# Patient Record
Sex: MALE | Race: WHITE | NOT HISPANIC OR LATINO | URBAN - METROPOLITAN AREA
[De-identification: names, ages, dates, MRNs, and addresses within clinical notes are randomized per-mention and may not be internally consistent; named-entity substitution may affect disease eponyms.]

---

## 2020-03-24 ENCOUNTER — HOSPITAL ENCOUNTER (EMERGENCY)
Facility: HOSPITAL | Age: 35
Discharge: HOME | End: 2020-03-24
Attending: EMERGENCY MEDICINE

## 2020-03-24 VITALS
BODY MASS INDEX: 22.78 KG/M2 | TEMPERATURE: 98 F | SYSTOLIC BLOOD PRESSURE: 142 MMHG | DIASTOLIC BLOOD PRESSURE: 99 MMHG | OXYGEN SATURATION: 97 % | HEIGHT: 73 IN | RESPIRATION RATE: 20 BRPM | HEART RATE: 100 BPM | WEIGHT: 171.88 LBS

## 2020-03-24 DIAGNOSIS — S02.40FA CLOSED FRACTURE OF LEFT ZYGOMATIC ARCH, INITIAL ENCOUNTER: ICD-10-CM

## 2020-03-24 DIAGNOSIS — S02.40DA CLOSED FRACTURE OF LEFT SIDE OF MAXILLA, INITIAL ENCOUNTER: Primary | ICD-10-CM

## 2020-03-24 PROCEDURE — 99284 EMERGENCY DEPT VISIT MOD MDM: CPT | Mod: 25

## 2020-03-24 RX ORDER — HYDROCODONE BITARTRATE AND ACETAMINOPHEN 10; 325 MG/1; MG/1
1 TABLET ORAL
Qty: 11 TABLET | Refills: 0 | Status: SHIPPED | OUTPATIENT
Start: 2020-03-24 | End: 2020-03-29
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
## 2024-06-30 ENCOUNTER — HOSPITAL ENCOUNTER (EMERGENCY)
Facility: HOSPITAL | Age: 39
Discharge: HOME | End: 2024-06-30
Attending: EMERGENCY MEDICINE
Payer: MEDICAID

## 2024-06-30 VITALS
WEIGHT: 152.81 LBS | SYSTOLIC BLOOD PRESSURE: 141 MMHG | TEMPERATURE: 98 F | BODY MASS INDEX: 20.25 KG/M2 | RESPIRATION RATE: 16 BRPM | HEIGHT: 73 IN | OXYGEN SATURATION: 100 % | DIASTOLIC BLOOD PRESSURE: 75 MMHG | HEART RATE: 77 BPM

## 2024-06-30 DIAGNOSIS — T14.90XA TRAUMA: ICD-10-CM

## 2024-06-30 DIAGNOSIS — S50.11XA CONTUSION OF RIGHT FOREARM, INITIAL ENCOUNTER: Primary | ICD-10-CM

## 2024-06-30 PROCEDURE — 99283 EMERGENCY DEPT VISIT LOW MDM: CPT | Mod: 25

## 2024-06-30 PROCEDURE — 25000003 PHARM REV CODE 250: Performed by: EMERGENCY MEDICINE

## 2024-06-30 RX ORDER — KETOROLAC TROMETHAMINE 10 MG/1
10 TABLET, FILM COATED ORAL
Status: COMPLETED | OUTPATIENT
Start: 2024-06-30 | End: 2024-06-30

## 2024-06-30 RX ADMIN — KETOROLAC TROMETHAMINE 10 MG: 10 TABLET, FILM COATED ORAL at 03:06

## 2024-06-30 NOTE — ED NOTES
Assisted patient in triage work-up. Placed patient on monitor at this time. RN and medic at bedside.
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
## 2024-07-06 ENCOUNTER — TELEPHONE (OUTPATIENT)
Dept: EMERGENCY MEDICINE | Facility: HOSPITAL | Age: 39
End: 2024-07-06
Payer: MEDICAID
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
## 2024-09-25 ENCOUNTER — ANESTHESIA (OUTPATIENT)
Dept: SURGERY | Facility: HOSPITAL | Age: 39
End: 2024-09-25
Payer: MEDICAID

## 2024-09-25 ENCOUNTER — ANESTHESIA EVENT (OUTPATIENT)
Dept: SURGERY | Facility: HOSPITAL | Age: 39
End: 2024-09-25
Payer: MEDICAID

## 2024-09-25 ENCOUNTER — HOSPITAL ENCOUNTER (INPATIENT)
Facility: HOSPITAL | Age: 39
LOS: 8 days | Discharge: NURSING, REHAB OR LONG-TERM CARE | DRG: 854 | End: 2024-10-03
Attending: EMERGENCY MEDICINE | Admitting: INTERNAL MEDICINE
Payer: MEDICAID

## 2024-09-25 DIAGNOSIS — A41.9 SEPSIS, DUE TO UNSPECIFIED ORGANISM, UNSPECIFIED WHETHER ACUTE ORGAN DYSFUNCTION PRESENT: Primary | ICD-10-CM

## 2024-09-25 DIAGNOSIS — S52.202G: ICD-10-CM

## 2024-09-25 DIAGNOSIS — R07.9 CHEST PAIN: ICD-10-CM

## 2024-09-25 DIAGNOSIS — M25.462 EFFUSION, LEFT KNEE: ICD-10-CM

## 2024-09-25 DIAGNOSIS — L02.414 ABSCESS OF LEFT ARM: ICD-10-CM

## 2024-09-25 DIAGNOSIS — M00.9 PYOGENIC ARTHRITIS OF LEFT KNEE JOINT, DUE TO UNSPECIFIED ORGANISM: ICD-10-CM

## 2024-09-25 DIAGNOSIS — F19.90 IV DRUG USER: ICD-10-CM

## 2024-09-25 DIAGNOSIS — M00.9 SEPTIC ARTHRITIS OF KNEE, LEFT: ICD-10-CM

## 2024-09-25 DIAGNOSIS — M25.462 EFFUSION OF LEFT KNEE JOINT: ICD-10-CM

## 2024-09-25 DIAGNOSIS — B19.20 HEPATITIS C TEST POSITIVE: ICD-10-CM

## 2024-09-25 DIAGNOSIS — R53.1 WEAKNESS: ICD-10-CM

## 2024-09-25 DIAGNOSIS — B95.61 STAPHYLOCOCCUS AUREUS BACTEREMIA: ICD-10-CM

## 2024-09-25 DIAGNOSIS — L03.114 CELLULITIS OF LEFT ARM: ICD-10-CM

## 2024-09-25 DIAGNOSIS — M00.862 ARTHRITIS OF LEFT KNEE DUE TO OTHER BACTERIA: ICD-10-CM

## 2024-09-25 DIAGNOSIS — R78.81 STAPHYLOCOCCUS AUREUS BACTEREMIA: ICD-10-CM

## 2024-09-25 DIAGNOSIS — M79.89 LEFT LEG SWELLING: ICD-10-CM

## 2024-09-25 DIAGNOSIS — Z72.0 TOBACCO ABUSE DISORDER: ICD-10-CM

## 2024-09-25 PROBLEM — E87.1 HYPONATREMIA: Status: ACTIVE | Noted: 2024-09-25

## 2024-09-25 PROBLEM — F19.10 IV DRUG ABUSE: Chronic | Status: ACTIVE | Noted: 2024-09-25

## 2024-09-25 PROBLEM — R76.8 HEPATITIS C ANTIBODY TEST POSITIVE: Status: ACTIVE | Noted: 2024-09-25

## 2024-09-25 LAB
ALBUMIN SERPL BCP-MCNC: 2.3 G/DL (ref 3.5–5.2)
ALP SERPL-CCNC: 101 U/L (ref 55–135)
ALT SERPL W/O P-5'-P-CCNC: 15 U/L (ref 10–44)
AMPHET+METHAMPHET UR QL: ABNORMAL
ANION GAP SERPL CALC-SCNC: 9 MMOL/L (ref 8–16)
APPEARANCE FLD: NORMAL
APTT PPP: 37.4 SEC (ref 21–32)
AST SERPL-CCNC: 22 U/L (ref 10–40)
BARBITURATES UR QL SCN>200 NG/ML: NEGATIVE
BASOPHILS # BLD AUTO: 0.06 K/UL (ref 0–0.2)
BASOPHILS NFR BLD: 0.4 % (ref 0–1.9)
BASOPHILS NFR FLD MANUAL: 1 %
BENZODIAZ UR QL SCN>200 NG/ML: NEGATIVE
BILIRUB SERPL-MCNC: 1.1 MG/DL (ref 0.1–1)
BILIRUB UR QL STRIP: NEGATIVE
BNP SERPL-MCNC: 17 PG/ML (ref 0–99)
BODY FLD TYPE: NORMAL
BODY FLD TYPE: NORMAL
BUN SERPL-MCNC: 9 MG/DL (ref 6–20)
BZE UR QL SCN: NEGATIVE
CALCIUM SERPL-MCNC: 8.5 MG/DL (ref 8.7–10.5)
CANNABINOIDS UR QL SCN: NEGATIVE
CHLORIDE SERPL-SCNC: 96 MMOL/L (ref 95–110)
CK SERPL-CCNC: 50 U/L (ref 20–200)
CLARITY UR: CLEAR
CO2 SERPL-SCNC: 24 MMOL/L (ref 23–29)
COLOR FLD: YELLOW
COLOR UR: YELLOW
CREAT SERPL-MCNC: 0.7 MG/DL (ref 0.5–1.4)
CREAT UR-MCNC: 77.6 MG/DL (ref 23–375)
CRP SERPL-MCNC: 236.9 MG/L (ref 0–8.2)
CRYSTALS FLD MICRO: NEGATIVE
DIFFERENTIAL METHOD BLD: ABNORMAL
EOSINOPHIL # BLD AUTO: 0 K/UL (ref 0–0.5)
EOSINOPHIL NFR BLD: 0.1 % (ref 0–8)
ERYTHROCYTE [DISTWIDTH] IN BLOOD BY AUTOMATED COUNT: 13.4 % (ref 11.5–14.5)
ERYTHROCYTE [SEDIMENTATION RATE] IN BLOOD BY WESTERGREN METHOD: 67 MM/HR (ref 0–10)
EST. GFR  (NO RACE VARIABLE): >60 ML/MIN/1.73 M^2
GLUCOSE SERPL-MCNC: 112 MG/DL (ref 70–110)
GLUCOSE UR QL STRIP: NEGATIVE
GRAM STN SPEC: NORMAL
HCT VFR BLD AUTO: 28.8 % (ref 40–54)
HCV AB SERPL QL IA: POSITIVE
HEP C VIRUS HOLD SPECIMEN: NORMAL
HGB BLD-MCNC: 9.6 G/DL (ref 14–18)
HGB UR QL STRIP: ABNORMAL
HIV 1+2 AB+HIV1 P24 AG SERPL QL IA: NEGATIVE
IMM GRANULOCYTES # BLD AUTO: 0.11 K/UL (ref 0–0.04)
IMM GRANULOCYTES NFR BLD AUTO: 0.7 % (ref 0–0.5)
INFLUENZA A, MOLECULAR: NEGATIVE
INFLUENZA B, MOLECULAR: NEGATIVE
INR PPP: 1 (ref 0.8–1.2)
KETONES UR QL STRIP: NEGATIVE
LACTATE SERPL-SCNC: 0.8 MMOL/L (ref 0.5–2.2)
LACTATE SERPL-SCNC: 0.9 MMOL/L (ref 0.5–2.2)
LEUKOCYTE ESTERASE UR QL STRIP: NEGATIVE
LYMPHOCYTES # BLD AUTO: 1.2 K/UL (ref 1–4.8)
LYMPHOCYTES NFR BLD: 7.6 % (ref 18–48)
LYMPHOCYTES NFR FLD MANUAL: 3 %
MAGNESIUM SERPL-MCNC: 2 MG/DL (ref 1.6–2.6)
MAGNESIUM SERPL-MCNC: 2 MG/DL (ref 1.6–2.6)
MCH RBC QN AUTO: 27.9 PG (ref 27–31)
MCHC RBC AUTO-ENTMCNC: 33.3 G/DL (ref 32–36)
MCV RBC AUTO: 84 FL (ref 82–98)
METHADONE UR QL SCN>300 NG/ML: NEGATIVE
MICROSCOPIC COMMENT: ABNORMAL
MONOCYTES # BLD AUTO: 1 K/UL (ref 0.3–1)
MONOCYTES NFR BLD: 6.5 % (ref 4–15)
MONOS+MACROS NFR FLD MANUAL: 7 %
NEUTROPHILS # BLD AUTO: 13.2 K/UL (ref 1.8–7.7)
NEUTROPHILS NFR BLD: 84.7 % (ref 38–73)
NEUTROPHILS NFR FLD MANUAL: 89 %
NITRITE UR QL STRIP: NEGATIVE
NRBC BLD-RTO: 0 /100 WBC
OHS QRS DURATION: 84 MS
OHS QTC CALCULATION: 531 MS
OPIATES UR QL SCN: NEGATIVE
PCP UR QL SCN>25 NG/ML: NEGATIVE
PH UR STRIP: 6 [PH] (ref 5–8)
PHOSPHATE SERPL-MCNC: 2 MG/DL (ref 2.7–4.5)
PLATELET # BLD AUTO: 298 K/UL (ref 150–450)
PMV BLD AUTO: 11.1 FL (ref 9.2–12.9)
POTASSIUM SERPL-SCNC: 3.4 MMOL/L (ref 3.5–5.1)
PROCALCITONIN SERPL IA-MCNC: 0.84 NG/ML
PROT SERPL-MCNC: 6.9 G/DL (ref 6–8.4)
PROT UR QL STRIP: ABNORMAL
PROTHROMBIN TIME: 11.6 SEC (ref 9–12.5)
RBC # BLD AUTO: 3.44 M/UL (ref 4.6–6.2)
RBC #/AREA URNS HPF: 5 /HPF (ref 0–4)
SARS-COV-2 RDRP RESP QL NAA+PROBE: NEGATIVE
SODIUM SERPL-SCNC: 129 MMOL/L (ref 136–145)
SP GR UR STRIP: 1.01 (ref 1–1.03)
SPECIMEN SOURCE: NORMAL
TOXICOLOGY INFORMATION: ABNORMAL
TROPONIN I SERPL DL<=0.01 NG/ML-MCNC: <0.006 NG/ML (ref 0–0.03)
URN SPEC COLLECT METH UR: ABNORMAL
UROBILINOGEN UR STRIP-ACNC: NEGATIVE EU/DL
WBC # BLD AUTO: 15.58 K/UL (ref 3.9–12.7)
WBC # FLD: NORMAL /CU MM
WBC #/AREA URNS HPF: 5 /HPF (ref 0–5)

## 2024-09-25 PROCEDURE — 96361 HYDRATE IV INFUSION ADD-ON: CPT

## 2024-09-25 PROCEDURE — 86803 HEPATITIS C AB TEST: CPT | Performed by: EMERGENCY MEDICINE

## 2024-09-25 PROCEDURE — 63600175 PHARM REV CODE 636 W HCPCS: Performed by: NURSE ANESTHETIST, CERTIFIED REGISTERED

## 2024-09-25 PROCEDURE — 87150 DNA/RNA AMPLIFIED PROBE: CPT | Performed by: EMERGENCY MEDICINE

## 2024-09-25 PROCEDURE — 80053 COMPREHEN METABOLIC PANEL: CPT | Performed by: EMERGENCY MEDICINE

## 2024-09-25 PROCEDURE — 36415 COLL VENOUS BLD VENIPUNCTURE: CPT | Performed by: EMERGENCY MEDICINE

## 2024-09-25 PROCEDURE — 87502 INFLUENZA DNA AMP PROBE: CPT | Performed by: EMERGENCY MEDICINE

## 2024-09-25 PROCEDURE — 63600175 PHARM REV CODE 636 W HCPCS: Performed by: EMERGENCY MEDICINE

## 2024-09-25 PROCEDURE — 37000009 HC ANESTHESIA EA ADD 15 MINS: Performed by: ORTHOPAEDIC SURGERY

## 2024-09-25 PROCEDURE — 83605 ASSAY OF LACTIC ACID: CPT | Performed by: EMERGENCY MEDICINE

## 2024-09-25 PROCEDURE — 63600175 PHARM REV CODE 636 W HCPCS

## 2024-09-25 PROCEDURE — 25000003 PHARM REV CODE 250: Performed by: NURSE ANESTHETIST, CERTIFIED REGISTERED

## 2024-09-25 PROCEDURE — 87206 SMEAR FLUORESCENT/ACID STAI: CPT | Mod: 91 | Performed by: ORTHOPAEDIC SURGERY

## 2024-09-25 PROCEDURE — 25000003 PHARM REV CODE 250: Performed by: EMERGENCY MEDICINE

## 2024-09-25 PROCEDURE — 93010 ELECTROCARDIOGRAM REPORT: CPT | Mod: ,,, | Performed by: INTERNAL MEDICINE

## 2024-09-25 PROCEDURE — 87206 SMEAR FLUORESCENT/ACID STAI: CPT | Mod: 91 | Performed by: EMERGENCY MEDICINE

## 2024-09-25 PROCEDURE — 83735 ASSAY OF MAGNESIUM: CPT | Performed by: EMERGENCY MEDICINE

## 2024-09-25 PROCEDURE — 89060 EXAM SYNOVIAL FLUID CRYSTALS: CPT | Performed by: EMERGENCY MEDICINE

## 2024-09-25 PROCEDURE — 84145 PROCALCITONIN (PCT): CPT | Performed by: EMERGENCY MEDICINE

## 2024-09-25 PROCEDURE — 99284 EMERGENCY DEPT VISIT MOD MDM: CPT | Mod: 57,25,, | Performed by: ORTHOPAEDIC SURGERY

## 2024-09-25 PROCEDURE — 88304 TISSUE EXAM BY PATHOLOGIST: CPT | Mod: 26,,, | Performed by: PATHOLOGY

## 2024-09-25 PROCEDURE — 87070 CULTURE OTHR SPECIMN AEROBIC: CPT | Mod: 59 | Performed by: ORTHOPAEDIC SURGERY

## 2024-09-25 PROCEDURE — 89051 BODY FLUID CELL COUNT: CPT | Performed by: EMERGENCY MEDICINE

## 2024-09-25 PROCEDURE — 85730 THROMBOPLASTIN TIME PARTIAL: CPT | Performed by: EMERGENCY MEDICINE

## 2024-09-25 PROCEDURE — 29877 ARTHRS KNEE SURG DBRDMT/SHVG: CPT | Mod: LT,,, | Performed by: ORTHOPAEDIC SURGERY

## 2024-09-25 PROCEDURE — 87077 CULTURE AEROBIC IDENTIFY: CPT | Mod: 59 | Performed by: ORTHOPAEDIC SURGERY

## 2024-09-25 PROCEDURE — 87116 MYCOBACTERIA CULTURE: CPT | Performed by: EMERGENCY MEDICINE

## 2024-09-25 PROCEDURE — 25000003 PHARM REV CODE 250: Performed by: INTERNAL MEDICINE

## 2024-09-25 PROCEDURE — 87186 SC STD MICRODIL/AGAR DIL: CPT | Mod: 59 | Performed by: ORTHOPAEDIC SURGERY

## 2024-09-25 PROCEDURE — 71000039 HC RECOVERY, EACH ADD'L HOUR: Performed by: ORTHOPAEDIC SURGERY

## 2024-09-25 PROCEDURE — 87205 SMEAR GRAM STAIN: CPT | Mod: 59 | Performed by: ORTHOPAEDIC SURGERY

## 2024-09-25 PROCEDURE — 0XBF0ZZ EXCISION OF LEFT LOWER ARM, OPEN APPROACH: ICD-10-PCS | Performed by: ORTHOPAEDIC SURGERY

## 2024-09-25 PROCEDURE — 88304 TISSUE EXAM BY PATHOLOGIST: CPT | Performed by: PATHOLOGY

## 2024-09-25 PROCEDURE — 88305 TISSUE EXAM BY PATHOLOGIST: CPT | Performed by: PATHOLOGY

## 2024-09-25 PROCEDURE — 36000711: Performed by: ORTHOPAEDIC SURGERY

## 2024-09-25 PROCEDURE — 63600175 PHARM REV CODE 636 W HCPCS: Performed by: STUDENT IN AN ORGANIZED HEALTH CARE EDUCATION/TRAINING PROGRAM

## 2024-09-25 PROCEDURE — 25000003 PHARM REV CODE 250: Performed by: ORTHOPAEDIC SURGERY

## 2024-09-25 PROCEDURE — 63600175 PHARM REV CODE 636 W HCPCS: Performed by: ORTHOPAEDIC SURGERY

## 2024-09-25 PROCEDURE — 86140 C-REACTIVE PROTEIN: CPT | Performed by: EMERGENCY MEDICINE

## 2024-09-25 PROCEDURE — 36000710: Performed by: ORTHOPAEDIC SURGERY

## 2024-09-25 PROCEDURE — 25500020 PHARM REV CODE 255: Performed by: EMERGENCY MEDICINE

## 2024-09-25 PROCEDURE — 87205 SMEAR GRAM STAIN: CPT | Performed by: EMERGENCY MEDICINE

## 2024-09-25 PROCEDURE — 87102 FUNGUS ISOLATION CULTURE: CPT | Mod: 59 | Performed by: ORTHOPAEDIC SURGERY

## 2024-09-25 PROCEDURE — 87102 FUNGUS ISOLATION CULTURE: CPT | Mod: 59 | Performed by: EMERGENCY MEDICINE

## 2024-09-25 PROCEDURE — 83880 ASSAY OF NATRIURETIC PEPTIDE: CPT | Performed by: EMERGENCY MEDICINE

## 2024-09-25 PROCEDURE — 25000003 PHARM REV CODE 250

## 2024-09-25 PROCEDURE — 37000008 HC ANESTHESIA 1ST 15 MINUTES: Performed by: ORTHOPAEDIC SURGERY

## 2024-09-25 PROCEDURE — 21400001 HC TELEMETRY ROOM

## 2024-09-25 PROCEDURE — 0SBD4ZZ EXCISION OF LEFT KNEE JOINT, PERCUTANEOUS ENDOSCOPIC APPROACH: ICD-10-PCS | Performed by: ORTHOPAEDIC SURGERY

## 2024-09-25 PROCEDURE — 63600175 PHARM REV CODE 636 W HCPCS: Performed by: INTERNAL MEDICINE

## 2024-09-25 PROCEDURE — 87389 HIV-1 AG W/HIV-1&-2 AB AG IA: CPT | Performed by: EMERGENCY MEDICINE

## 2024-09-25 PROCEDURE — 80307 DRUG TEST PRSMV CHEM ANLYZR: CPT | Performed by: EMERGENCY MEDICINE

## 2024-09-25 PROCEDURE — 85610 PROTHROMBIN TIME: CPT | Performed by: EMERGENCY MEDICINE

## 2024-09-25 PROCEDURE — 99285 EMERGENCY DEPT VISIT HI MDM: CPT | Mod: 25

## 2024-09-25 PROCEDURE — 87070 CULTURE OTHR SPECIMN AEROBIC: CPT | Mod: 59 | Performed by: EMERGENCY MEDICINE

## 2024-09-25 PROCEDURE — 85025 COMPLETE CBC W/AUTO DIFF WBC: CPT | Performed by: EMERGENCY MEDICINE

## 2024-09-25 PROCEDURE — 87116 MYCOBACTERIA CULTURE: CPT | Mod: 59 | Performed by: ORTHOPAEDIC SURGERY

## 2024-09-25 PROCEDURE — 71000033 HC RECOVERY, INTIAL HOUR: Performed by: ORTHOPAEDIC SURGERY

## 2024-09-25 PROCEDURE — A9585 GADOBUTROL INJECTION: HCPCS | Performed by: EMERGENCY MEDICINE

## 2024-09-25 PROCEDURE — 87186 SC STD MICRODIL/AGAR DIL: CPT | Mod: 59 | Performed by: EMERGENCY MEDICINE

## 2024-09-25 PROCEDURE — U0002 COVID-19 LAB TEST NON-CDC: HCPCS | Performed by: EMERGENCY MEDICINE

## 2024-09-25 PROCEDURE — 82550 ASSAY OF CK (CPK): CPT | Performed by: EMERGENCY MEDICINE

## 2024-09-25 PROCEDURE — 0S9D3ZX DRAINAGE OF LEFT KNEE JOINT, PERCUTANEOUS APPROACH, DIAGNOSTIC: ICD-10-PCS | Performed by: STUDENT IN AN ORGANIZED HEALTH CARE EDUCATION/TRAINING PROGRAM

## 2024-09-25 PROCEDURE — 84100 ASSAY OF PHOSPHORUS: CPT | Performed by: EMERGENCY MEDICINE

## 2024-09-25 PROCEDURE — 96375 TX/PRO/DX INJ NEW DRUG ADDON: CPT

## 2024-09-25 PROCEDURE — 87075 CULTR BACTERIA EXCEPT BLOOD: CPT | Mod: 59 | Performed by: ORTHOPAEDIC SURGERY

## 2024-09-25 PROCEDURE — 85651 RBC SED RATE NONAUTOMATED: CPT | Performed by: INTERNAL MEDICINE

## 2024-09-25 PROCEDURE — 93005 ELECTROCARDIOGRAM TRACING: CPT

## 2024-09-25 PROCEDURE — 87176 TISSUE HOMOGENIZATION CULTR: CPT | Performed by: ORTHOPAEDIC SURGERY

## 2024-09-25 PROCEDURE — 87522 HEPATITIS C REVRS TRNSCRPJ: CPT | Performed by: EMERGENCY MEDICINE

## 2024-09-25 PROCEDURE — 81000 URINALYSIS NONAUTO W/SCOPE: CPT | Mod: 59 | Performed by: EMERGENCY MEDICINE

## 2024-09-25 PROCEDURE — 87077 CULTURE AEROBIC IDENTIFY: CPT | Mod: 59 | Performed by: EMERGENCY MEDICINE

## 2024-09-25 PROCEDURE — 11044 DBRDMT BONE 1ST 20 SQ CM/<: CPT | Mod: 59,,, | Performed by: ORTHOPAEDIC SURGERY

## 2024-09-25 PROCEDURE — 96365 THER/PROPH/DIAG IV INF INIT: CPT

## 2024-09-25 PROCEDURE — 84484 ASSAY OF TROPONIN QUANT: CPT | Performed by: EMERGENCY MEDICINE

## 2024-09-25 PROCEDURE — 87015 SPECIMEN INFECT AGNT CONCNTJ: CPT | Performed by: ORTHOPAEDIC SURGERY

## 2024-09-25 PROCEDURE — 20610 DRAIN/INJ JOINT/BURSA W/O US: CPT | Mod: LT

## 2024-09-25 PROCEDURE — 27201423 OPTIME MED/SURG SUP & DEVICES STERILE SUPPLY: Performed by: ORTHOPAEDIC SURGERY

## 2024-09-25 PROCEDURE — 87040 BLOOD CULTURE FOR BACTERIA: CPT | Performed by: EMERGENCY MEDICINE

## 2024-09-25 RX ORDER — ONDANSETRON HYDROCHLORIDE 2 MG/ML
4 INJECTION, SOLUTION INTRAVENOUS EVERY 8 HOURS PRN
Status: DISCONTINUED | OUTPATIENT
Start: 2024-09-25 | End: 2024-10-03 | Stop reason: HOSPADM

## 2024-09-25 RX ORDER — PROPOFOL 10 MG/ML
VIAL (ML) INTRAVENOUS
Status: DISCONTINUED | OUTPATIENT
Start: 2024-09-25 | End: 2024-09-25

## 2024-09-25 RX ORDER — AMOXICILLIN 250 MG
1 CAPSULE ORAL 2 TIMES DAILY
Status: DISCONTINUED | OUTPATIENT
Start: 2024-09-25 | End: 2024-10-03 | Stop reason: HOSPADM

## 2024-09-25 RX ORDER — HYDROMORPHONE HYDROCHLORIDE 1 MG/ML
0.2 INJECTION, SOLUTION INTRAMUSCULAR; INTRAVENOUS; SUBCUTANEOUS EVERY 5 MIN PRN
Status: COMPLETED | OUTPATIENT
Start: 2024-09-25 | End: 2024-09-25

## 2024-09-25 RX ORDER — MORPHINE SULFATE 4 MG/ML
2 INJECTION, SOLUTION INTRAMUSCULAR; INTRAVENOUS EVERY 4 HOURS PRN
Status: DISCONTINUED | OUTPATIENT
Start: 2024-09-25 | End: 2024-09-28

## 2024-09-25 RX ORDER — IBUPROFEN 800 MG/1
800 TABLET ORAL
Status: COMPLETED | OUTPATIENT
Start: 2024-09-25 | End: 2024-09-25

## 2024-09-25 RX ORDER — IBUPROFEN 200 MG
16 TABLET ORAL
Status: DISCONTINUED | OUTPATIENT
Start: 2024-09-25 | End: 2024-10-03 | Stop reason: HOSPADM

## 2024-09-25 RX ORDER — SODIUM CHLORIDE 9 MG/ML
INJECTION, SOLUTION INTRAVENOUS
Status: DISCONTINUED | OUTPATIENT
Start: 2024-09-25 | End: 2024-10-03 | Stop reason: HOSPADM

## 2024-09-25 RX ORDER — ROCURONIUM BROMIDE 10 MG/ML
INJECTION, SOLUTION INTRAVENOUS
Status: DISCONTINUED | OUTPATIENT
Start: 2024-09-25 | End: 2024-09-25

## 2024-09-25 RX ORDER — OXYCODONE HYDROCHLORIDE 5 MG/1
5 TABLET ORAL EVERY 6 HOURS PRN
Status: DISCONTINUED | OUTPATIENT
Start: 2024-09-25 | End: 2024-10-01

## 2024-09-25 RX ORDER — EPINEPHRINE 1 MG/ML
INJECTION, SOLUTION, CONCENTRATE INTRAVENOUS
Status: DISCONTINUED | OUTPATIENT
Start: 2024-09-25 | End: 2024-09-25 | Stop reason: HOSPADM

## 2024-09-25 RX ORDER — SODIUM CHLORIDE 0.9 % (FLUSH) 0.9 %
10 SYRINGE (ML) INJECTION EVERY 12 HOURS PRN
Status: DISCONTINUED | OUTPATIENT
Start: 2024-09-25 | End: 2024-10-03 | Stop reason: HOSPADM

## 2024-09-25 RX ORDER — PROCHLORPERAZINE EDISYLATE 5 MG/ML
5 INJECTION INTRAMUSCULAR; INTRAVENOUS EVERY 6 HOURS PRN
Status: DISCONTINUED | OUTPATIENT
Start: 2024-09-25 | End: 2024-10-03 | Stop reason: HOSPADM

## 2024-09-25 RX ORDER — OXYCODONE AND ACETAMINOPHEN 5; 325 MG/1; MG/1
1 TABLET ORAL
Status: DISCONTINUED | OUTPATIENT
Start: 2024-09-25 | End: 2024-09-25 | Stop reason: HOSPADM

## 2024-09-25 RX ORDER — PHENYLEPHRINE HYDROCHLORIDE 10 MG/ML
INJECTION INTRAVENOUS
Status: DISCONTINUED | OUTPATIENT
Start: 2024-09-25 | End: 2024-09-25

## 2024-09-25 RX ORDER — TALC
6 POWDER (GRAM) TOPICAL NIGHTLY PRN
Status: DISCONTINUED | OUTPATIENT
Start: 2024-09-25 | End: 2024-10-03 | Stop reason: HOSPADM

## 2024-09-25 RX ORDER — SODIUM CHLORIDE, SODIUM LACTATE, POTASSIUM CHLORIDE, CALCIUM CHLORIDE 600; 310; 30; 20 MG/100ML; MG/100ML; MG/100ML; MG/100ML
INJECTION, SOLUTION INTRAVENOUS CONTINUOUS
Status: DISCONTINUED | OUTPATIENT
Start: 2024-09-25 | End: 2024-09-25

## 2024-09-25 RX ORDER — LIDOCAINE HYDROCHLORIDE 20 MG/ML
INJECTION, SOLUTION EPIDURAL; INFILTRATION; INTRACAUDAL; PERINEURAL
Status: DISCONTINUED | OUTPATIENT
Start: 2024-09-25 | End: 2024-09-25

## 2024-09-25 RX ORDER — SUCCINYLCHOLINE CHLORIDE 20 MG/ML
INJECTION INTRAMUSCULAR; INTRAVENOUS
Status: DISCONTINUED | OUTPATIENT
Start: 2024-09-25 | End: 2024-09-25

## 2024-09-25 RX ORDER — IBUPROFEN 200 MG
24 TABLET ORAL
Status: DISCONTINUED | OUTPATIENT
Start: 2024-09-25 | End: 2024-10-03 | Stop reason: HOSPADM

## 2024-09-25 RX ORDER — NALOXONE HCL 0.4 MG/ML
0.02 VIAL (ML) INJECTION
Status: DISCONTINUED | OUTPATIENT
Start: 2024-09-25 | End: 2024-10-03 | Stop reason: HOSPADM

## 2024-09-25 RX ORDER — MIDAZOLAM HYDROCHLORIDE 1 MG/ML
INJECTION INTRAMUSCULAR; INTRAVENOUS
Status: DISCONTINUED | OUTPATIENT
Start: 2024-09-25 | End: 2024-09-25

## 2024-09-25 RX ORDER — ONDANSETRON HYDROCHLORIDE 2 MG/ML
4 INJECTION, SOLUTION INTRAVENOUS DAILY PRN
Status: DISCONTINUED | OUTPATIENT
Start: 2024-09-25 | End: 2024-09-25 | Stop reason: HOSPADM

## 2024-09-25 RX ORDER — SODIUM CHLORIDE 9 MG/ML
INJECTION, SOLUTION INTRAVENOUS CONTINUOUS
Status: DISCONTINUED | OUTPATIENT
Start: 2024-09-25 | End: 2024-09-26

## 2024-09-25 RX ORDER — DIPHENHYDRAMINE HYDROCHLORIDE 50 MG/ML
12.5 INJECTION INTRAMUSCULAR; INTRAVENOUS
Status: DISCONTINUED | OUTPATIENT
Start: 2024-09-25 | End: 2024-09-25 | Stop reason: HOSPADM

## 2024-09-25 RX ORDER — ONDANSETRON HYDROCHLORIDE 2 MG/ML
INJECTION, SOLUTION INTRAVENOUS
Status: DISCONTINUED | OUTPATIENT
Start: 2024-09-25 | End: 2024-09-25

## 2024-09-25 RX ORDER — GADOBUTROL 604.72 MG/ML
10 INJECTION INTRAVENOUS
Status: COMPLETED | OUTPATIENT
Start: 2024-09-25 | End: 2024-09-25

## 2024-09-25 RX ORDER — ACETAMINOPHEN 500 MG
1000 TABLET ORAL
Status: COMPLETED | OUTPATIENT
Start: 2024-09-25 | End: 2024-09-25

## 2024-09-25 RX ORDER — KETOROLAC TROMETHAMINE 30 MG/ML
15 INJECTION, SOLUTION INTRAMUSCULAR; INTRAVENOUS EVERY 8 HOURS PRN
Status: DISCONTINUED | OUTPATIENT
Start: 2024-09-25 | End: 2024-09-25 | Stop reason: HOSPADM

## 2024-09-25 RX ORDER — ACETAMINOPHEN 325 MG/1
650 TABLET ORAL EVERY 4 HOURS PRN
Status: DISCONTINUED | OUTPATIENT
Start: 2024-09-25 | End: 2024-10-03 | Stop reason: HOSPADM

## 2024-09-25 RX ORDER — GLUCAGON 1 MG
1 KIT INJECTION
Status: DISCONTINUED | OUTPATIENT
Start: 2024-09-25 | End: 2024-10-03 | Stop reason: HOSPADM

## 2024-09-25 RX ORDER — GLUCAGON 1 MG
1 KIT INJECTION
Status: DISCONTINUED | OUTPATIENT
Start: 2024-09-25 | End: 2024-09-25 | Stop reason: HOSPADM

## 2024-09-25 RX ORDER — FENTANYL CITRATE 50 UG/ML
INJECTION, SOLUTION INTRAMUSCULAR; INTRAVENOUS
Status: DISCONTINUED | OUTPATIENT
Start: 2024-09-25 | End: 2024-09-25

## 2024-09-25 RX ORDER — POLYETHYLENE GLYCOL 3350 17 G/17G
17 POWDER, FOR SOLUTION ORAL DAILY PRN
Status: DISCONTINUED | OUTPATIENT
Start: 2024-09-25 | End: 2024-10-03 | Stop reason: HOSPADM

## 2024-09-25 RX ADMIN — IBUPROFEN 800 MG: 800 TABLET, FILM COATED ORAL at 11:09

## 2024-09-25 RX ADMIN — MIDAZOLAM HYDROCHLORIDE 1 MG: 1 INJECTION, SOLUTION INTRAMUSCULAR; INTRAVENOUS at 05:09

## 2024-09-25 RX ADMIN — LIDOCAINE HYDROCHLORIDE 80 MG: 20 INJECTION, SOLUTION EPIDURAL; INFILTRATION; INTRACAUDAL; PERINEURAL at 04:09

## 2024-09-25 RX ADMIN — CEFEPIME 2 G: 2 INJECTION, POWDER, FOR SOLUTION INTRAVENOUS at 09:09

## 2024-09-25 RX ADMIN — SUCCINYLCHOLINE CHLORIDE 100 MG: 20 INJECTION, SOLUTION INTRAMUSCULAR; INTRAVENOUS; PARENTERAL at 04:09

## 2024-09-25 RX ADMIN — HYDROMORPHONE HYDROCHLORIDE 0.2 MG: 1 INJECTION, SOLUTION INTRAMUSCULAR; INTRAVENOUS; SUBCUTANEOUS at 07:09

## 2024-09-25 RX ADMIN — SODIUM CHLORIDE 1000 ML: 9 INJECTION, SOLUTION INTRAVENOUS at 08:09

## 2024-09-25 RX ADMIN — PHENYLEPHRINE HYDROCHLORIDE 50 MCG: 10 INJECTION INTRAVENOUS at 05:09

## 2024-09-25 RX ADMIN — ROCURONIUM BROMIDE 20 MG: 10 INJECTION, SOLUTION INTRAVENOUS at 05:09

## 2024-09-25 RX ADMIN — HYDROMORPHONE HYDROCHLORIDE 0.2 MG: 1 INJECTION, SOLUTION INTRAMUSCULAR; INTRAVENOUS; SUBCUTANEOUS at 08:09

## 2024-09-25 RX ADMIN — CEFEPIME 2 G: 2 INJECTION, POWDER, FOR SOLUTION INTRAVENOUS at 10:09

## 2024-09-25 RX ADMIN — POTASSIUM BICARBONATE 25 MEQ: 978 TABLET, EFFERVESCENT ORAL at 10:09

## 2024-09-25 RX ADMIN — SODIUM CHLORIDE: 9 INJECTION, SOLUTION INTRAVENOUS at 09:09

## 2024-09-25 RX ADMIN — SODIUM CHLORIDE, POTASSIUM CHLORIDE, SODIUM LACTATE AND CALCIUM CHLORIDE: 600; 310; 30; 20 INJECTION, SOLUTION INTRAVENOUS at 04:09

## 2024-09-25 RX ADMIN — ROCURONIUM BROMIDE 20 MG: 10 INJECTION, SOLUTION INTRAVENOUS at 04:09

## 2024-09-25 RX ADMIN — VANCOMYCIN HYDROCHLORIDE 1750 MG: 10 INJECTION, POWDER, LYOPHILIZED, FOR SOLUTION INTRAVENOUS at 01:09

## 2024-09-25 RX ADMIN — PROPOFOL 175 MG: 10 INJECTION, EMULSION INTRAVENOUS at 04:09

## 2024-09-25 RX ADMIN — OXYCODONE HYDROCHLORIDE 5 MG: 5 TABLET ORAL at 08:09

## 2024-09-25 RX ADMIN — FENTANYL CITRATE 50 MCG: 50 INJECTION, SOLUTION INTRAMUSCULAR; INTRAVENOUS at 04:09

## 2024-09-25 RX ADMIN — ONDANSETRON 4 MG: 2 INJECTION INTRAMUSCULAR; INTRAVENOUS at 06:09

## 2024-09-25 RX ADMIN — KETOROLAC TROMETHAMINE 15 MG: 30 INJECTION, SOLUTION INTRAMUSCULAR at 07:09

## 2024-09-25 RX ADMIN — ACETAMINOPHEN 1000 MG: 500 TABLET ORAL at 09:09

## 2024-09-25 RX ADMIN — SODIUM CHLORIDE 1000 ML: 9 INJECTION, SOLUTION INTRAVENOUS at 09:09

## 2024-09-25 RX ADMIN — ROCURONIUM BROMIDE 45 MG: 10 INJECTION, SOLUTION INTRAVENOUS at 04:09

## 2024-09-25 RX ADMIN — MIDAZOLAM HYDROCHLORIDE 1 MG: 1 INJECTION, SOLUTION INTRAMUSCULAR; INTRAVENOUS at 06:09

## 2024-09-25 RX ADMIN — ROCURONIUM BROMIDE 4 MG: 10 INJECTION, SOLUTION INTRAVENOUS at 04:09

## 2024-09-25 RX ADMIN — SUGAMMADEX 200 MG: 100 INJECTION, SOLUTION INTRAVENOUS at 06:09

## 2024-09-25 RX ADMIN — SODIUM CHLORIDE, POTASSIUM CHLORIDE, SODIUM LACTATE AND CALCIUM CHLORIDE: 600; 310; 30; 20 INJECTION, SOLUTION INTRAVENOUS at 02:09

## 2024-09-25 RX ADMIN — MORPHINE SULFATE 2 MG: 4 INJECTION INTRAVENOUS at 07:09

## 2024-09-25 RX ADMIN — GADOBUTROL 6 ML: 604.72 INJECTION INTRAVENOUS at 12:09
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
## 2024-09-25 NOTE — ASSESSMENT & PLAN NOTE
Prior hx of IVDA, UDS on admission + for methamphetamines   Complicating management of above conditions

## 2024-09-25 NOTE — CONSULTS
CC    Left knee and forearm pain with 102 fever       HPI     Georges Daniels is a 38 y.o. male with history of IVDA I am asked to see regarding his  left knee and forearm pain and fevers.    He reports his left knee pain and swelling over the last day or two.  It became extremely swollen in the last twenty four hours and very painful to move.     He also states he had a fracture of the left ulna a few months ago.  The left forearm has been getting more and more painful.      He also reported to Dr. Turk that he had back pain with trying to sit up.  MRI lumbar spine was done which did not show any signs of infection.    He was just eating skittles.  He states he ate ten.  He states he has not had any other food since yesterday.       He is homeless.       There is no problem list on file for this patient.      PAST MEDICAL HISTORY  Past Medical History:   Diagnosis Date    Medical history non-contributory           PAST SURGICAL HISTORY  Past Surgical History:   Procedure Laterality Date    NO PAST SURGERIES            ALLERGIES  Review of patient's allergies indicates:  No Known Allergies       MEDICATIONS      No current outpatient medications       SOCIAL HISTORY      Social History     Occupational History    Not on file   Tobacco Use    Smoking status: Every Day     Current packs/day: 1.00     Types: Cigarettes    Smokeless tobacco: Never   Substance and Sexual Activity    Alcohol use: No    Drug use: Not Currently    Sexual activity: Not Currently       OCCUPATIONAL HISTORY   Patient's occupation: Data Unavailable.  Unemployed    FAMILY HISTORY   No family history on file.          PHYSICAL EXAMINATION  Vitals:    09/25/24 0738 09/25/24 0815 09/25/24 0835 09/25/24 0912   BP: 131/84  125/70    Pulse: 102  99    Resp: 18  20    Temp: 99 °F (37.2 °C)  (!) 102.7 °F (39.3 °C) (!) 102.7 °F (39.3 °C)   TempSrc: Oral  Oral    SpO2: 100%  97%    Weight:  66.6 kg (146 lb 13.2 oz) 66.6 kg (146 lb 13.2 oz)   "  Height:   6' 1" (1.854 m)     09/25/24 0945 09/25/24 1010 09/25/24 1020 09/25/24 1100   BP: 134/77 128/67 128/67 (!) 106/57   Pulse: 96 90 96 92   Resp: 18 20 18 20   Temp: (!) 102 °F (38.9 °C) (!) 102.1 °F (38.9 °C) (!) 102.1 °F (38.9 °C)    TempSrc: Oral Oral Oral    SpO2: 100% 98%  98%   Weight:       Height:        09/25/24 1330 09/25/24 1345 09/25/24 1400   BP:  111/68 106/63   Pulse:  65 77   Resp:  19 17   Temp: 97.5 °F (36.4 °C)     TempSrc: Oral     SpO2:  99% 99%   Weight:      Height:            GEN            NAD, ill appearing     PSYCH          A&Ox3, pleasant and cooperative    Left LE:    Skin intact at knee.  Foot/ankle with small pustules and abrasions.  Negative ecchymoses.  Positive swelling.  Thigh/calf soft.  Positive tenderness. AROM 0-80 with pain which he reports is better than before the knee was aspirated.  Sensation Intact.  EHL strength  5/5.  Ankle plantar/dorsiflexion strength 5/5.  Cap refill <2s.     Left UE:    Skin intact.  Negative ecchymoses.  Positive swelling.  Forearm soft.  Positive tenderness along ulnar aspect of forearm and over fracture callous. AROM of elbow full but with forearm pain.  Pronosupination full but painful.  Sensation Intact radial/ulnar/median nerves.  Radial/ulnar/median nerves intact to motor.  Cap refill <2s.       DIAGNOSTIC IMAGING   Left Knee  XR: 4 Views personally reviewed.  Large effusion.  No fracture/dislocation/degenerative changes.     LEFT Forearm  XR 2v personally reviewed.  Radius intact.  Hypertrophic nonunion of the distal 1/3 of ulna shaft noted.  Callous is very patchy.     LEFT Forearm MRI personally reviewed.  Hypertrophic nonunion again noted.  Forearm has two abscesses that communicate with the non-union.        LAB  LEFT knee aspirate   Gram stain Positive WBCs, no organisms.  Cell count [id number], 89% WBCS    Procal 0.84  .9  WBC 15.58     DISCUSSION  Diagnostic imaging, clinical findings, and patient's symptoms reviewed " and correlated.  Discussed the knee is infected and could cause sepsis if not treated.  Discussed he will need long term IV antibiotics and that I recommend washing out the infection to try and decrease his risks of sepsis.  Discussed he has an old fracture in the left arm that appears to be infected.   Discussed operative interventions, left knee arthroscopic irrigation and debridement and left forearm irrigation and debridement.     Case discussed with Dr. Turk of ER, Dr. Garza of [facility] medicine and Dr. Zamarripa of anesthesia.     Patient given an opportunity to ask questions.  When his questions were answered, he  agreed with the plan noted below.       DIAGNOSIS:   LEFT knee septic arthritis  Left ulna infected non-union  Sepsis  Hepatitis C  Homelessness  H/o IVDA       PLAN  1.     Risks and benefits discussed with patient including possible failure of procedure to relieve symptoms, need for further surgery, risks of infection, bleeding, damage to nerves/arteries/tendons/cartilage/ligaments, blood clots, pneumonia and risks of anesthesia.  Patient given an opportunity to ask questions.  When his  questions were answered, he decided to proceed with surgery.       Consent signed and placed on chart.    Will proceed with surgery today       2. Ortho trauma to follow

## 2024-09-25 NOTE — ASSESSMENT & PLAN NOTE
- MRI concerning for abscess, osteomyelitis and cellulitis of L arm   - orthopedics consulted- discussed with Dr. Lamar- pt planned for washout today, will likely need repair of fracture once infection more controlled   - continue IV Antibiotics as above

## 2024-09-25 NOTE — CONSULTS
Pharmacokinetic Initial Assessment: IV Vancomycin    Assessment/Plan:  - Initiate intravenous vancomycin with loading dose of 1750 mg once with subsequent doses when random concentrations are less than 20 mcg/mL  - Desired empiric serum trough concentration is 15 to 20 mcg/mL  - Draw vancomycin random level on 9/26 at 0200 (~12 hour post dose level).      Pharmacy will continue to follow and monitor vancomycin.    Please contact pharmacy at extension [phone] for questions regarding this assessment.    Thank you for the consult,   Nahomy SageD      Patient brief summary:  Georges Daniels is a 38 y.o. male initiated on antimicrobial therapy with IV Vancomycin for treatment of suspected bone/joint infection.    Drug Allergies:   Review of patient's allergies indicates:  No Known Allergies    Actual Body Weight: 66.6 kg    Renal Function:   Estimated Creatinine Clearance: 132.1 mL/min (based on SCr of 0.7 mg/dL). Unknown baseline Scr.     Dialysis Method (if applicable): N/A    CBC (last 72 hours):  Recent Labs   Lab Result Units 09/25/24  0858   WBC K/uL 15.58*   Hemoglobin g/dL 9.6*   Hematocrit % 28.8*   Platelets K/uL 298   Gran % % 84.7*   Lymph % % 7.6*   Mono % % 6.5   Eosinophil % % 0.1   Basophil % % 0.4   Differential Method  Automated       Metabolic Panel (last 72 hours):  Recent Labs   Lab Result Units 09/25/24  0858 09/25/24  0913   Sodium mmol/L 129*  --    Potassium mmol/L 3.4*  --    Chloride mmol/L 96  --    CO2 mmol/L 24  --    Glucose mg/dL 112*  --    Glucose, UA   --  Negative   BUN mg/dL 9  --    Creatinine mg/dL 0.7  --    Creatinine, Urine mg/dL  --  77.6   Albumin g/dL 2.3*  --    Total Bilirubin mg/dL 1.1*  --    Alkaline Phosphatase U/L 101  --    AST U/L 22  --    ALT U/L 15  --    Magnesium mg/dL 2.0  2.0  --    Phosphorus mg/dL 2.0*  --      Microbiologic Results:  Microbiology Results (last 7 days)       Procedure Component Value Units Date/Time    Gram stain [[id number]]  Collected: 09/25/24 1009    Order Status: Completed Specimen: Joint Fluid from Knee, Left Updated: 09/25/24 1133     Gram Stain Result Many WBC's      No epithelial cells      No organisms seen    AFB Culture & Smear [[id number]] Collected: 09/25/24 1009    Order Status: Sent Specimen: Joint Fluid from Knee, Left Updated: 09/25/24 1020    Fungus culture [[id number]] Collected: 09/25/24 1009    Order Status: Sent Specimen: Joint Fluid from Knee, Left Updated: 09/25/24 1018    Culture, Body Fluid - Bactec [[id number]] Collected: 09/25/24 1009    Order Status: Sent Specimen: Joint Fluid from Knee, Left Updated: 09/25/24 1018    Influenza A & B by Molecular [[id number]] Collected: 09/25/24 0938    Order Status: Completed Specimen: Nasopharyngeal Swab Updated: 09/25/24 1012     Influenza A, Molecular Negative     Influenza B, Molecular Negative     Flu A & B Source Nasal swab    Blood culture x two cultures. Draw prior to antibiotics. [[id number]] Collected: 09/25/24 0903    Order Status: Sent Specimen: Blood from Peripheral, Antecubital, Left     Blood culture x two cultures. Draw prior to antibiotics. [[id number]] Collected: 09/25/24 0901    Order Status: Sent Specimen: Blood from Peripheral, Forearm, Right           Thank you for allowing us to participate in this patient's care.     Dotty Mata PharmD 09/25/2024 2:29 PM

## 2024-09-25 NOTE — PROGRESS NOTES
Pharmacist Renal Dose Adjustment Note    Georges Daniels is a 38 y.o. male being treated with the medication cefepime    Patient Data:    Vital Signs (Most Recent):  Temp: (!) 102.1 °F (38.9 °C) (09/25/24 1020)  Pulse: 96 (09/25/24 1020)  Resp: 18 (09/25/24 1020)  BP: 128/67 (09/25/24 1020)  SpO2: 98 % (09/25/24 1010) Vital Signs (72h Range):  Temp:  [99 °F (37.2 °C)-102.7 °F (39.3 °C)]   Pulse:  []   Resp:  [18-20]   BP: (125-134)/(67-84)   SpO2:  [97 %-100 %]      Recent Labs   Lab 09/25/24  0858   CREATININE 0.7     Serum creatinine: 0.7 mg/dL 09/25/24 0858  Estimated creatinine clearance: 132.1 mL/min    Medication:cefepime dose: 2g frequency q12 will be changed to medication:Cefepime dose:2g frequency:q8    Pharmacist's Name: Susana Reynolds  Pharmacist's Extension: [phone]

## 2024-09-25 NOTE — ED PROVIDER NOTES
SCRIBE #1 NOTE: I, Ila Bryant, am scribing for, and in the presence of, Lise Turk MD. I have scribed the entire note.       History     Chief Complaint   Patient presents with    Leg Swelling     Pt. Reports pain all over with lower extremity swelling and pain to the left arm from an old Fx. And left knee pain.      Review of patient's allergies indicates:  No Known Allergies      History of Present Illness     HPI    9/25/2024, 8:18 AM  History obtained from the patient      History of Present Illness: Georges Daniels is a 38 y.o. male patient with no PMHx documented who presents to the Emergency Department for evaluation of left knee swelling which onset suddenly PTA. Pt reports he is unable to lift himself up when laying down due to lower back pain. Pt's left knee and leg became swollen when he woke up and is unable to walk or put pressure on the left leg. Denies any trauma to knee or leg, no back trauma.  Pt reports a prior fracture to the left forearm. Pt does complain of swelling and pain to the left forearm. . Pt denies any alcohol use. Pt smokes cigarettes. Pt has a history of IV drug use, but reports he has stopped, states he is homeless. Symptoms are constant and moderate in severity. No mitigating or exacerbating factors reported. Associated sxs include worsening general myalgias and weakness. Patient denies cough, chest pain, dyspnea, no bowel or bladder incontinence, no saddle anesthesia, no urinary retention all other sxs at this time. No prior tx mentioned. No further complaints or concerns at this time.       Arrival mode: Ambulance Services    PCP: No, Primary Doctor        Past Medical History:  Past Medical History:   Diagnosis Date    Medical history non-contributory        Past Surgical History:  Past Surgical History:   Procedure Laterality Date    NO PAST SURGERIES           Family History:  No family history on file.    Social History:  Social History     Tobacco Use     Smoking status: Every Day     Current packs/day: 1.00     Types: Cigarettes    Smokeless tobacco: Never   Substance and Sexual Activity    Alcohol use: No    Drug use: Not Currently    Sexual activity: Not Currently        Review of Systems     Review of Systems   Constitutional:  Positive for activity change.   Cardiovascular:  Positive for leg swelling (left leg).   Musculoskeletal:  Positive for back pain, gait problem, joint swelling (left knee) and myalgias (left forearm, generalized).   Neurological:  Positive for weakness.   All other systems reviewed and are negative.       Physical Exam     Initial Vitals [09/25/24 0738]   BP Pulse Resp Temp SpO2   131/84 102 18 99 °F (37.2 °C) 100 %      MAP       --          Physical Exam  Nursing Notes and Vital Signs Reviewed.  Constitutional: Patient is in no acute distress. Disheveled and unkept. Appears older than stated age.  Pale appearing. Chronically ill appearing.   Head: Atraumatic. Normocephalic.  Eyes: PERRL. EOM intact. Conjunctivae are not pale. No scleral icterus.  ENT: Mucous membranes are dry. Oropharynx is clear and symmetric.    Neck: Supple. Full ROM. No lymphadenopathy.  Cardiovascular: Regular rate. Regular rhythm. No murmurs, rubs, or gallops. Distal and pedal pulses are 2+ and symmetric.  Pulmonary/Chest: No respiratory distress. Clear to auscultation bilaterally. No wheezing or rales.  Abdominal: Soft and non-distended.  There is no tenderness.  No rebound, guarding, or rigidity.  Genitourinary: No CVA tenderness  Musculoskeletal: Diffuse soft tissue swelling to left knee. There is pain with palpation, ROM is limited, no overlying skin changes. No crepitance to left knee. Soft tissue swelling and tenderness to dorsal aspect of left forearm.  There is erythema, warmth and induration with central pustule, no active drainage, no fluctuance. Motor ability of left elbow is intact. No midline cervical or thoracic tenderness, no lumbar tenderness, no  "CVA Tenderness or paraspinal tenderness. Trace soft tissue swelling to both feet.   Skin: Warm and dry. Excoriations to both arms and legs.   Neurological:  Alert, awake, and appropriate.  Normal speech.  No acute focal neurological deficits are appreciated. Sensation in left elbow is intact.  Psychiatric: Normal affect. Good eye contact. Appropriate in content.         ED Course   Arthrocentesis    Date/Time: 9/25/2024 10:16 AM  Location procedure was performed: [facility] EMERGENCY DEPARTMENT    Performed by: Lise Turk MD  Authorized by: Lise Turk MD  Consent Done: Yes  Consent: Verbal consent obtained.  Risks and benefits: risks, benefits and alternatives were discussed  Consent given by: patient  Patient understanding: patient states understanding of the procedure being performed  Patient consent: the patient's understanding of the procedure matches consent given  Procedure consent: procedure consent matches procedure scheduled  Relevant documents: relevant documents present and verified  Test results: test results available and properly labeled  Site marked: the operative site was marked  Imaging studies: imaging studies available  Patient identity confirmed: name and verbally with patient  Time out: Immediately prior to procedure a "time out" was called to verify the correct patient, procedure, equipment, support staff and site/side marked as required.  Indications: possible septic joint, joint swelling and pain   Body area: knee  Joint: left knee  Local anesthesia used: no    Anesthesia:  Local anesthesia used: no    Patient sedated: no  Preparation: Patient was prepped and draped in the usual sterile fashion.  Needle size: 18 G  Approach: lateral  Aspirate amount: 20 mL  Aspirate: yellow and cloudy  Patient tolerance: Patient tolerated the procedure well with no immediate complications  Complications: No      Critical Care    Date/Time: 9/25/2024 3:10 PM    Performed by: Lise Turk, " "MD  Authorized by: Lise Turk MD  Direct patient critical care time: 45 minutes  Additional history critical care time: 8 minutes  Ordering / reviewing critical care time: 8 minutes  Documentation critical care time: 8 minutes  Consulting other physicians critical care time: 8 minutes  Total critical care time (exclusive of procedural time) : 77 minutes  Critical care was necessary to treat or prevent imminent or life-threatening deterioration of the following conditions: sepsis, dehydration and metabolic crisis.  Critical care was time spent personally by me on the following activities: blood draw for specimens, development of treatment plan with patient or surrogate, discussions with consultants, discussions with primary provider, interpretation of cardiac output measurements, evaluation of patient's response to treatment, examination of patient, obtaining history from patient or surrogate, ordering and review of laboratory studies, ordering and performing treatments and interventions, ordering and review of radiographic studies, pulse oximetry, re-evaluation of patient's condition and review of old charts.        ED Vital Signs:  Vitals:    09/25/24 0738 09/25/24 0815 09/25/24 0835 09/25/24 0912   BP: 131/84  125/70    Pulse: 102  99    Resp: 18  20    Temp: 99 °F (37.2 °C)  (!) 102.7 °F (39.3 °C) (!) 102.7 °F (39.3 °C)   TempSrc: Oral  Oral    SpO2: 100%  97%    Weight:  66.6 kg (146 lb 13.2 oz) 66.6 kg (146 lb 13.2 oz)    Height:   6' 1" (1.854 m)     09/25/24 0945 09/25/24 1010 09/25/24 1020 09/25/24 1100   BP: 134/77 128/67 128/67 (!) 106/57   Pulse: 96 90 96 92   Resp: 18 20 18 20   Temp: (!) 102 °F (38.9 °C) (!) 102.1 °F (38.9 °C) (!) 102.1 °F (38.9 °C)    TempSrc: Oral Oral Oral    SpO2: 100% 98%  98%   Weight:       Height:        09/25/24 1330 09/25/24 1345 09/25/24 1400   BP:  111/68 106/63   Pulse:  65 77   Resp:  19 17   Temp: 97.5 °F (36.4 °C)     TempSrc: Oral     SpO2:  99% 99%   Weight:    "   Height:          Abnormal Lab Results:  Labs Reviewed   HEPATITIS C ANTIBODY - Abnormal       Result Value    Hepatitis C Ab Positive (*)     Narrative:     Release to patient->Immediate   CBC W/ AUTO DIFFERENTIAL - Abnormal    WBC 15.58 (*)     RBC 3.44 (*)     Hemoglobin 9.6 (*)     Hematocrit 28.8 (*)     MCV 84      MCH 27.9      MCHC 33.3      RDW 13.4      Platelets 298      MPV 11.1      Immature Granulocytes 0.7 (*)     Gran # (ANC) 13.2 (*)     Immature Grans (Abs) 0.11 (*)     Lymph # 1.2      Mono # 1.0      Eos # 0.0      Baso # 0.06      nRBC 0      Gran % 84.7 (*)     Lymph % 7.6 (*)     Mono % 6.5      Eosinophil % 0.1      Basophil % 0.4      Differential Method Automated     COMPREHENSIVE METABOLIC PANEL - Abnormal    Sodium 129 (*)     Potassium 3.4 (*)     Chloride 96      CO2 24      Glucose 112 (*)     BUN 9      Creatinine 0.7      Calcium 8.5 (*)     Total Protein 6.9      Albumin 2.3 (*)     Total Bilirubin 1.1 (*)     Alkaline Phosphatase 101      AST 22      ALT 15      eGFR >60      Anion Gap 9     URINALYSIS, REFLEX TO URINE CULTURE - Abnormal    Specimen UA Urine, Clean Catch      Color, UA Yellow      Appearance, UA Clear      pH, UA 6.0      Specific Gravity, UA 1.010      Protein, UA Trace (*)     Glucose, UA Negative      Ketones, UA Negative      Bilirubin (UA) Negative      Occult Blood UA 1+ (*)     Nitrite, UA Negative      Urobilinogen, UA Negative      Leukocytes, UA Negative      Narrative:     Specimen Source->Urine   PHOSPHORUS - Abnormal    Phosphorus 2.0 (*)    APTT - Abnormal    aPTT 37.4 (*)    PROCALCITONIN - Abnormal    Procalcitonin 0.84 (*)    DRUG SCREEN PANEL, URINE EMERGENCY - Abnormal    Benzodiazepines Negative      Methadone metabolites Negative      Cocaine (Metab.) Negative      Opiate Scrn, Ur Negative      Barbiturate Screen, Ur Negative      Amphetamine Screen, Ur Presumptive Positive (*)     THC Negative      Phencyclidine Negative      Creatinine,  Urine 77.6      Toxicology Information SEE COMMENT      Narrative:     Specimen Source->Urine   C-REACTIVE PROTEIN - Abnormal    .9 (*)    URINALYSIS MICROSCOPIC - Abnormal    RBC, UA 5 (*)     WBC, UA 5      Microscopic Comment SEE COMMENT      Narrative:     Specimen Source->Urine   GRAM STAIN    Gram Stain Result Many WBC's      Gram Stain Result No epithelial cells      Gram Stain Result No organisms seen     INFLUENZA A & B BY MOLECULAR    Influenza A, Molecular Negative      Influenza B, Molecular Negative      Flu A & B Source Nasal swab     CULTURE, BLOOD   CULTURE, BLOOD   CULTURE, BODY FLUID - BACTEC   CULTURE, FUNGUS   AFB CULTURE & SMEAR   HIV 1 / 2 ANTIBODY    HIV 1/2 Ag/Ab Negative      Narrative:     Release to patient->Immediate   HEP C VIRUS HOLD SPECIMEN    HEP C Virus Hold Specimen Hold for HCV sendout      Narrative:     Release to patient->Immediate   LACTIC ACID, PLASMA    Lactate (Lactic Acid) 0.9     MAGNESIUM    Magnesium 2.0     PROTIME-INR    Prothrombin Time 11.6      INR 1.0     TROPONIN I    Troponin I <0.006     CK    CPK 50     MAGNESIUM    Magnesium 2.0     SARS-COV-2 RNA AMPLIFICATION, QUAL    SARS-CoV-2 RNA, Amplification, Qual Negative     WBC & DIFF, BODY FLUID    Body Fluid Type Synovial Fluid      Fluid Appearance Cloudy      Fluid Color Yellow      WBC, Body Fluid 96794      Segs, Fluid 89      Lymphs, Fluid 3      Monocytes/Macrophages, Fluid 7      Baso, Fluid 1     B-TYPE NATRIURETIC PEPTIDE   LACTIC ACID, PLASMA    Lactate (Lactic Acid) 0.8     B-TYPE NATRIURETIC PEPTIDE    BNP 17     BODY FLUID CRYSTAL   HEPATITIS C RNA, QUANTITATIVE, PCR   SEDIMENTATION RATE        All Lab Results:  Results for orders placed or performed during the hospital encounter of 09/25/24   Gram stain    Specimen: Knee, Left; Joint Fluid   Result Value Ref Range    Gram Stain Result Many WBC's     Gram Stain Result No epithelial cells     Gram Stain Result No organisms seen    Influenza A &  B by Molecular    Specimen: Nasopharyngeal Swab   Result Value Ref Range    Influenza A, Molecular Negative Negative    Influenza B, Molecular Negative Negative    Flu A & B Source Nasal swab    HIV 1/2 Ag/Ab (4th Gen)   Result Value Ref Range    HIV 1/2 Ag/Ab Negative Negative   Hepatitis C Antibody   Result Value Ref Range    Hepatitis C Ab Positive (A) Negative   HCV Virus Hold Specimen   Result Value Ref Range    HEP C Virus Hold Specimen Hold for HCV sendout    CBC auto differential   Result Value Ref Range    WBC 15.58 (H) 3.90 - 12.70 K/uL    RBC 3.44 (L) 4.60 - 6.20 M/uL    Hemoglobin 9.6 (L) 14.0 - 18.0 g/dL    Hematocrit 28.8 (L) 40.0 - 54.0 %    MCV 84 82 - 98 fL    MCH 27.9 27.0 - 31.0 pg    MCHC 33.3 32.0 - 36.0 g/dL    RDW 13.4 11.5 - 14.5 %    Platelets 298 150 - 450 K/uL    MPV 11.1 9.2 - 12.9 fL    Immature Granulocytes 0.7 (H) 0.0 - 0.5 %    Gran # (ANC) 13.2 (H) 1.8 - 7.7 K/uL    Immature Grans (Abs) 0.11 (H) 0.00 - 0.04 K/uL    Lymph # 1.2 1.0 - 4.8 K/uL    Mono # 1.0 0.3 - 1.0 K/uL    Eos # 0.0 0.0 - 0.5 K/uL    Baso # 0.06 0.00 - 0.20 K/uL    nRBC 0 0 /100 WBC    Gran % 84.7 (H) 38.0 - 73.0 %    Lymph % 7.6 (L) 18.0 - 48.0 %    Mono % 6.5 4.0 - 15.0 %    Eosinophil % 0.1 0.0 - 8.0 %    Basophil % 0.4 0.0 - 1.9 %    Differential Method Automated    Comprehensive metabolic panel   Result Value Ref Range    Sodium 129 (L) 136 - 145 mmol/L    Potassium 3.4 (L) 3.5 - 5.1 mmol/L    Chloride 96 95 - 110 mmol/L    CO2 24 23 - 29 mmol/L    Glucose 112 (H) 70 - 110 mg/dL    BUN 9 6 - 20 mg/dL    Creatinine 0.7 0.5 - 1.4 mg/dL    Calcium 8.5 (L) 8.7 - 10.5 mg/dL    Total Protein 6.9 6.0 - 8.4 g/dL    Albumin 2.3 (L) 3.5 - 5.2 g/dL    Total Bilirubin 1.1 (H) 0.1 - 1.0 mg/dL    Alkaline Phosphatase 101 55 - 135 U/L    AST 22 10 - 40 U/L    ALT 15 10 - 44 U/L    eGFR >60 >60 mL/min/1.73 m^2    Anion Gap 9 8 - 16 mmol/L   Lactic acid, plasma #1   Result Value Ref Range    Lactate (Lactic Acid) 0.9 0.5 - 2.2  mmol/L   Urinalysis, Reflex to Urine Culture Urine, Clean Catch    Specimen: Urine   Result Value Ref Range    Specimen UA Urine, Clean Catch     Color, UA Yellow Yellow, Straw, Nichelle    Appearance, UA Clear Clear    pH, UA 6.0 5.0 - 8.0    Specific Gravity, UA 1.010 1.005 - 1.030    Protein, UA Trace (A) Negative    Glucose, UA Negative Negative    Ketones, UA Negative Negative    Bilirubin (UA) Negative Negative    Occult Blood UA 1+ (A) Negative    Nitrite, UA Negative Negative    Urobilinogen, UA Negative <2.0 EU/dL    Leukocytes, UA Negative Negative   Magnesium   Result Value Ref Range    Magnesium 2.0 1.6 - 2.6 mg/dL   Phosphorus   Result Value Ref Range    Phosphorus 2.0 (L) 2.7 - 4.5 mg/dL   Protime-INR   Result Value Ref Range    Prothrombin Time 11.6 9.0 - 12.5 sec    INR 1.0 0.8 - 1.2   APTT   Result Value Ref Range    aPTT 37.4 (H) 21.0 - 32.0 sec   Troponin I   Result Value Ref Range    Troponin I <0.006 0.000 - 0.026 ng/mL   Procalcitonin   Result Value Ref Range    Procalcitonin 0.84 (H) <0.25 ng/mL   CPK   Result Value Ref Range    CPK 50 20 - 200 U/L   Drug screen panel, emergency   Result Value Ref Range    Benzodiazepines Negative Negative    Methadone metabolites Negative Negative    Cocaine (Metab.) Negative Negative    Opiate Scrn, Ur Negative Negative    Barbiturate Screen, Ur Negative Negative    Amphetamine Screen, Ur Presumptive Positive (A) Negative    THC Negative Negative    Phencyclidine Negative Negative    Creatinine, Urine 77.6 23.0 - 375.0 mg/dL    Toxicology Information SEE COMMENT    Magnesium   Result Value Ref Range    Magnesium 2.0 1.6 - 2.6 mg/dL   C-reactive protein   Result Value Ref Range    .9 (H) 0.0 - 8.2 mg/L   COVID-19 Rapid Screening   Result Value Ref Range    SARS-CoV-2 RNA, Amplification, Qual Negative Negative   WBC & Diff,Body Fluid Synovial Fluid   Result Value Ref Range    Body Fluid Type Synovial Fluid     Fluid Appearance Cloudy     Fluid Color Yellow      WBC, Body Fluid 44112 /cu mm    Segs, Fluid 89 %    Lymphs, Fluid 3 %    Monocytes/Macrophages, Fluid 7 %    Baso, Fluid 1 %   Urinalysis Microscopic   Result Value Ref Range    RBC, UA 5 (H) 0 - 4 /hpf    WBC, UA 5 0 - 5 /hpf    Microscopic Comment SEE COMMENT    Lactic acid, plasma #2   Result Value Ref Range    Lactate (Lactic Acid) 0.8 0.5 - 2.2 mmol/L   BNP   Result Value Ref Range    BNP 17 0 - 99 pg/mL   EKG 12-lead   Result Value Ref Range    QRS Duration 84 ms    [facility] QTC Calculation 531 ms         Imaging Results:  Imaging Results              MRI Lumbar Spine W WO Cont (Final result)  Result time 09/25/24 13:24:24      Final result by Darrian Zhang MD (09/25/24 13:24:24)                   Impression:      No acute abnormality or abnormal enhancement.    Multilevel degenerative changes as detailed above including faint Modic type 1 edema at the L5-S1 endplates.    No significant spinal canal stenosis.  Varying degrees of mild-to-moderate neural foraminal stenosis at the L4-L5 and L5-S1 levels.      Electronically signed by: Darrian Zhang  Date:    09/25/2024  Time:    13:24               Narrative:    EXAMINATION:  MRI LUMBAR SPINE W WO CONTRAST    CLINICAL HISTORY:  Low back pain, infection suspected;infec;    TECHNIQUE:  Multiplanar, multisequence MR images were acquired from the thoracolumbar junction to the sacrum before and after intravenous administration of 6 mL Gadavist    COMPARISON:  None.    FINDINGS:  There are 5 non-rib-bearing lumbar vertebrae.  Alignment is unremarkable with no significant listhesis.  No acute fracture or compression deformity.  No abnormal osseous enhancement or aggressive focal signal abnormality.  Faint Modic type 1 edema noted at the L5-S1 endplates.    Conus medullaris terminates at the L2 level.  Conus medullaris is normal in size and signal.    T12-L1: No significant disc pathology.  No significant spinal canal or neural foraminal stenosis.    L1-L2:  No significant disc pathology.  No significant spinal canal or neural foraminal stenosis.    L2-L3: No significant disc pathology.  No significant spinal canal or neural foraminal stenosis.    L3-L4: Mild disc desiccation and trace disc bulge.  Mild bilateral facet arthropathy.  No significant spinal canal or neural foraminal stenosis.    L4-L5: Mild disc desiccation and small circumferential disc bulge.  Mild bilateral facet arthropathy.  No significant spinal canal stenosis.  Mild left greater than right neural foraminal stenosis.    L5-S1: Disc desiccation and small circumferential disc bulge.  No significant spinal canal stenosis.  Moderate right and mild left neural foraminal stenosis.    Paraspinal soft tissues are unremarkable.  Visualized intra-abdominal and pelvic contents are also unremarkable.                                       MRI Forearm W WO Contrast Left (Final result)  Result time 09/25/24 13:43:57   Procedure changed from MRI Forearm With Contrast Left     Final result by Masood Ching MD (09/25/24 13:43:57)                   Impression:     Markedly abnormal MR left forearm.  See discussion above.    Finalized on: 9/25/2024 1:43 PM By:  Masood Ching MD  [facility]# [id number]      2024-09-25 13:46:00.030    [facility]               Narrative:    EXAM:  MRI FOREARM W WO CONTRAST LEFT    CLINICAL HISTORY:   Osteomyelitis, unspecified    TECHNIQUE:  MR left forearm with and without contrast performed multiplanar multisequence imaging.  6 mL Gadavist.    COMPARISON STUDY:   Left forearm x-ray 09/24/2025, 06/30/2024.    FINDINGS:  There is marked motion artifact on multiple sequences limiting detail.    Again, there is a ulnar shaft chronic fracture sequela with pronounced mature callus formation, new compared with June 2024, incompletely healed with persistent fracture defect, filled in with enhancing granulation/fluid signal and marked circumferential soft tissue swelling encircling the fracture site and  extending throughout the mid to distal forearm.    Additionally, there are (2) loculated nonenhancing complex fluid collections medial and lateral margins of the exuberant callus, 3.5 x 1.5 x 1 cm and 2.1 x 0.8 x 0.7 cm, with the larger collection demonstrating a sinus track extending directly to the exuberant callus formation.    There is enhancing ulnar shaft bone marrow edema with decreased T1 and increased STIR signal.    There is diffuse forearm and hand the prominent circumferential subcutaneous edema.    The findings are characteristic of osteomyelitis, soft tissue abscesses, and marked cellulitis all superimposed on ulnar shaft fracture with delayed healing.  There is concomitant infective myositis with muscle belly edema surrounding the marked periosteal inflammation.    Normal radius.                                           US Lower Extremity Veins Left (Final result)  Result time 09/25/24 09:38:54      Final result by Samanta Elder MD (09/25/24 09:38:54)                   Impression:      No evidence of deep venous thrombosis in the left lower extremity.    4.0 cm fluid collection along the medial aspect of the knee, which may represent knee effusion.      Electronically signed by: Samanta Elder  Date:    09/25/2024  Time:    09:38               Narrative:    EXAMINATION:  US LOWER EXTREMITY VEINS LEFT    CLINICAL HISTORY:  Other specified soft tissue disorders    TECHNIQUE:  Duplex and color flow Doppler evaluation and graded compression of the left lower extremity veins was performed.    COMPARISON:  None    FINDINGS:  Left thigh veins: The common femoral, femoral, popliteal, upper greater saphenous, and deep femoral veins are patent and free of thrombus. The veins are normally compressible and have normal phasic flow and augmentation response.    Left calf veins: The visualized calf veins are patent.    Contralateral CFV: The contralateral (right) common femoral vein is patent and free  of thrombus.    Miscellaneous: Fluid collection measuring 3.7 x 3.0 x 4.0 cm noted along the medial aspect of the knee, potentially representing effusion.                                       X-Ray Chest AP Portable (Final result)  Result time 09/25/24 08:51:35      Final result by Antwan Bolton MD (09/25/24 08:51:35)                   Impression:      1.  Reticular interstitial changes throughout the lungs.  Interstitial pulmonary edema versus interstitial infectious process must be considered.    2.  Negative for acute process otherwise.  Stable findings as noted above.      Electronically signed by: Antwan Bolton MD  Date:    09/25/2024  Time:    08:51               Narrative:    EXAMINATION:  XR CHEST AP PORTABLE    CLINICAL HISTORY:  Sepsis;    COMPARISON:  October 5, 2009    FINDINGS:  EKG leads overlie the chest.  Low lung volumes.  Reticular interstitial changes throughout the lungs.  The lungs are free of alveolar opacities.  The cardiac silhouette size is normal. The trachea is midline and the mediastinal width is normal. Negative for focal infiltrate, effusion or pneumothorax. Pulmonary vasculature is normal. Negative for osseous abnormalities. Tortuous aorta.  Marginal spondylosis.                                       X-Ray Forearm Left (Final result)  Result time 09/25/24 08:50:43      Final result by Antwan Bolton MD (09/25/24 08:50:43)                   Impression:      1.  Exuberant callus formation involves the distal ulnar shaft, with lucency in the ulnar shaft.  Considering the continued presence of a needle fragment in the antecubital fossa, an underlying infectious process in the ulnar shaft must be considered.  Clinical correlation is advised.  The patient may benefit from an MRI to further evaluate.    2.  Stable findings as noted above.      Electronically signed by: Antwan Bolton MD  Date:    09/25/2024  Time:    08:50               Narrative:    EXAMINATION:  XR FOREARM  LEFT    CLINICAL HISTORY:  Cellulitis of left upper limb    TECHNIQUE:  AP and lateral views of the left forearm were performed.    COMPARISON:  June 30, 2024    FINDINGS:  Exuberant callus formation surrounds is a present involves the distal ulnar shaft, with lucency in the ulnar shaft.  An infectious process must be considered.    The rest of the visualized osseous structures and soft tissues are intact.    There is a needle fragment within the antecubital fossa.                                       X-Ray Knee Complete 4 or More Views Left (Final result)  Result time 09/25/24 08:46:01      Final result by Antwan Bolton MD (09/25/24 08:46:01)                   Impression:      1.  Knee joint effusion without underlying fracture seen.  Joint effusions can be associated with trauma, infection or synovitis.      Electronically signed by: Antwan Bolton MD  Date:    09/25/2024  Time:    08:46               Narrative:    EXAMINATION:  XR KNEE COMP 4 OR MORE VIEWS LEFT    CLINICAL HISTORY:  Effusion, left knee    TECHNIQUE:  AP, lateral, and bilateral oblique views of the left knee were performed.    COMPARISON:  None    FINDINGS:  There is a large knee joint effusion.  No definite underlying fracture is seen.  Joint spaces are well maintained.  Fabella.                                       The EKG was ordered, reviewed, and independently interpreted by the ED provider.  Interpretation time: 08:28  Rate: 99 BPM  Rhythm: normal sinus rhythm  Interpretation: Moderate voltage criteria from LVH, may be normal variant. Prolonged QT. No STEMI.           The Emergency Provider reviewed the vital signs and test results, which are outlined above.     ED Discussion       1:54 PM: Discussed case with Dr. Garza ([facility]). Dr. Garza agrees with current care and management of pt and accepts admission.   Admitting Service: [facility]  Admitting Physician: Dr. Garza  Admit to: Med/Surg    Dr. Garza  recommends the pt stays NPO.    1:54 PM: Re-evaluated pt. I have discussed test results, shared treatment plan, and the need for admission with patient and family at bedside. Pt and family express understanding at this time and agree with all information. All questions answered. Pt and family have no further questions or concerns at this time. Pt is ready for admit.      ED Course as of 09/25/24 1512   Wed Sep 25, 2024   1105 WBC(!): 15.58 [AB]   1105 Hemoglobin(!): 9.6 [AB]   1105 Hematocrit(!): 28.8 [AB]   1105 Platelet Count: 298 [AB]   1105 Sodium(!): 129 [AB]   1105 Potassium(!): 3.4 [AB]   1105 BUN: 9 [AB]   1105 Creatinine: 0.7 [AB]   1105 Albumin(!): 2.3 [AB]   1105 Phosphorus Level(!): 2.0 [AB]   1105 Troponin I: <0.006 [AB]   1105 CRP(!): 236.9 [AB]   1105 Procalcitonin(!): 0.84 [AB]   1105 BNP: 17 [AB]   1105 Hepatitis C Ab(!): Positive [AB]   1105 Protein, UA(!): Trace [AB]   1105 Appearance, UA: Clear [AB]   1105 Blood, UA(!): 1+ [AB]   1105 NITRITE UA: Negative [AB]   1105 Leukocyte Esterase, UA: Negative [AB]   1105 Amphetamines, Urine(!): Presumptive Positive [AB]   1105 SARS-CoV-2 RNA, Amplification, Qual: Negative [AB]   1105 Influenza A, Molecular: Negative [AB]   1105 Influenza B, Molecular: Negative [AB]   1105 Fluid Color: Yellow [AB]   1105 Fluid Appearance: Cloudy [AB]   1105 WBC, Body Fluid: 03257  Patient presents with concerns for sepsis, hx of IV drug use per records reviewed although patient states he hasn't used drugs and is homeless, he has temp of 102, concerns for cellulitis of left arm vs possible underlying osteo given xray of forearm reviewed along with retained needle in the AC from imaging done in June, also has left knee effusion, arthrocentesis done, WBC over 56991, fluid cloudy and yellow, flu and covid negative, US of leg negative for DVT, lactate normal, CRP markedly elevated, procal mildly elevated, CXR ? Infiltrates, trop and bnp normal, started IV Vanc and Cefepime, MRI  of lumbar spine pending and left arm, he will need admission, ortho consult for possible septic arthritis of left knee, VSS, patient updated on plan of care and need for admission.  [AB]   1502 MRI lumbar spine negative for infection, MRI left forearm concerning for multiple abscesses, cellulitis, old fracture with poor healing, VSS, now afebrile, ortho consulted, agrees with plan of care, keeping patient NPO for likely plans to take to OR for I&D, hospital med to admit.  [AB]      ED Course User Index  [AB] Lise Turk MD     Medical Decision Making  Amount and/or Complexity of Data Reviewed  Labs: ordered. Decision-making details documented in ED Course.  Radiology: ordered. Decision-making details documented in ED Course.  ECG/medicine tests: ordered and independent interpretation performed. Decision-making details documented in ED Course.  Discussion of management or test interpretation with external provider(s): Orthopedics consulted and will be coming to see patient, keep npo    Risk  OTC drugs.  Prescription drug management.  Decision regarding hospitalization.  Diagnosis or treatment significantly limited by social determinants of health.       Additional MDM:   Smoking Cessation: The patient is a smoker. The patient was counseled on smoking cessation for: 4 minutes. The patient was counseled on tobacco related  health complications.   Sepsis:   This patient does have evidence of infective focus  My overall impression is sepsis.  Source: Skin and Soft Tissue (location left arm)  Antibiotics given- Antibiotics     Patient Encounter Information Not Found      Latest lactate reviewed- 0.8  Organ dysfunction indicated by Encephalopathy    Fluid challenge Actual Body weight- Patient will receive 30ml/kg actual body weight to calculate fluid bolus for treatment of septic shock.     Post- resuscitation assessment No - Post resuscitation assessment not needed       Will Not start Pressors- Levophed for MAP of  65  Source control achieved by: IV cefepime and Vanc                ED Medication(s):  Medications   vancomycin - pharmacy to dose (has no administration in time range)   vancomycin 1.75 g in 5 % dextrose 500 mL IVPB (1,750 mg Intravenous New Bag 9/25/24 1352)   ceFEPIme (MAXIPIME) 2 g in D5W 100 mL IVPB (MB+) (has no administration in time range)   lactated ringers infusion ( Intravenous New Bag 9/25/24 1444)   sodium chloride 0.9% flush 10 mL (has no administration in time range)   naloxone 0.4 mg/mL injection 0.02 mg (has no administration in time range)   glucose chewable tablet 16 g (has no administration in time range)   glucose chewable tablet 24 g (has no administration in time range)   glucagon (human recombinant) injection 1 mg (has no administration in time range)   acetaminophen tablet 650 mg (has no administration in time range)   oxyCODONE immediate release tablet 5 mg (has no administration in time range)   morphine injection 2 mg (has no administration in time range)   senna-docusate 8.6-50 mg per tablet 1 tablet (has no administration in time range)   polyethylene glycol packet 17 g (has no administration in time range)   ondansetron injection 4 mg (has no administration in time range)   prochlorperazine injection Soln 5 mg (has no administration in time range)   melatonin tablet 6 mg (has no administration in time range)   dextrose 10% bolus 125 mL 125 mL (has no administration in time range)   dextrose 10% bolus 250 mL 250 mL (has no administration in time range)   sodium chloride 0.9% bolus 1,000 mL 1,000 mL (0 mLs Intravenous Stopped 9/25/24 1023)   acetaminophen tablet 1,000 mg (1,000 mg Oral Given 9/25/24 0912)   sodium chloride 0.9% bolus 1,000 mL 1,000 mL (0 mLs Intravenous Stopped 9/25/24 1023)   potassium bicarbonate disintegrating tablet 25 mEq (25 mEq Oral Given 9/25/24 1019)   ibuprofen tablet 800 mg (800 mg Oral Given 9/25/24 1101)   gadobutroL (GADAVIST) injection 10 mL (6 mLs  Intravenous Given 9/25/24 1239)       New Prescriptions    No medications on file               Scribe Attestation:   Scribe #1: I performed the above scribed service and the documentation accurately describes the services I performed. I attest to the accuracy of the note.     Attending:   Physician Attestation Statement for Scribe #1: I, Lise Turk MD, personally performed the services described in this documentation, as scribed by Ila Bryant, in my presence, and it is both accurate and complete.           Clinical Impression       ICD-10-CM ICD-9-CM   1. Sepsis, due to unspecified organism, unspecified whether acute organ dysfunction present  A41.9 038.9     995.91   2. Weakness  R53.1 780.79   3. Cellulitis of left arm  L03.114 682.3   4. Effusion, left knee  M25.462 719.06   5. Left leg swelling  M79.89 729.81   6. Abscess of left arm  L02.414 682.3   7. IV drug user  F19.90 305.90   8. Effusion of left knee joint  M25.462 719.06   9. Chest pain  R07.9 786.50   10. Arthritis of left knee due to other bacteria  M00.862 040.89     711.46   11. Pyogenic arthritis of left knee joint, due to unspecified organism  M00.9 711.06   12. 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 Tobacco abuse disorder  Z72.0 305.1   14. Hepatitis C test positive  B19.20 070.70       Disposition:   Disposition: Admitted  Condition: Serious         Lise Turk MD  09/25/24 1512

## 2024-09-25 NOTE — HPI
Pt is a 39 YO  male with PMH notable for IV drug abuse, homelessness who presents to the ED for evaluation of L arm and knee pain. Pt reports that he has been having pain all over, but worse over the L arm and L knee over last day. Denies any recent trauma, injury, falls. Denies injections into joints. Reports feeling feverish over the last 4-5 days. In the ED, pt noted to have swelling to L forearm and L knee. Initial VS: Tmax 102.7, , /84, sats 100% on room air. Initial work up: WBC 15, Na 129, , procal 0.84, UDS + amphetamines. Imaging notable for: large L knee effusion, MRI of LUE showed ulnar shaft chronic fracture with callus formation, fracture incompletely healed, also has 2 loculated fluid collections at margins of callus concerning for osteomyelitis, abscesses and cellulitis. Pt underwent aspiration of L knee in the ED, aspirate yellow/cloudy with 62851 WBC with 89% segs, concerning for septic arthritis. Pt received Vanc + Cefepime in the ED. Orthopedics consulted and planning for washout of L knee and forearm today. Hospital medicine consulted for admission

## 2024-09-25 NOTE — PHARMACY MED REC
"Admission Medication History     The home medication history was taken by Jorge Argueta.    You may go to "Admission" then "Reconcile Home Medications" tabs to review and/or act upon these items.     The home medication list has been updated by the Pharmacy department.   Please read ALL comments highlighted in yellow.   Please address this information as you see fit.    Feel free to contact us if you have any questions or require assistance.      Medications listed below were obtained from: Patient/family and Analytic software- [employer]  (Not in a hospital admission)        Jorge Argueta  [id number]      Current Outpatient Medications on File Prior to Encounter   Medication Sig Dispense Refill Last Dose   NO CURRENT OUTPATIENT MEDICATIONS                    .          "

## 2024-09-25 NOTE — ASSESSMENT & PLAN NOTE
Hyponatremia is likely due to Dehydration/hypovolemia. The patient's most recent sodium results are listed below.  Recent Labs     09/25/24  0858   *     Plan  - Correct the sodium by 4-6mEq in 24 hours.   - Will treat the hyponatremia with IV fluids as follows: NS at 75/hr   - Monitor sodium Daily.   - Patient hyponatremia is  undergoing treatment   - likely hypovolemic in setting of sepsis

## 2024-09-25 NOTE — H&P
[place] - Surgery ([facility])  [facility]  History & Physical    Patient Name: Georges Daniels  MRN: [id number]  Patient Class: IP- Inpatient  Admission Date: 9/25/2024  Attending Physician: Nataliya Garza MD  Primary Care Provider: Sally Primary Doctor         Patient information was obtained from patient and ER records.     Subjective:     Principal Problem:Septic arthritis of knee, left    Chief Complaint:   Chief Complaint   Patient presents with    Leg Swelling     Pt. Reports pain all over with lower extremity swelling and pain to the left arm from an old Fx. And left knee pain.         HPI: Pt is a 39 YO  male with PMH notable for IV drug abuse, homelessness who presents to the ED for evaluation of L arm and knee pain. Pt reports that he has been having pain all over, but worse over the L arm and L knee over last day. Denies any recent trauma, injury, falls. Denies injections into joints. Reports feeling feverish over the last 4-5 days. In the ED, pt noted to have swelling to L forearm and L knee. Initial VS: Tmax 102.7, , /84, sats 100% on room air. Initial work up: WBC 15, Na 129, , procal 0.84, UDS + amphetamines. Imaging notable for: large L knee effusion, MRI of LUE showed ulnar shaft chronic fracture with callus formation, fracture incompletely healed, also has 2 loculated fluid collections at margins of callus concerning for osteomyelitis, abscesses and cellulitis. Pt underwent aspiration of L knee in the ED, aspirate yellow/cloudy with 06861 WBC with 89% segs, concerning for septic arthritis. Pt received Vanc + Cefepime in the ED. Orthopedics consulted and planning for washout of L knee and forearm today. Hospital medicine consulted for admission     Past Medical History:   Diagnosis Date    Medical history non-contributory        Past Surgical History:   Procedure Laterality Date    NO PAST SURGERIES         Review of patient's allergies indicates:  No Known  Allergies    No current facility-administered medications on file prior to encounter.     Current Outpatient Medications on File Prior to Encounter   Medication Sig    [DISCONTINUED] ibuprofen (ADVIL,MOTRIN) 600 MG tablet Take 1 tablet (600 mg total) by mouth every 6 (six) hours as needed for Pain.     Family History    None       Tobacco Use    Smoking status: Every Day     Current packs/day: 1.00     Types: Cigarettes    Smokeless tobacco: Never   Substance and Sexual Activity    Alcohol use: No    Drug use: Not Currently    Sexual activity: Not Currently     Review of Systems   Reason unable to perform ROS: ROS limited due to patient participation.   Constitutional:  Positive for activity change, fatigue and fever. Negative for chills.   Musculoskeletal:  Positive for arthralgias and joint swelling.   Skin:  Positive for wound.     Objective:     Vital Signs (Most Recent):  Temp: 97.5 °F (36.4 °C) (09/25/24 1330)  Pulse: 62 (09/25/24 1500)  Resp: 19 (09/25/24 1500)  BP: (!) 131/90 (09/25/24 1500)  SpO2: 100 % (09/25/24 1500) Vital Signs (24h Range):  Temp:  [97.5 °F (36.4 °C)-102.7 °F (39.3 °C)] 97.5 °F (36.4 °C)  Pulse:  [] 62  Resp:  [17-20] 19  SpO2:  [97 %-100 %] 100 %  BP: (106-134)/(57-90) 131/90     Weight: 66.6 kg (146 lb 13.2 oz)  Body mass index is 19.37 kg/m².     Physical Exam  Vitals and nursing note reviewed.   Constitutional:       Comments: Ill appearing, disheveled, minimally cooperative, irritable    Cardiovascular:      Rate and Rhythm: Normal rate and regular rhythm.   Pulmonary:      Effort: Pulmonary effort is normal.      Breath sounds: Normal breath sounds. No wheezing or rales.   Abdominal:      General: Bowel sounds are normal. There is no distension.      Palpations: Abdomen is soft.      Tenderness: There is no abdominal tenderness.   Genitourinary:     Comments: deferred  Musculoskeletal:      Comments: L forearm swelling, nonerythematous but has small pustule over the area. L  "knee swelling, ROM limited due to swelling     Skin:     General: Skin is warm and dry.      Comments: Has excoriations and small pustules to bilateral lower legs    Psychiatric:      Comments: Irritable                 Significant Labs: All pertinent labs within the past 24 hours have been reviewed.    Significant Imaging: I have reviewed all pertinent imaging results/findings within the past 24 hours.  Assessment/Plan:     * Septic arthritis of knee, left  - s/p aspiration in the ED with 33892 WBC, 89% polys  - orthopedics consulted- discussed with Dr. Von barth planned for washout today   - NPO; IVF   - Continue empiric IV Vanc + Cefepime pending aspirate culture/operative culture; vanc dosing and monitoring per pharmacy  - pain control with PO Roxicodone and IV mOrphine as needed        Abscess of left arm  - MRI concerning for abscess, osteomyelitis and cellulitis of L arm   - orthopedics consulted- discussed with Dr. Von barth planned for washout today, will likely need repair of fracture once infection more controlled   - continue IV Antibiotics as above       Sepsis  This patient does have evidence of infective focus  My overall impression is sepsis.  Source: Skin and Soft Tissue (location L arm and L knee )  Antibiotics given-   Antibiotics (72h ago, onward)      Start     Stop Route Frequency Ordered    09/25/24 1815  ceFEPIme (MAXIPIME) 2 g in D5W 100 mL IVPB (MB+)         -- IV Every 8 hours (non-standard times) 09/25/24 1144    09/25/24 1145  vancomycin 1.75 g in 5 % dextrose 500 mL IVPB         -- IV Once 09/25/24 1030    09/25/24 1013  vancomycin - pharmacy to dose  (vancomycin IVPB (PEDS and ADULTS))        Placed in "And" Linked Group    -- IV pharmacy to manage frequency 09/25/24 1014          Latest lactate reviewed-  Recent Labs   Lab 09/25/24  1310   LACTATE 0.8     Organ dysfunction indicated by  None     Fluid challenge s/p sepsis fluids in the Ed      Post- resuscitation assessment No - " Post resuscitation assessment not needed       Will Not start Pressors   Source control achieved by: IV antibiotics, surgical washout     Hepatitis C antibody test positive  - confirmatory test pending, likely in setting of IVDA       Hyponatremia  Hyponatremia is likely due to Dehydration/hypovolemia. The patient's most recent sodium results are listed below.  Recent Labs     09/25/24  0858   *     Plan  - Correct the sodium by 4-6mEq in 24 hours.   - Will treat the hyponatremia with IV fluids as follows: NS at 75/hr   - Monitor sodium Daily.   - Patient hyponatremia is  undergoing treatment   - likely hypovolemic in setting of sepsis     IV drug abuse  Prior hx of IVDA, UDS on admission + for methamphetamines   Complicating management of above conditions         VTE Risk Mitigation (From admission, onward)           Ordered     Reason for No Pharmacological VTE Prophylaxis  Once        Question:  Reasons:  Answer:  Physician Provided (leave comment)  Comment:  surgical intervention planned    09/25/24 1434     IP VTE HIGH RISK PATIENT  Once         09/25/24 1434     Place sequential compression device  Until discontinued         09/25/24 1434                                    Nataliya Garza MD  Department of Hospital Medicine  '[place] - Surgery ([facility])

## 2024-09-25 NOTE — ANESTHESIA PROCEDURE NOTES
Intubation    Date/Time: 9/25/2024 4:14 PM    Performed by: Panda Bobo CRNA  Authorized by: Yonatan Hair MD    Intubation:     Induction:  Rapid sequence induction    Mask Ventilation:  Not attempted    Attempts:  1    Attempted By:  CRNA    Method of Intubation:  Video laryngoscopy    Blade:  Mendes 3    Laryngeal View Grade: Grade IIA - cords partially seen      Difficult Airway Encountered?: No      Complications:  None    Airway Device:  Oral endotracheal tube    Airway Device Size:  7.5    Style/Cuff Inflation:  Cuffed (inflated to minimal occlusive pressure)    Tube secured:  21    Secured at:  The lips    Placement Verified By:  Capnometry    Complicating Factors:  None    Findings Post-Intubation:  BS equal bilateral  Notes:      Rsi with cricoid, small mouth opening post sux.

## 2024-09-25 NOTE — SUBJECTIVE & OBJECTIVE
Past Medical History:   Diagnosis Date    Medical history non-contributory        Past Surgical History:   Procedure Laterality Date    NO PAST SURGERIES         Review of patient's allergies indicates:  No Known Allergies    No current facility-administered medications on file prior to encounter.     Current Outpatient Medications on File Prior to Encounter   Medication Sig    [DISCONTINUED] ibuprofen (ADVIL,MOTRIN) 600 MG tablet Take 1 tablet (600 mg total) by mouth every 6 (six) hours as needed for Pain.     Family History    None       Tobacco Use    Smoking status: Every Day     Current packs/day: 1.00     Types: Cigarettes    Smokeless tobacco: Never   Substance and Sexual Activity    Alcohol use: No    Drug use: Not Currently    Sexual activity: Not Currently     Review of Systems   Reason unable to perform ROS: ROS limited due to patient participation.   Constitutional:  Positive for activity change, fatigue and fever. Negative for chills.   Musculoskeletal:  Positive for arthralgias and joint swelling.   Skin:  Positive for wound.     Objective:     Vital Signs (Most Recent):  Temp: 97.5 °F (36.4 °C) (09/25/24 1330)  Pulse: 62 (09/25/24 1500)  Resp: 19 (09/25/24 1500)  BP: (!) 131/90 (09/25/24 1500)  SpO2: 100 % (09/25/24 1500) Vital Signs (24h Range):  Temp:  [97.5 °F (36.4 °C)-102.7 °F (39.3 °C)] 97.5 °F (36.4 °C)  Pulse:  [] 62  Resp:  [17-20] 19  SpO2:  [97 %-100 %] 100 %  BP: (106-134)/(57-90) 131/90     Weight: 66.6 kg (146 lb 13.2 oz)  Body mass index is 19.37 kg/m².     Physical Exam  Vitals and nursing note reviewed.   Constitutional:       Comments: Ill appearing, disheveled, minimally cooperative, irritable    Cardiovascular:      Rate and Rhythm: Normal rate and regular rhythm.   Pulmonary:      Effort: Pulmonary effort is normal.      Breath sounds: Normal breath sounds. No wheezing or rales.   Abdominal:      General: Bowel sounds are normal. There is no distension.      Palpations:  Abdomen is soft.      Tenderness: There is no abdominal tenderness.   Genitourinary:     Comments: deferred  Musculoskeletal:      Comments: L forearm swelling, nonerythematous but has small pustule over the area. L knee swelling, ROM limited due to swelling     Skin:     General: Skin is warm and dry.      Comments: Has excoriations and small pustules to bilateral lower legs    Psychiatric:      Comments: Irritable                 Significant Labs: All pertinent labs within the past 24 hours have been reviewed.    Significant Imaging: I have reviewed all pertinent imaging results/findings within the past 24 hours.

## 2024-09-25 NOTE — ASSESSMENT & PLAN NOTE
- s/p aspiration in the ED with 13570 WBC, 89% polys  - orthopedics consulted- discussed with Dr. Lamar- pt planned for washout today   - NPO; IVF   - Continue empiric IV Vanc + Cefepime pending aspirate culture/operative culture; vanc dosing and monitoring per pharmacy  - pain control with PO Roxicodone and IV mOrphine as needed
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
## 2024-09-26 PROBLEM — R78.81 STAPHYLOCOCCUS AUREUS BACTEREMIA: Status: ACTIVE | Noted: 2024-09-26

## 2024-09-26 PROBLEM — B95.61 STAPHYLOCOCCUS AUREUS BACTEREMIA: Status: ACTIVE | Noted: 2024-09-26

## 2024-09-26 LAB
ANION GAP SERPL CALC-SCNC: 7 MMOL/L (ref 8–16)
AORTIC ROOT ANNULUS: 3.39 CM
ASCENDING AORTA: 3 CM
AV INDEX (PROSTH): 0.72
AV MEAN GRADIENT: 11 MMHG
AV PEAK GRADIENT: 17 MMHG
AV VALVE AREA BY VELOCITY RATIO: 1.86 CM²
AV VALVE AREA: 2.14 CM²
AV VELOCITY RATIO: 0.62
BASOPHILS # BLD AUTO: 0.02 K/UL (ref 0–0.2)
BASOPHILS NFR BLD: 0.2 % (ref 0–1.9)
BSA FOR ECHO PROCEDURE: 1.85 M2
BUN SERPL-MCNC: 11 MG/DL (ref 6–20)
CALCIUM SERPL-MCNC: 7.8 MG/DL (ref 8.7–10.5)
CHLORIDE SERPL-SCNC: 103 MMOL/L (ref 95–110)
CO2 SERPL-SCNC: 22 MMOL/L (ref 23–29)
CREAT SERPL-MCNC: 0.7 MG/DL (ref 0.5–1.4)
CV ECHO LV RWT: 0.51 CM
DIFFERENTIAL METHOD BLD: ABNORMAL
DOP CALC AO PEAK VEL: 2.07 M/S
DOP CALC AO VTI: 34 CM
DOP CALC LVOT AREA: 3 CM2
DOP CALC LVOT DIAMETER: 1.95 CM
DOP CALC LVOT PEAK VEL: 1.29 M/S
DOP CALC LVOT STROKE VOLUME: 72.83 CM3
DOP CALC RVOT PEAK VEL: 0.57 M/S
DOP CALC RVOT VTI: 8.2 CM
DOP CALCLVOT PEAK VEL VTI: 24.4 CM
E WAVE DECELERATION TIME: 167.86 MSEC
E/A RATIO: 1.22
E/E' RATIO: 3.96 M/S
ECHO LV POSTERIOR WALL: 1.15 CM (ref 0.6–1.1)
EOSINOPHIL # BLD AUTO: 0 K/UL (ref 0–0.5)
EOSINOPHIL NFR BLD: 0.2 % (ref 0–8)
ERYTHROCYTE [DISTWIDTH] IN BLOOD BY AUTOMATED COUNT: 13.8 % (ref 11.5–14.5)
EST. GFR  (NO RACE VARIABLE): >60 ML/MIN/1.73 M^2
FRACTIONAL SHORTENING: 36 % (ref 28–44)
GLUCOSE SERPL-MCNC: 123 MG/DL (ref 70–110)
GRAM STN SPEC: NORMAL
HCT VFR BLD AUTO: 26.4 % (ref 40–54)
HCV RNA SERPL QL NAA+PROBE: NOT DETECTED
HCV RNA SPEC NAA+PROBE-ACNC: NOT DETECTED IU/ML
HGB BLD-MCNC: 8.6 G/DL (ref 14–18)
IMM GRANULOCYTES # BLD AUTO: 0.09 K/UL (ref 0–0.04)
IMM GRANULOCYTES NFR BLD AUTO: 0.9 % (ref 0–0.5)
INTERVENTRICULAR SEPTUM: 1.06 CM (ref 0.6–1.1)
IVC DIAMETER: 1.62 CM
IVRT: 57.09 MSEC
LA MAJOR: 5.24 CM
LA MINOR: 5 CM
LA WIDTH: 3.2 CM
LEFT ATRIUM SIZE: 4.04 CM
LEFT ATRIUM VOLUME INDEX: 29.9 ML/M2
LEFT ATRIUM VOLUME: 56.23 CM3
LEFT INTERNAL DIMENSION IN SYSTOLE: 2.9 CM (ref 2.1–4)
LEFT VENTRICLE DIASTOLIC VOLUME INDEX: 49.76 ML/M2
LEFT VENTRICLE DIASTOLIC VOLUME: 93.54 ML
LEFT VENTRICLE MASS INDEX: 94 G/M2
LEFT VENTRICLE SYSTOLIC VOLUME INDEX: 17.1 ML/M2
LEFT VENTRICLE SYSTOLIC VOLUME: 32.13 ML
LEFT VENTRICULAR INTERNAL DIMENSION IN DIASTOLE: 4.52 CM (ref 3.5–6)
LEFT VENTRICULAR MASS: 177.38 G
LV LATERAL E/E' RATIO: 3.88 M/S
LV SEPTAL E/E' RATIO: 4.04 M/S
LVED V (TEICH): 93.54 ML
LVES V (TEICH): 32.13 ML
LVOT MG: 5.28 MMHG
LVOT MV: 1.13 CM/S
LYMPHOCYTES # BLD AUTO: 0.7 K/UL (ref 1–4.8)
LYMPHOCYTES NFR BLD: 6.5 % (ref 18–48)
MCH RBC QN AUTO: 27.8 PG (ref 27–31)
MCHC RBC AUTO-ENTMCNC: 32.6 G/DL (ref 32–36)
MCV RBC AUTO: 85 FL (ref 82–98)
MONOCYTES # BLD AUTO: 0.7 K/UL (ref 0.3–1)
MONOCYTES NFR BLD: 6.8 % (ref 4–15)
MRSA ID BY PCR: POSITIVE
MV PEAK A VEL: 0.76 M/S
MV PEAK E VEL: 0.93 M/S
MV STENOSIS PRESSURE HALF TIME: 48.68 MS
MV VALVE AREA P 1/2 METHOD: 4.52 CM2
NEUTROPHILS # BLD AUTO: 8.7 K/UL (ref 1.8–7.7)
NEUTROPHILS NFR BLD: 85.4 % (ref 38–73)
NRBC BLD-RTO: 0 /100 WBC
PATH INTERP FLD-IMP: NORMAL
PISA MRMAX VEL: 2.89 M/S
PISA TR MAX VEL: 2.6 M/S
PLATELET # BLD AUTO: 296 K/UL (ref 150–450)
PMV BLD AUTO: 11 FL (ref 9.2–12.9)
POTASSIUM SERPL-SCNC: 3.6 MMOL/L (ref 3.5–5.1)
PV MEAN GRADIENT: 1 MMHG
RA MAJOR: 3.93 CM
RA PRESSURE ESTIMATED: 3 MMHG
RA WIDTH: 3 CM
RBC # BLD AUTO: 3.09 M/UL (ref 4.6–6.2)
RV TB RVSP: 6 MMHG
SODIUM SERPL-SCNC: 132 MMOL/L (ref 136–145)
STAPH AUREUS ID BY PCR: POSITIVE
STJ: 3.23 CM
TDI LATERAL: 0.24 M/S
TDI SEPTAL: 0.23 M/S
TDI: 0.24 M/S
TR MAX PG: 27 MMHG
TR MEAN GRADIENT: 27 MMHG
TRICUSPID ANNULAR PLANE SYSTOLIC EXCURSION: 2.02 CM
TV REST PULMONARY ARTERY PRESSURE: 30 MMHG
VANCOMYCIN SERPL-MCNC: 9.1 UG/ML
WBC # BLD AUTO: 10.13 K/UL (ref 3.9–12.7)
Z-SCORE OF LEFT VENTRICULAR DIMENSION IN END DIASTOLE: -1.5
Z-SCORE OF LEFT VENTRICULAR DIMENSION IN END SYSTOLE: -0.87

## 2024-09-26 PROCEDURE — 80048 BASIC METABOLIC PNL TOTAL CA: CPT | Performed by: ORTHOPAEDIC SURGERY

## 2024-09-26 PROCEDURE — 63600175 PHARM REV CODE 636 W HCPCS: Performed by: ORTHOPAEDIC SURGERY

## 2024-09-26 PROCEDURE — 80202 ASSAY OF VANCOMYCIN: CPT | Performed by: ORTHOPAEDIC SURGERY

## 2024-09-26 PROCEDURE — 25000003 PHARM REV CODE 250: Performed by: ORTHOPAEDIC SURGERY

## 2024-09-26 PROCEDURE — 85025 COMPLETE CBC W/AUTO DIFF WBC: CPT | Performed by: ORTHOPAEDIC SURGERY

## 2024-09-26 PROCEDURE — 21400001 HC TELEMETRY ROOM

## 2024-09-26 PROCEDURE — 36415 COLL VENOUS BLD VENIPUNCTURE: CPT | Performed by: ORTHOPAEDIC SURGERY

## 2024-09-26 PROCEDURE — 63600175 PHARM REV CODE 636 W HCPCS: Performed by: INTERNAL MEDICINE

## 2024-09-26 PROCEDURE — 25000003 PHARM REV CODE 250: Performed by: INTERNAL MEDICINE

## 2024-09-26 PROCEDURE — 99223 1ST HOSP IP/OBS HIGH 75: CPT | Mod: NSCH,,, | Performed by: INTERNAL MEDICINE

## 2024-09-26 PROCEDURE — 97162 PT EVAL MOD COMPLEX 30 MIN: CPT

## 2024-09-26 PROCEDURE — 97530 THERAPEUTIC ACTIVITIES: CPT

## 2024-09-26 PROCEDURE — 99024 POSTOP FOLLOW-UP VISIT: CPT | Mod: ,,, | Performed by: ORTHOPAEDIC SURGERY

## 2024-09-26 RX ORDER — HEPARIN SODIUM 5000 [USP'U]/ML
5000 INJECTION, SOLUTION INTRAVENOUS; SUBCUTANEOUS EVERY 8 HOURS
Status: DISCONTINUED | OUTPATIENT
Start: 2024-09-26 | End: 2024-09-26

## 2024-09-26 RX ADMIN — SENNOSIDES AND DOCUSATE SODIUM 1 TABLET: 50; 8.6 TABLET ORAL at 09:09

## 2024-09-26 RX ADMIN — VANCOMYCIN HYDROCHLORIDE 1000 MG: 1 INJECTION, POWDER, LYOPHILIZED, FOR SOLUTION INTRAVENOUS at 05:09

## 2024-09-26 RX ADMIN — ACETAMINOPHEN 650 MG: 325 TABLET ORAL at 09:09

## 2024-09-26 RX ADMIN — CEFEPIME 2 G: 2 INJECTION, POWDER, FOR SOLUTION INTRAVENOUS at 04:09

## 2024-09-26 RX ADMIN — OXYCODONE HYDROCHLORIDE 5 MG: 5 TABLET ORAL at 05:09

## 2024-09-26 RX ADMIN — CEFEPIME 2 G: 2 INJECTION, POWDER, FOR SOLUTION INTRAVENOUS at 06:09

## 2024-09-26 RX ADMIN — MORPHINE SULFATE 2 MG: 4 INJECTION INTRAVENOUS at 09:09

## 2024-09-26 RX ADMIN — ACETAMINOPHEN 650 MG: 325 TABLET ORAL at 05:09

## 2024-09-26 RX ADMIN — CEFEPIME 2 G: 2 INJECTION, POWDER, FOR SOLUTION INTRAVENOUS at 09:09

## 2024-09-26 RX ADMIN — SENNOSIDES AND DOCUSATE SODIUM 1 TABLET: 50; 8.6 TABLET ORAL at 08:09

## 2024-09-26 NOTE — PLAN OF CARE
"Remained stable this shift, mostly irritable & withdrawn. No complaints of pain throughout entire shift. Bed in low & locked position with call light in reach. Bed alarm on & audible. Female visitor sleeping in bed with pt. Will continue to monitor. 24h cc complete.    /60   Pulse (!) 115   Temp 99.2 °F (37.3 °C)   Resp 16   Ht 6' 1" (1.854 m)   Wt 66.6 kg (146 lb 13.2 oz)   SpO2 96%   BMI 19.37 kg/m²     "

## 2024-09-26 NOTE — PROGRESS NOTES
[facility] Medicine  Progress Note    Patient Name: Georges Daniels  MRN: [id number]  Patient Class: IP- Inpatient   Admission Date: 9/25/2024  Length of Stay: 1 days  Attending Physician: Nataliya Garza MD  Primary Care Provider: Sally, Primary Doctor        Subjective:     Principal Problem:Septic arthritis of knee, left        HPI:  Pt is a 39 YO  male with PMH notable for IV drug abuse, homelessness who presents to the ED for evaluation of L arm and knee pain. Pt reports that he has been having pain all over, but worse over the L arm and L knee over last day. Denies any recent trauma, injury, falls. Denies injections into joints. Reports feeling feverish over the last 4-5 days. In the ED, pt noted to have swelling to L forearm and L knee. Initial VS: Tmax 102.7, , /84, sats 100% on room air. Initial work up: WBC 15, Na 129, , procal 0.84, UDS + amphetamines. Imaging notable for: large L knee effusion, MRI of LUE showed ulnar shaft chronic fracture with callus formation, fracture incompletely healed, also has 2 loculated fluid collections at margins of callus concerning for osteomyelitis, abscesses and cellulitis. Pt underwent aspiration of L knee in the ED, aspirate yellow/cloudy with 43965 WBC with 89% segs, concerning for septic arthritis. Pt received Vanc + Cefepime in the ED. Orthopedics consulted and planning for washout of L knee and forearm today. Hospital medicine consulted for admission     Overview/Hospital Course:  Underwent washout of L knee and L forearm with ortho Dr. Lamar on 09/25/2024. Blood cultures from admission with staph in 2/2 sets, likely SST source of infection. Remains on IV Vanc + Cefepime. Infectious disease consulted for antibiotics recommendations.     Interval History: NAEON. Pt seen and examined with RN at bedside. Tmax 100.7. Pt reports pain to L arm and knee, somewhat improved since yesterday. Reports tingling to  fingers and toes, SILT. Blood cultures +, repeat cultures ordered.    Review of Systems  Objective:     Vital Signs (Most Recent):  Temp: 98.8 °F (37.1 °C) (09/26/24 1159)  Pulse: 89 (09/26/24 1159)  Resp: 16 (09/26/24 1159)  BP: (!) 98/54 (09/26/24 1159)  SpO2: 100 % (09/26/24 1159) Vital Signs (24h Range):  Temp:  [97.4 °F (36.3 °C)-100.7 °F (38.2 °C)] 98.8 °F (37.1 °C)  Pulse:  [] 89  Resp:  [10-21] 16  SpO2:  [96 %-100 %] 100 %  BP: ()/(54-90) 98/54     Weight: 66.6 kg (146 lb 13.2 oz)  Body mass index is 19.37 kg/m².    Intake/Output Summary (Last 24 hours) at 9/26/2024 1436  Last data filed at 9/26/2024 0830  Gross per 24 hour   Intake 2893.64 ml   Output 650 ml   Net 2243.64 ml         Physical Exam  Vitals and nursing note reviewed.   Constitutional:       General: He is not in acute distress.     Appearance: He is ill-appearing.   Cardiovascular:      Rate and Rhythm: Regular rhythm. Tachycardia present.      Heart sounds: No murmur heard.  Pulmonary:      Effort: Pulmonary effort is normal.      Breath sounds: Normal breath sounds. No wheezing, rhonchi or rales.   Abdominal:      General: Bowel sounds are normal. There is no distension.      Palpations: Abdomen is soft.      Tenderness: There is no abdominal tenderness.   Musculoskeletal:      Comments: LUE and LLE with ace wrap in place, able to move fingers and toes, SILT    Skin:     Comments: Pustules and excoriations to BLE    Neurological:      Mental Status: He is alert.      Comments: Tremulous              Significant Labs: All pertinent labs within the past 24 hours have been reviewed.    Significant Imaging: I have reviewed all pertinent imaging results/findings within the past 24 hours.    Assessment/Plan:      * Septic arthritis of knee, left  - s/p aspiration in the ED with 75472 WBC, 89% polys  - orthopedics consulted- s/p washout of L knee 09/25 with Dr. Lamar   - Continue empiric IV Vanc + Cefepime; vanc dosing and monitoring  "per pharmacy  - operative cultures pending   - ID consult for abx recommendations   - pain control with PO Roxicodone and IV mOrphine as needed  - PT/OT         Abscess of left arm  - MRI concerning for abscess, osteomyelitis and cellulitis of L arm   - orthopedics consulted; s/p washout 09/25/2024 with Dr. Cintron   - discussed with Dr. Lamar- pt will likely need repeat washout in coming days  - prelim op culture with rare GPC on gram stain   - continue IV Antibiotics as above    - ID consult for abx recommendations     Sepsis  This patient does have evidence of infective focus  My overall impression is sepsis.  Source: Skin and Soft Tissue (location L arm and L knee )  Antibiotics given-   Antibiotics (72h ago, onward)      Start     Stop Route Frequency Ordered    09/26/24 0400  vancomycin (VANCOCIN) 1,000 mg in D5W 250 mL IVPB (admixture device)         -- IV Every 12 hours (non-standard times) 09/26/24 0306    09/25/24 1815  ceFEPIme (MAXIPIME) 2 g in D5W 100 mL IVPB (MB+)         -- IV Every 8 hours (non-standard times) 09/25/24 1144    09/25/24 1013  vancomycin - pharmacy to dose  (vancomycin IVPB (PEDS and ADULTS))        Placed in "And" Linked Group    -- IV pharmacy to manage frequency 09/25/24 1014          Latest lactate reviewed-  Recent Labs   Lab 09/25/24  1310   LACTATE 0.8       Organ dysfunction indicated by  None     Fluid challenge s/p sepsis fluids in the Ed      Post- resuscitation assessment No - Post resuscitation assessment not needed       Will Not start Pressors   Source control achieved by: IV antibiotics, surgical washout     Staphylococcus aureus bacteremia  - likely skin/soft tissue source  - repeat blood cultures in AM   - obtain TTE to r/o vegetation   - ID consult for abx recommendations       Hepatitis C antibody test positive  - confirmatory test pending, likely in setting of IVDA       Hyponatremia  Hyponatremia is likely due to Dehydration/hypovolemia. The patient's most recent " sodium results are listed below.  Recent Labs     09/25/24  0858 09/26/24  0602   * 132*       Plan  - Correct the sodium by 4-6mEq in 24 hours.   - Will treat the hyponatremia with IV fluids as follows: s/p NS at 75/hr   - Monitor sodium Daily.   - Patient hyponatremia is  improving   - likely hypovolemic in setting of sepsis   - stop fluids     IV drug abuse  Prior hx of IVDA, UDS on admission + for methamphetamines   Complicating management of above conditions   Will need LTAC placement for IV abx on discharge         VTE Risk Mitigation (From admission, onward)           Ordered     heparin (porcine) injection 5,000 Units  Every 8 hours         09/26/24 1452     Reason for No Pharmacological VTE Prophylaxis  Once        Question:  Reasons:  Answer:  Physician Provided (leave comment)  Comment:  surgical intervention planned    09/25/24 1434     IP VTE HIGH RISK PATIENT  Once         09/25/24 1434     Place sequential compression device  Until discontinued         09/25/24 1434                    Discharge Planning   HAYLEE:      Code Status: Full Code   Is the patient medically ready for discharge?:     Reason for patient still in hospital (select all that apply): Patient new problem, Patient trending condition, Laboratory test, Treatment, Imaging, Consult recommendations, PT / OT recommendations, and Pending disposition  Discharge Plan A: Jesus Garza MD  Department of Hospital Medicine   [facility] - Telemetry ([facility])

## 2024-09-26 NOTE — PLAN OF CARE
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]  Arthritis of left knee due to other bacteria [M00.862]  Effusion, left knee [M25.462]  Pyogenic arthritis of left knee joint, due to unspecified organism [M00.9]  Sepsis, due to unspecified organism, unspecified whether acute organ dysfunction present [A41.9]  Hepatitis C test positive [B19.20]    Admission Date: 9/25/2024  Expected Discharge Date:     Transition of Care Barriers: Homeless, Substance Abuse, Social    Payor: MEDICAID / Plan: [employer] / Product Type: Managed Medicaid /     Extended Emergency Contact Information  Primary Emergency Contact: Judi Eason   [facility]  Home Phone: [phone]  Mobile Phone: [phone]  Relation: Mother    Discharge Plan A: Shelter  Discharge Plan B: Long-term acute care facility (LTAC)    No Pharmacies Listed    Initial Assessment (most recent)       Adult Discharge Assessment - 09/26/24 1232          Discharge Assessment    Assessment Type Discharge Planning Assessment     Confirmed/corrected address, phone number and insurance Yes     Confirmed Demographics Correct on Facesheet     Source of Information patient     Communicated HAYLEE with patient/caregiver Date not available/Unable to determine     Reason For Admission Septic arthritits of knee, left     People in Home alone     Facility Arrived From: Homeless     Do you expect to return to your current living situation? Other (see comments)     Do you have help at home or someone to help you manage your care at home? No     Prior to hospitilization cognitive status:  Alert/Oriented     Current cognitive status: Alert/Oriented     Walking or Climbing Stairs Difficulty no     Dressing/Bathing Difficulty no     Equipment Currently Used at Home none     Readmission within 30 days? No     Patient currently being followed by outpatient case management? No     Do you currently have service(s) that help you manage your care at home? No     Do you have prescription coverage? Yes     Do you have any problems affording any of your prescribed medications? TBD     Who is going to help you get home at discharge? TBD by hospital progress     How do you get to doctors appointments? other (see comments)   bike    Are you on dialysis? No     Do you take coumadin? No     Discharge Plan A Shelter     Discharge Plan B Long-term acute care facility (LTAC)     DME Needed Upon Discharge  none     Discharge Plan discussed with: Patient     Transition of Care Barriers Homeless;Substance Abuse;Social                   SW met with patient at bedside to complete discharge assessment. Pt reports being homeless. Pt states he normally is around the [employer] on [address] and rides his bike for transportation needs. Pt reports having no help from family/friends. SW discussed shelter resources in  ([facility] and [facility]), if interested. Pt reports he does not have an ID so he would not be eligible for placement at [facility]. SW inquired about substance use/alcohol use history; pt denied. Per chart review, pt  is noted to have an IV drug use history.     Discharge disposition pending hospital course. Pt may require IV ABX with post-acute placement at discharge.     Pt's whiteboard updated with CM contact and anticipated discharge disposition. SW to remain available as needed.

## 2024-09-26 NOTE — OP NOTE
"DATE OF SURGERY:  09/25/2024      PREOPERATIVE DIAGNOSIS:   Left knee septic arthritis  Left forearm infected nonunion with abscess     POSTOPERATIVE DIAGNOSIS:    Left knee septic arthritis  2.   Left forearm infected nonunion with abscess      OPERATIVE PROCEDURES:   Left knee arthroscopy with irrigation/debridement and chondroplasty   Left forearm irrigation and debridement of subcutaneous tissues, fascia, muscle and bone    SURGEON:  Morena Lamar DO    ANESTHESIA:  Gen ETA    ESTIMATED BLOOD LOSS:  5ml    COMPLICATIONS:  None.    INDICATIONS:  Georges Daniels is a 38 y.o. year-old, male who had had a sudden onset of left knee pain.  He recalled no injury.  He had increasing knee pain, malaise and fever.   On physical exam, he appeared acutely ill appearing.  He had a large effusion and pain with ROM.   He had an aspiration which showed cloudy fluid with a cell count over .   Risks and benefits of surgery were discussed with the patient, including failure of the procedure to relieve symptoms, need for further surgery; damage to nerves, arteries, tendons, veins; risk of blood clots, pneumonia, the risk inherent to general  anesthesia.  The risk of infection was discussed with the patient.  Patient was given the opportunity to ask questions.  When his questions were satisfactorily answered, he decided to proceed with surgery.  The consent was signed electronically and saved to the electronic health record.      The patient was seen and identified in the Preoperative Holding Area.  The left knee was marked with the word "yes" and my initials.      DESCRIPTION OF THE PROCEDURE:  The patient was taken to the Operative Suite and placed supine on the operative table. Anesthesia was induced and the patient was intubated.     Once the patient was anesthetized, he was positioned on the table with the foot end of the table bent to 90 degrees.   The operative leg was placed in the leg woods.  The " non-operative leg was placed over padding.      The entire right leg and foot were then sterilely prepped and draped in the standard fashion.     A timeout was performed identifying the patient as Georges Daniels and the consented procedure as left knee arthroscopic irrigation and debridement.  It was confirmed that the patient would not receive perioperative antibiotics until cultures were taken.     Medial and lateral parapatellar portals were established. Once the obturator had been removed from the camera trocar, there was a large effusion of synovial fluid drained.  This fluid was collected for cell count, crystals, and cultures.  The camera was then inserted into the joint and the joint survey carried out.  The synovium was noted to signficantly injected and inflamed.     Attention was turned to the medial joint space and the medial meniscus was noted to be intact.  There were chondral flaps on the medial femoral condyle.  This was resected back to a stable point using the arthroscopic shaver.  The meniscus was intact.     The articular cartilage of the tibial plateau was noted to be in good condition.     Attention was then turned to the intercondylar notch and the ACL was noted to be intact.    Attention was then turned to the lateral compartment where the meniscus was noted to be intact.   The articular cartilage of the lateral femoral condyle and lateral tibial plateau was noted to be in pristine condition.        The knee was then copiously irrigated for a total of 9 liters.  It was also agitated to ensure any loose pieces were removed.  The gutters were inspected and revealed no loose bodies.  After copious irrigation, all instruments were removed.      The incisions were closed using 3-0 nylon in a figure-of-eight fashion.  A sterile ankle-to-groin dressing was then placed.    The sterile field was broken down, and the patient positioned for the irrigation and debridement of the arm.      ------------------------------------------------------    A well padded tourniquet was placed high on the left arm.  The left arm was then sterilely prepped and draped in the standard fashion.    The timeout was then performed identifying the patient as Georges Daniels and the consented procedure as irrigation and debridement of the left hand.   It was confirmed that the left hand were indeed the prepped and draped extremity.  It was confirmed that the patient received perioperative antibiotics.    An incision was made along the subcutaneous border of the ulna.  The skin flap was raised and retracted.  Cultures were then taken.   There was significant inflammation and purulence noted around the hypertrophic callous.   All non-viable subcutaneous tissues were sharply excised.  The wound was again irrigated. Tissue cultures were taken of bone and soft tissues.  The bone was curetted until it appeared healthy.  Bipolar cautery was used for hemostasis.   Once hemostasis was obtained, the wound was again irrigated. Soft tissues were excised until they appeared healthy including subcutaneous tissues, fascia, muscles and bone.  The wound was then copiously irrigated with the pulse lavage .  The deep tissues were closed using 0 PDS.  Subcutaneous tissues were closed using 2'0 monocryl.  Skin was closed using 3'0 prolene.  Xeroform and four by fours were placed over the incision.  A volar fiberglass shortarm splint was then applied.    ----------------------------------------------    The patient was awakened from anesthesia.   He was transferred to the [place].   He was taken to the Recovery Room in satisfactory condition.       POSTOPERATIVE PLAN  WBAT Left leg  WBAT via plateform walker left arm  Maintain dressing on left leg and splint on left arm until changed by orthopedics  PT eval/treat  DVT prophylaxis x6 weeks  Will plan on repeat irrigation and debridement of the forearm on Friday.    Will follow the  knee clinically for the need for more surgery.

## 2024-09-26 NOTE — PLAN OF CARE
"PT EVAL complete. Required MOD A for bed mobility, ambulated 4 steps with MOD A. Pt refused use of platform RW reporting "I won't be able to use this once I leave." Recommending high intensity therapy upon d/c.  "

## 2024-09-26 NOTE — ASSESSMENT & PLAN NOTE
Hyponatremia is likely due to Dehydration/hypovolemia. The patient's most recent sodium results are listed below.  Recent Labs     09/25/24  0858 09/26/24  0602   * 132*       Plan  - Correct the sodium by 4-6mEq in 24 hours.   - Will treat the hyponatremia with IV fluids as follows: s/p NS at 75/hr   - Monitor sodium Daily.   - Patient hyponatremia is  improving   - likely hypovolemic in setting of sepsis   - stop fluids

## 2024-09-26 NOTE — HOSPITAL COURSE
"The patient underwent a washout of the left knee and left forearm on September 25, 2024, performed by Dr. Lamar with ortho. Blood cultures drawn at admission were positive for Staphylococcus aureus in both sets, indicating a likely source of infection from a skin and soft tissue infection. He remained on IV Vancomycin and Cefepime; however, upon consultation with Infectious Disease (ID), the decision was made to discontinue Cefepime after blood cultures revealed MRSA.    A repeat incision and drainage of the left upper extremity was conducted on September 27 by Dr. Lamar, with plans for an additional I&D on September 30. ID recommended a four-week course of IV Vancomycin, with an expected end of treatment date of October 29, 2024. Due to homelessness and intravenous drug use, the patient was deemed unsuitable for home IV infusion therapy and a consult with Social Work was initiated to begin the process of LTAC placement for the administration of long-term IV antibiotics.    On September 30, the orthopedic team performed another surgical intervention, primarily focusing on the left arm. It was determined that no further intervention was necessary for the arm; however, the left knee was subjected to arthroscopy with lavage and drainage, and fluid was collected for cultures. The patient was clinically accepted by an LTAC facility, and it was recommended to hold the transfer for approximately 48 hours to ensure he did not require any additional surgical interventions.    As of October 3, 2024, the patient was reported to be doing well. PICC line was ordered early in the morning, and insurance approval for LTAC placement was secured. Discharge orders were placed pending the insertion of the PICC line.    /74 (Patient Position: Lying)   Pulse 97   Temp 98.6 °F (37 °C) (Oral)   Resp 16   Ht 6' 1" (1.854 m)   Wt 67.6 kg (149 lb 0.5 oz)   SpO2 98%   BMI 19.66 kg/m²     "

## 2024-09-26 NOTE — ASSESSMENT & PLAN NOTE
- MRI concerning for abscess, osteomyelitis and cellulitis of L arm   - orthopedics consulted; s/p washout 09/25/2024 with Dr. Cintron   - discussed with Dr. Lamar- pt will likely need repeat washout in coming days  - prelim op culture with rare GPC on gram stain   - continue IV Antibiotics as above    - ID consult for abx recommendations

## 2024-09-26 NOTE — PT/OT/SLP EVAL
"Physical Therapy Evaluation and Treatment    Patient Name: Georges Daniels   MRN: [id number]  Recent Surgery: Procedure(s) (LRB):  ARTHROSCOPY, KNEE, WITH DEBRIDEMENT (Left)  IRRIGATION AND DEBRIDEMENT, UPPER EXTREMITY (Left)  CHONDROPLASTY, KNEE (Left) 1 Day Post-Op    Recommendations:     Discharge Recommendations: High Intensity Therapy   Discharge Equipment Recommendations: to be determined by next level of care   Barriers to discharge:  pt is homeless    Assessment:     Georges Daniels is a 38 y.o. male admitted with a medical diagnosis of Septic arthritis of knee, left. He presents with the following impairments/functional limitations: weakness, impaired endurance, impaired functional mobility, gait instability, impaired balance, pain, decreased safety awareness, decreased lower extremity function, decreased ROM, orthopedic precautions.    Patient presents with good participation and motivation to return to prior level of function with high intensity therapy. The patient demonstrates appropriate endurance, strength, and pain control required to participate in up to 3 hours of combined therapy per day.     Rehab Prognosis: Good; patient would benefit from acute PT services to address these deficits and reach maximum level of function.    Plan:     During this hospitalization, patient to be seen 3 x/week to address the above listed problems via gait training, therapeutic activities, therapeutic exercises    Plan of Care Expires: 10/10/24    Subjective     Chief Complaint: Pt agreed to participate. Refused use of platform RW reporting, "I won't be able to use this once I leave, I sleep outside."  Patient Comments/Goals: none stated  Pain/Comfort:  Pain Rating 1: 8/10  Location - Side 1: Bilateral  Location - Orientation 1: generalized  Location 1: back  Pain Addressed 1: Reposition, Distraction  Pain Rating Post-Intervention 1: 8/10  Pain Rating 2: 8/10  Location - Side 2: Left  Location - " Orientation 2: generalized  Location 2: leg (and arm)  Pain Addressed 2: Reposition, Distraction  Pain Rating Post-Intervention 2: 8/10    Social History:  Living Environment: Patient is homeless.  Prior Level of Function: Prior to admission, patient was independent and ambulated household and community distances using no AD, able to ride his bike.  Equipment Used at Home: none  DME owned (not currently used): none  Assistance Upon Discharge: friend(s)    Objective:     Communicated with nurse and epic chart review prior to session. Patient found right sidelying with peripheral IV, telemetry upon PT entry to room.    General Precautions: Standard, fall   Orthopedic Precautions: LLE weight bearing as tolerated, LUE weight bearing as tolerated (with platform RW)   Braces: N/A    Respiratory Status: Room air    Exams:  Cognition: Patient is oriented to Person, Place, Time, Situation  RLE ROM: WFL  RLE Strength:  Grossly 4-/5  LLE ROM:  WFL except limited at knee  LLE Strength:  NT due to surgery  Sensation:    -       Intact  Skin Integrity/Edema:     -       Skin integrity: Visible skin intact    Functional Mobility:  Gait belt applied - Yes  Pt educated on L LE WBAT and L UE WBAT with use of platform RW. Pt refused use of platform RW despite education on benefits and use for safety.  Bed Mobility  Rolling Right: contact guard assistance  Scooting: contact guard assistance  Supine to Sit: minimum assistance for LE management and trunk management  Transfers  Sit to Stand: moderate assistance with no AD  Gait  Patient ambulated 4 steps with no AD and moderate assistance. Patient demonstrates unsteady gait. No c/o dizziness or SOB. All lines remained intact throughout ambulation trail.  Unable to progress gait due to increasing pain with WB.  Balance  Sitting: contact guard assistance  Standing: moderate assistance    Therapeutic Activities and Exercises:   Pt educated on role of PT in acute care and POC. Pt educated on  "L LE WBAT and L UE WBAT with use of platform RW. Educated on importance of OOB activities, activity pacing, and HEP (marching/hip flex, hip abd, heel slides/LAQ, quad sets, ankle pumps) in order to maintain/regain strength. Encouraged to sit up in chair for all meals. Educated on use of platform RW for safety and to reduce risk of falling. Educated on "call don't fall" policy and increased risk of falling due to weakness, instructed to utilize call bell for assistance with all transfers. Pt agreeable to all requests.    AM-PAC 6 CLICK MOBILITY  Total Score:13    Patient left supine with all lines intact, call button in reach, and friend present.    GOALS:   Multidisciplinary Problems       Physical Therapy Goals          Problem: Physical Therapy    Goal Priority Disciplines Outcome Goal Variances Interventions   Physical Therapy Goal     PT, PT/OT      Description: Goals to be met by 10/10/24.  1. Pt will complete bed mobility CGA.  2. Pt will complete sit to stand MIN A.  3. Pt will ambulate 20ft MIN A with platform RW.  4. Pt will increase AMPAC score by 2 points to progress functional mobility.                       History:     Past Medical History:   Diagnosis Date    Medical history non-contributory        Past Surgical History:   Procedure Laterality Date    CHONDROPLASTY OF KNEE Left 9/25/2024    Procedure: CHONDROPLASTY, KNEE;  Surgeon: Morena Lamar DO;  Location: [facility] OR;  Service: Orthopedics;  Laterality: Left;    IRRIGATION AND DEBRIDEMENT OF UPPER EXTREMITY Left 9/25/2024    Procedure: IRRIGATION AND DEBRIDEMENT, UPPER EXTREMITY;  Surgeon: Morena Lamar DO;  Location: [facility] OR;  Service: Orthopedics;  Laterality: Left;    KNEE ARTHROSCOPY W/ DEBRIDEMENT Left 9/25/2024    Procedure: ARTHROSCOPY, KNEE, WITH DEBRIDEMENT;  Surgeon: Morena aLmar DO;  Location: [facility] OR;  Service: Orthopedics;  Laterality: Left;    NO PAST SURGERIES         Time Tracking:     PT Received On: 09/26/24  PT " Start Time: 0840  PT Stop Time: 0905  PT Total Time (min): 25 min     Billable Minutes: Evaluation 15min and Therapeutic Activity 10min    9/26/2024

## 2024-09-26 NOTE — ASSESSMENT & PLAN NOTE
- s/p aspiration in the ED with 25283 WBC, 89% polys  - orthopedics consulted- s/p washout of L knee 09/25 with Dr. Lamar   - Continue empiric IV Vanc + Cefepime; vanc dosing and monitoring per pharmacy  - operative cultures pending   - ID consult for abx recommendations   - pain control with PO Roxicodone and IV mOrphine as needed  - PT/OT

## 2024-09-26 NOTE — BRIEF OP NOTE
[facility] - Surgery (Hospital)  Brief Operative Note    SUMMARY     Surgery Date: 9/25/2024     Surgeons and Role:     * Morena Lamar, DO - Primary    Assisting Surgeon: None    Pre-op Diagnosis:  Arthritis of left knee due to other bacteria [M00.862]    Post-op Diagnosis:  Post-Op Diagnosis Codes:     * Arthritis of left knee due to other bacteria [M00.862]    Procedure(s) (LRB):  ARTHROSCOPY, KNEE, WITH DEBRIDEMENT (Left)  IRRIGATION AND DEBRIDEMENT, UPPER EXTREMITY (Left)  CHONDROPLASTY, KNEE (Left)    Anesthesia: General    Implants:  * No implants in log *    Operative Findings:   LEFT knee had another 60cc of cloudy yellow fluid and was significantly inflamed  LEFT forearm had purulence and tissue that looked like mucous    Estimated Blood Loss: * No values recorded between 9/25/2024  4:52 PM and 9/25/2024  7:00 PM *    Estimated Blood Loss has not been documented. EBL = 5mlfor knee and 15ml for arm.       Tourniquet Time ARM only 32min       No tourniquet use d   Specimens:   Specimen (24h ago, onward)       Start     Ordered    09/25/24 1833  Specimen to Pathology, Surgery Orthopedics  Once        Comments: Pre-op Diagnosis: Arthritis of left knee due to other bacteria [M00.862]Procedure(s):ARTHROSCOPY, KNEE, WITH DEBRIDEMENTIRRIGATION AND DEBRIDEMENT, UPPER EXTREMITY Number of specimens: 1Name of specimens: 1. Dark spot on bone from non union-perm     References:    Click here for ordering Quick Tip   Question Answer Comment   Procedure Type: Orthopedics    Specimen Class: Routine/Screening    Release to patient Immediate        09/25/24 1843                    [id number]

## 2024-09-26 NOTE — PROGRESS NOTES
Pharmacokinetic Assessment Follow Up: IV Vancomycin    Vancomycin serum concentration assessment(s):    The random level was drawn correctly and can be used to guide therapy at this time. The measurement is below the desired definitive target range of 15 to 20 mcg/mL.    Vancomycin Regimen Plan:    Change regimen to Vancomycin 1000 mg IV every 12 hours with next serum trough concentration measured at 1500 prior to 4th dose on 09/27    Drug levels (last 3 results):  Recent Labs   Lab Result Units 09/26/24  0208   Vancomycin, Random ug/mL 9.1       Pharmacy will continue to follow and monitor vancomycin.    Please contact pharmacy at extension [phone] for questions regarding this assessment.    Thank you for the consult,   Elvira Graves       Patient brief summary:  Georges Daniels is a 38 y.o. male initiated on antimicrobial therapy with IV Vancomycin for treatment of bone/joint infection    The patient's current regimen is vancomycin 1000 mg IV every 12 hours.     Drug Allergies:   Review of patient's allergies indicates:  No Known Allergies    Actual Body Weight:   66.6 kg    Renal Function:   Estimated Creatinine Clearance: 132.1 mL/min (based on SCr of 0.7 mg/dL).,     Dialysis Method (if applicable):  N/A    CBC (last 72 hours):  Recent Labs   Lab Result Units 09/25/24  0858   WBC K/uL 15.58*   Hemoglobin g/dL 9.6*   Hematocrit % 28.8*   Platelets K/uL 298   Gran % % 84.7*   Lymph % % 7.6*   Mono % % 6.5   Eosinophil % % 0.1   Basophil % % 0.4   Differential Method  Automated       Metabolic Panel (last 72 hours):  Recent Labs   Lab Result Units 09/25/24  0858 09/25/24  0913   Sodium mmol/L 129*  --    Potassium mmol/L 3.4*  --    Chloride mmol/L 96  --    CO2 mmol/L 24  --    Glucose mg/dL 112*  --    Glucose, UA   --  Negative   BUN mg/dL 9  --    Creatinine mg/dL 0.7  --    Creatinine, Urine mg/dL  --  77.6   Albumin g/dL 2.3*  --    Total Bilirubin mg/dL 1.1*  --    Alkaline Phosphatase U/L 101  --     AST U/L 22  --    ALT U/L 15  --    Magnesium mg/dL 2.0  2.0  --    Phosphorus mg/dL 2.0*  --        Vancomycin Administrations:  vancomycin given in the last 96 hours                     vancomycin 1.75 g in 5 % dextrose 500 mL IVPB (mg) 1,750 mg New Bag 09/25/24 1352                    Microbiologic Results:  Microbiology Results (last 7 days)       Procedure Component Value Units Date/Time    AFB Culture & Smear [[id number]] Collected: 09/25/24 1847    Order Status: Sent Specimen: Tissue from Arm, Left Updated: 09/26/24 0155    Aerobic culture [[id number]] Collected: 09/25/24 1847    Order Status: Sent Specimen: Wound from Arm, Left Updated: 09/26/24 0155    AFB Culture & Smear [[id number]] Collected: 09/25/24 1847    Order Status: Sent Specimen: Tissue from Arm, Left Updated: 09/26/24 0155    Aerobic culture [[id number]] Collected: 09/25/24 1847    Order Status: Sent Specimen: Wound from Arm, Left Updated: 09/26/24 0155    Gram stain [[id number]] Collected: 09/25/24 1847    Order Status: Sent Specimen: Tissue from Arm, Left Updated: 09/26/24 0155    Fungus culture [[id number]] Collected: 09/25/24 1847    Order Status: Sent Specimen: Tissue from Arm, Left Updated: 09/26/24 0155    Culture, Anaerobe [[id number]] Collected: 09/25/24 1847    Order Status: Sent Specimen: Tissue from Arm, Left Updated: 09/26/24 0155    Gram stain [[id number]] Collected: 09/25/24 1847    Order Status: Sent Specimen: Tissue from Arm, Left Updated: 09/26/24 0155    Fungus culture [[id number]] Collected: 09/25/24 1847    Order Status: Sent Specimen: Tissue from Arm, Left Updated: 09/26/24 0155    Aerobic culture [[id number]] Collected: 09/25/24 1746    Order Status: Sent Specimen: Wound from Knee, Left Updated: 09/26/24 0155    Culture, Anaerobe [[id number]] Collected: 09/25/24 1803    Order Status: Sent Specimen: Wound from Arm, Left Updated: 09/26/24 0155    AFB Culture & Smear [[id number]] Collected: 09/25/24 3926    Order Status:  Sent Specimen: Wound from Arm, Left Updated: 09/26/24 0155    Aerobic culture [[id number]] Collected: 09/25/24 1812    Order Status: Sent Specimen: Wound from Arm, Left Updated: 09/26/24 0155    Gram stain [[id number]] Collected: 09/25/24 1812    Order Status: Sent Specimen: Wound from Arm, Left Updated: 09/26/24 0155    Culture, Anaerobe [[id number]] Collected: 09/25/24 1812    Order Status: Sent Specimen: Wound from Arm, Left Updated: 09/26/24 0155    Culture, Anaerobe [[id number]] Collected: 09/25/24 1847    Order Status: Sent Specimen: Tissue from Arm, Left Updated: 09/26/24 0155    Culture, Anaerobe [[id number]] Collected: 09/25/24 1746    Order Status: Sent Specimen: Joint Fluid from Knee, Left Updated: 09/26/24 0155    AFB Culture & Smear [[id number]] Collected: 09/25/24 1746    Order Status: Sent Specimen: Joint Fluid from Knee, Left Updated: 09/26/24 0155    Gram stain [[id number]] Collected: 09/25/24 1746    Order Status: Sent Specimen: Joint Fluid from Knee, Left Updated: 09/26/24 0155    Fungus culture [[id number]] Collected: 09/25/24 1746    Order Status: Sent Specimen: Joint Fluid from Knee, Left Updated: 09/26/24 0155    Aerobic culture [[id number]] Collected: 09/25/24 1803    Order Status: Sent Specimen: Wound from Arm, Left Updated: 09/26/24 0155    Gram stain [[id number]] Collected: 09/25/24 1803    Order Status: Sent Specimen: Wound from Arm, Left Updated: 09/26/24 0155    Fungus culture [[id number]] Collected: 09/25/24 1803    Order Status: Sent Specimen: Wound from Arm, Left Updated: 09/26/24 0155    Culture, Anaerobe [[id number]] Collected: 09/25/24 1812    Order Status: Sent Specimen: Wound from Arm, Left Updated: 09/26/24 0155    AFB Culture & Smear [[id number]] Collected: 09/25/24 1812    Order Status: Sent Specimen: Wound from Arm, Left Updated: 09/26/24 0155    Fungus culture [[id number]] Collected: 09/25/24 1812    Order Status: Sent Specimen: Wound from Arm, Left Updated: 09/26/24 0155     Aerobic culture [[id number]] Collected: 09/25/24 1812    Order Status: Sent Specimen: Wound from Arm, Left Updated: 09/26/24 0155    AFB Culture & Smear [[id number]] Collected: 09/25/24 1812    Order Status: Sent Specimen: Wound from Arm, Left Updated: 09/26/24 0155    Gram stain [[id number]] Collected: 09/25/24 1812    Order Status: Sent Specimen: Wound from Arm, Left Updated: 09/26/24 0155    Fungus culture [[id number]] Collected: 09/25/24 1812    Order Status: Sent Specimen: Wound from Arm, Left Updated: 09/26/24 0155    Blood culture x two cultures. Draw prior to antibiotics. [[id number]] Collected: 09/25/24 0901    Order Status: Completed Specimen: Blood from Peripheral, Forearm, Right Updated: 09/26/24 0115     Blood Culture, Routine No Growth to date    Narrative:      Aerobic and anaerobic    Blood culture x two cultures. Draw prior to antibiotics. [[id number]] Collected: 09/25/24 0903    Order Status: Completed Specimen: Blood from Peripheral, Antecubital, Left Updated: 09/26/24 0115     Blood Culture, Routine No Growth to date    Narrative:      Aerobic and anaerobic    Fungus culture [[id number]] Collected: 09/25/24 1009    Order Status: Sent Specimen: Joint Fluid from Knee, Left Updated: 09/25/24 2138    Culture, Body Fluid - Bactec [[id number]] Collected: 09/25/24 1009    Order Status: Sent Specimen: Joint Fluid from Knee, Left Updated: 09/25/24 2138    AFB Culture & Smear [[id number]] Collected: 09/25/24 1009    Order Status: Sent Specimen: Joint Fluid from Knee, Left Updated: 09/25/24 2138    Culture, Anaerobe [[id number]] Collected: 09/25/24 1813    Order Status: Sent Specimen: Wound from Arm, Left Updated: 09/25/24 1813    AFB Culture & Smear [[id number]] Collected: 09/25/24 1813    Order Status: Sent Specimen: Wound from Arm, Left Updated: 09/25/24 1813    Gram stain [[id number]] Collected: 09/25/24 1813    Order Status: Sent Specimen: Wound from Arm, Left Updated: 09/25/24 1813    Fungus  culture [[id number]] Collected: 09/25/24 1813    Order Status: Sent Specimen: Wound from Arm, Left Updated: 09/25/24 1813    Aerobic culture [[id number]] Collected: 09/25/24 1813    Order Status: Sent Specimen: Wound from Arm, Left Updated: 09/25/24 1813    Gram stain [[id number]] Collected: 09/25/24 1009    Order Status: Completed Specimen: Joint Fluid from Knee, Left Updated: 09/25/24 1133     Gram Stain Result Many WBC's      No epithelial cells      No organisms seen    Influenza A & B by Molecular [[id number]] Collected: 09/25/24 0938    Order Status: Completed Specimen: Nasopharyngeal Swab Updated: 09/25/24 1012     Influenza A, Molecular Negative     Influenza B, Molecular Negative     Flu A & B Source Nasal swab

## 2024-09-26 NOTE — PROGRESS NOTES
1 Day Post-Op       SUBJECTIVE:   Patient reports:  Knee some better as is forearm if it is left alone.    He did walk to the bathroom.      Interval notes reviewed.       Physical Exam:   Vital Signs   Wt Readings from Last 1 Encounters:   09/25/24 66.6 kg (146 lb 13.2 oz)     Temp Readings from Last 1 Encounters:   09/26/24 98.8 °F (37.1 °C) (Oral)     BP Readings from Last 1 Encounters:   09/26/24 (!) 98/54     Pulse Readings from Last 1 Encounters:   09/26/24 89       Body mass index is 19.37 kg/m².    General Appearance:   NAD, ill appearing, cooperative    Neurologic:  Alert and oriented x3    Pysch:  Age appropriate    STATION:   Right lateral decubitus in bed    Musculoskeletal:     Left knee:  Dressing in place with no drainage.  No erythema.   Swelling mild.  AROM 20-90.. Distal neurovascular status intact.     Left forearm  Dressing/splint in placed.  Radial/ulnar/median nerves intact motor/sensory.  Tender over surgical site.  Good elbow ROM.  Cap refill <2s.                    LABS:   Hemoglobin   Date/Time Value Ref Range Status   09/26/2024 06:02 AM 8.6 (L) 14.0 - 18.0 g/dL Final     Hematocrit   Date/Time Value Ref Range Status   09/26/2024 06:02 AM 26.4 (L) 40.0 - 54.0 % Final       Sodium   Date/Time Value Ref Range Status   09/26/2024 06:02  (L) 136 - 145 mmol/L Final     Potassium   Date/Time Value Ref Range Status   09/26/2024 06:02 AM 3.6 3.5 - 5.1 mmol/L Final     Glucose   Date/Time Value Ref Range Status   09/26/2024 06:02  (H) 70 - 110 mg/dL Final     All cultures/gram stains reviewed.    Assessment:       LEFT knee septic arthritis, s/p arthroscopic I/D  Left ulna infected non-union, s/p Irrigation/debridement including of fracture nonunion  Sepsis  Hepatitis C  Homelessness  H/o IVDA          DISCUSSION:   Patient's symptoms, imaging, diagnosis and prognosis reviewed and discussed.  Discussed healing progression. Case with attending hospitalist.  Will speak with   Nnadi    Patient given an opportunity to ask questions.  When his questions, were answered, the following plan was adopted.      Plan:          Weight bearing:    Left knee As Tolerated   Left arm via platform  Ice left knee and arm as needed for pain     Patient encourged to work on full knee extension while awake  Ortho Trauma to follow             Morena Lamar DO, CAJEFF, [facility]  Orthopaedic Surgeon

## 2024-09-26 NOTE — ASSESSMENT & PLAN NOTE
Prior hx of IVDA, UDS on admission + for methamphetamines   Complicating management of above conditions   Will need LTAC placement for IV abx on discharge

## 2024-09-26 NOTE — NURSING TRANSFER
Nursing Transfer Note      9/25/2024   8:59 PM    Nurse giving handoff: Dorothy  Nurse receiving handoff:Karrie      Reason patient is being transferred: IV ABX post I&D    Transfer From: PACU    Transfer via bed    Transfer with Pt belongings    Transported by Dorothy/PACU    Transfer Vital Signs:  Blood Pressure:129/80  Heart Rate:75  O2:98%  Temperature:98.3  Respirations:15    Telemetry: Box Number [id number]  Order for Tele Monitor? Yes    Additional Lines: none    4eyes on Skin: yes    Medicines sent: none    Any special needs or follow-up needed: none    Patient belongings transferred with patient: Yes    Chart send with patient: Yes    Notified: pt notified    Patient reassessed at: 09/25/24 @ 2130 (date, time)  1  Upon arrival to floor: cardiac monitor applied, patient oriented to room, call bell in reach, and bed in lowest position

## 2024-09-26 NOTE — SUBJECTIVE & OBJECTIVE
Interval History: NAEON. Pt seen and examined with RN at bedside. Tmax 100.7. Pt reports pain to L arm and knee, somewhat improved since yesterday. Reports tingling to fingers and toes, SILT. Blood cultures +, repeat cultures ordered.    Review of Systems  Objective:     Vital Signs (Most Recent):  Temp: 98.8 °F (37.1 °C) (09/26/24 1159)  Pulse: 89 (09/26/24 1159)  Resp: 16 (09/26/24 1159)  BP: (!) 98/54 (09/26/24 1159)  SpO2: 100 % (09/26/24 1159) Vital Signs (24h Range):  Temp:  [97.4 °F (36.3 °C)-100.7 °F (38.2 °C)] 98.8 °F (37.1 °C)  Pulse:  [] 89  Resp:  [10-21] 16  SpO2:  [96 %-100 %] 100 %  BP: ()/(54-90) 98/54     Weight: 66.6 kg (146 lb 13.2 oz)  Body mass index is 19.37 kg/m².    Intake/Output Summary (Last 24 hours) at 9/26/2024 1436  Last data filed at 9/26/2024 0830  Gross per 24 hour   Intake 2893.64 ml   Output 650 ml   Net 2243.64 ml         Physical Exam  Vitals and nursing note reviewed.   Constitutional:       General: He is not in acute distress.     Appearance: He is ill-appearing.   Cardiovascular:      Rate and Rhythm: Regular rhythm. Tachycardia present.      Heart sounds: No murmur heard.  Pulmonary:      Effort: Pulmonary effort is normal.      Breath sounds: Normal breath sounds. No wheezing, rhonchi or rales.   Abdominal:      General: Bowel sounds are normal. There is no distension.      Palpations: Abdomen is soft.      Tenderness: There is no abdominal tenderness.   Musculoskeletal:      Comments: LUE and LLE with ace wrap in place, able to move fingers and toes, SILT    Skin:     Comments: Pustules and excoriations to BLE    Neurological:      Mental Status: He is alert.      Comments: Tremulous              Significant Labs: All pertinent labs within the past 24 hours have been reviewed.    Significant Imaging: I have reviewed all pertinent imaging results/findings within the past 24 hours.

## 2024-09-26 NOTE — ASSESSMENT & PLAN NOTE
- likely skin/soft tissue source  - repeat blood cultures in AM   - obtain TTE to r/o vegetation   - ID consult for abx recommendations

## 2024-09-26 NOTE — TRANSFER OF CARE
"Anesthesia Transfer of Care Note    Patient: Georges Daniels    Procedure(s) Performed: Procedure(s) (LRB):  ARTHROSCOPY, KNEE, WITH DEBRIDEMENT (Left)  IRRIGATION AND DEBRIDEMENT, UPPER EXTREMITY (Left)  CHONDROPLASTY, KNEE (Left)    Patient location: PACU    Anesthesia Type: general    Transport from OR: Transported from OR on room air with adequate spontaneous ventilation    Post pain: adequate analgesia    Post assessment: no apparent anesthetic complications    Post vital signs: stable    Level of consciousness: responds to stimulation and awake    Nausea/Vomiting: no nausea/vomiting    Complications: none    Transfer of care protocol was followed      Last vitals: Visit Vitals  BP (!) 131/90   Pulse 62   Temp 36.4 °C (97.5 °F) (Oral)   Resp 19   Ht 6' 1" (1.854 m)   Wt 66.6 kg (146 lb 13.2 oz)   SpO2 100%   BMI 19.37 kg/m²     "

## 2024-09-26 NOTE — ASSESSMENT & PLAN NOTE
"This patient does have evidence of infective focus  My overall impression is sepsis.  Source: Skin and Soft Tissue (location L arm and L knee )  Antibiotics given-   Antibiotics (72h ago, onward)    Start     Stop Route Frequency Ordered    09/26/24 0400  vancomycin (VANCOCIN) 1,000 mg in D5W 250 mL IVPB (admixture device)         -- IV Every 12 hours (non-standard times) 09/26/24 0306    09/25/24 1815  ceFEPIme (MAXIPIME) 2 g in D5W 100 mL IVPB (MB+)         -- IV Every 8 hours (non-standard times) 09/25/24 1144    09/25/24 1013  vancomycin - pharmacy to dose  (vancomycin IVPB (PEDS and ADULTS))        Placed in "And" Linked Group    -- IV pharmacy to manage frequency 09/25/24 1014        Latest lactate reviewed-  Recent Labs   Lab 09/25/24  1310   LACTATE 0.8       Organ dysfunction indicated by  None     Fluid challenge s/p sepsis fluids in the Ed      Post- resuscitation assessment No - Post resuscitation assessment not needed       Will Not start Pressors   Source control achieved by: IV antibiotics, surgical washout   "

## 2024-09-26 NOTE — ANESTHESIA POSTPROCEDURE EVALUATION
Anesthesia Post Evaluation    Patient: Georges Daniels    Procedure(s) Performed: Procedure(s) (LRB):  ARTHROSCOPY, KNEE, WITH DEBRIDEMENT (Left)  IRRIGATION AND DEBRIDEMENT, UPPER EXTREMITY (Left)  CHONDROPLASTY, KNEE (Left)    Final Anesthesia Type: general      Patient location during evaluation: PACU  Patient participation: Yes- Able to Participate  Level of consciousness: awake and alert and oriented  Post-procedure vital signs: reviewed and stable  Pain management: adequate  Airway patency: patent    PONV status at discharge: No PONV  Anesthetic complications: no      Cardiovascular status: blood pressure returned to baseline, stable and hemodynamically stable  Respiratory status: unassisted  Hydration status: euvolemic  Follow-up not needed.              Vitals Value Taken Time   /76 09/25/24 1920   Temp 36.3 °C (97.4 °F) 09/25/24 1902   Pulse 68 09/25/24 1929   Resp 35 09/25/24 1929   SpO2 98 % 09/25/24 1929   Vitals shown include unfiled device data.      No case tracking events are documented in the log.      Pain/Adis Score: Pain Rating Prior to Med Admin: 7 (9/25/2024 11:01 AM)  Pain Rating Post Med Admin: 6 (9/25/2024 10:20 AM)

## 2024-09-27 ENCOUNTER — ANESTHESIA EVENT (OUTPATIENT)
Dept: SURGERY | Facility: HOSPITAL | Age: 39
End: 2024-09-27
Payer: MEDICAID

## 2024-09-27 ENCOUNTER — ANESTHESIA (OUTPATIENT)
Dept: SURGERY | Facility: HOSPITAL | Age: 39
End: 2024-09-27
Payer: MEDICAID

## 2024-09-27 LAB
ACID FAST MOD KINY STN SPEC: NORMAL
ANION GAP SERPL CALC-SCNC: 5 MMOL/L (ref 8–16)
BASOPHILS # BLD AUTO: 0.02 K/UL (ref 0–0.2)
BASOPHILS NFR BLD: 0.2 % (ref 0–1.9)
BUN SERPL-MCNC: 6 MG/DL (ref 6–20)
CALCIUM SERPL-MCNC: 8.4 MG/DL (ref 8.7–10.5)
CHLORIDE SERPL-SCNC: 103 MMOL/L (ref 95–110)
CO2 SERPL-SCNC: 25 MMOL/L (ref 23–29)
CREAT SERPL-MCNC: 0.6 MG/DL (ref 0.5–1.4)
DIFFERENTIAL METHOD BLD: ABNORMAL
EOSINOPHIL # BLD AUTO: 0.1 K/UL (ref 0–0.5)
EOSINOPHIL NFR BLD: 0.5 % (ref 0–8)
ERYTHROCYTE [DISTWIDTH] IN BLOOD BY AUTOMATED COUNT: 13.6 % (ref 11.5–14.5)
EST. GFR  (NO RACE VARIABLE): >60 ML/MIN/1.73 M^2
GIANT PLATELETS BLD QL SMEAR: PRESENT
GLUCOSE SERPL-MCNC: 101 MG/DL (ref 70–110)
GRAM STN SPEC: NORMAL
GRAM STN SPEC: NORMAL
HCT VFR BLD AUTO: 29.9 % (ref 40–54)
HGB BLD-MCNC: 9.7 G/DL (ref 14–18)
IMM GRANULOCYTES # BLD AUTO: 0.05 K/UL (ref 0–0.04)
IMM GRANULOCYTES NFR BLD AUTO: 0.5 % (ref 0–0.5)
LYMPHOCYTES # BLD AUTO: 1.3 K/UL (ref 1–4.8)
LYMPHOCYTES NFR BLD: 13.7 % (ref 18–48)
MCH RBC QN AUTO: 27.8 PG (ref 27–31)
MCHC RBC AUTO-ENTMCNC: 32.4 G/DL (ref 32–36)
MCV RBC AUTO: 86 FL (ref 82–98)
MONOCYTES # BLD AUTO: 0.8 K/UL (ref 0.3–1)
MONOCYTES NFR BLD: 8.2 % (ref 4–15)
MYCOBACTERIUM SPEC QL CULT: NORMAL
NEUTROPHILS # BLD AUTO: 7.4 K/UL (ref 1.8–7.7)
NEUTROPHILS NFR BLD: 76.9 % (ref 38–73)
NRBC BLD-RTO: 0 /100 WBC
PLATELET # BLD AUTO: 431 K/UL (ref 150–450)
PLATELET BLD QL SMEAR: ABNORMAL
PMV BLD AUTO: 11 FL (ref 9.2–12.9)
POTASSIUM SERPL-SCNC: 3.7 MMOL/L (ref 3.5–5.1)
RBC # BLD AUTO: 3.49 M/UL (ref 4.6–6.2)
SODIUM SERPL-SCNC: 133 MMOL/L (ref 136–145)
VANCOMYCIN TROUGH SERPL-MCNC: 10.9 UG/ML (ref 10–22)
WBC # BLD AUTO: 9.67 K/UL (ref 3.9–12.7)

## 2024-09-27 PROCEDURE — 63600175 PHARM REV CODE 636 W HCPCS: Performed by: NURSE ANESTHETIST, CERTIFIED REGISTERED

## 2024-09-27 PROCEDURE — 87040 BLOOD CULTURE FOR BACTERIA: CPT | Performed by: INTERNAL MEDICINE

## 2024-09-27 PROCEDURE — 25000003 PHARM REV CODE 250: Performed by: INTERNAL MEDICINE

## 2024-09-27 PROCEDURE — 27000207 HC ISOLATION

## 2024-09-27 PROCEDURE — 87070 CULTURE OTHR SPECIMN AEROBIC: CPT | Performed by: ORTHOPAEDIC SURGERY

## 2024-09-27 PROCEDURE — 36000707: Performed by: ORTHOPAEDIC SURGERY

## 2024-09-27 PROCEDURE — 63600175 PHARM REV CODE 636 W HCPCS: Performed by: INTERNAL MEDICINE

## 2024-09-27 PROCEDURE — 36415 COLL VENOUS BLD VENIPUNCTURE: CPT | Performed by: INTERNAL MEDICINE

## 2024-09-27 PROCEDURE — 0XBF0ZZ EXCISION OF LEFT LOWER ARM, OPEN APPROACH: ICD-10-PCS | Performed by: ORTHOPAEDIC SURGERY

## 2024-09-27 PROCEDURE — 71000033 HC RECOVERY, INTIAL HOUR: Performed by: ORTHOPAEDIC SURGERY

## 2024-09-27 PROCEDURE — 87077 CULTURE AEROBIC IDENTIFY: CPT | Performed by: ORTHOPAEDIC SURGERY

## 2024-09-27 PROCEDURE — 87176 TISSUE HOMOGENIZATION CULTR: CPT | Performed by: ORTHOPAEDIC SURGERY

## 2024-09-27 PROCEDURE — 63600175 PHARM REV CODE 636 W HCPCS: Performed by: ORTHOPAEDIC SURGERY

## 2024-09-27 PROCEDURE — 80202 ASSAY OF VANCOMYCIN: CPT | Performed by: INTERNAL MEDICINE

## 2024-09-27 PROCEDURE — 87102 FUNGUS ISOLATION CULTURE: CPT | Performed by: ORTHOPAEDIC SURGERY

## 2024-09-27 PROCEDURE — 36000706: Performed by: ORTHOPAEDIC SURGERY

## 2024-09-27 PROCEDURE — 87186 SC STD MICRODIL/AGAR DIL: CPT | Performed by: ORTHOPAEDIC SURGERY

## 2024-09-27 PROCEDURE — 87205 SMEAR GRAM STAIN: CPT | Mod: 59 | Performed by: ORTHOPAEDIC SURGERY

## 2024-09-27 PROCEDURE — 37000009 HC ANESTHESIA EA ADD 15 MINS: Performed by: ORTHOPAEDIC SURGERY

## 2024-09-27 PROCEDURE — 80048 BASIC METABOLIC PNL TOTAL CA: CPT | Performed by: ORTHOPAEDIC SURGERY

## 2024-09-27 PROCEDURE — 87116 MYCOBACTERIA CULTURE: CPT | Performed by: ORTHOPAEDIC SURGERY

## 2024-09-27 PROCEDURE — 11044 DBRDMT BONE 1ST 20 SQ CM/<: CPT | Mod: 79,,, | Performed by: ORTHOPAEDIC SURGERY

## 2024-09-27 PROCEDURE — 37000008 HC ANESTHESIA 1ST 15 MINUTES: Performed by: ORTHOPAEDIC SURGERY

## 2024-09-27 PROCEDURE — 87206 SMEAR FLUORESCENT/ACID STAI: CPT | Mod: 91 | Performed by: ORTHOPAEDIC SURGERY

## 2024-09-27 PROCEDURE — 25000003 PHARM REV CODE 250: Performed by: NURSE ANESTHETIST, CERTIFIED REGISTERED

## 2024-09-27 PROCEDURE — 85025 COMPLETE CBC W/AUTO DIFF WBC: CPT | Performed by: ORTHOPAEDIC SURGERY

## 2024-09-27 PROCEDURE — 21400001 HC TELEMETRY ROOM

## 2024-09-27 PROCEDURE — 99233 SBSQ HOSP IP/OBS HIGH 50: CPT | Mod: NSCH,,, | Performed by: INTERNAL MEDICINE

## 2024-09-27 PROCEDURE — 25000003 PHARM REV CODE 250: Performed by: ORTHOPAEDIC SURGERY

## 2024-09-27 PROCEDURE — 87075 CULTR BACTERIA EXCEPT BLOOD: CPT | Performed by: ORTHOPAEDIC SURGERY

## 2024-09-27 RX ORDER — FENTANYL CITRATE 50 UG/ML
INJECTION, SOLUTION INTRAMUSCULAR; INTRAVENOUS
Status: DISCONTINUED | OUTPATIENT
Start: 2024-09-27 | End: 2024-09-27

## 2024-09-27 RX ORDER — DEXAMETHASONE SODIUM PHOSPHATE 4 MG/ML
INJECTION, SOLUTION INTRA-ARTICULAR; INTRALESIONAL; INTRAMUSCULAR; INTRAVENOUS; SOFT TISSUE
Status: DISCONTINUED | OUTPATIENT
Start: 2024-09-27 | End: 2024-09-27

## 2024-09-27 RX ORDER — OXYCODONE AND ACETAMINOPHEN 5; 325 MG/1; MG/1
1 TABLET ORAL
Status: DISCONTINUED | OUTPATIENT
Start: 2024-09-27 | End: 2024-09-27 | Stop reason: HOSPADM

## 2024-09-27 RX ORDER — MIDAZOLAM HYDROCHLORIDE 1 MG/ML
INJECTION INTRAMUSCULAR; INTRAVENOUS
Status: DISCONTINUED | OUTPATIENT
Start: 2024-09-27 | End: 2024-09-27

## 2024-09-27 RX ORDER — ROCURONIUM BROMIDE 10 MG/ML
INJECTION, SOLUTION INTRAVENOUS
Status: DISCONTINUED | OUTPATIENT
Start: 2024-09-27 | End: 2024-09-27

## 2024-09-27 RX ORDER — ONDANSETRON HYDROCHLORIDE 2 MG/ML
INJECTION, SOLUTION INTRAVENOUS
Status: DISCONTINUED | OUTPATIENT
Start: 2024-09-27 | End: 2024-09-27

## 2024-09-27 RX ORDER — HEPARIN SODIUM 5000 [USP'U]/ML
5000 INJECTION, SOLUTION INTRAVENOUS; SUBCUTANEOUS EVERY 8 HOURS
Status: DISCONTINUED | OUTPATIENT
Start: 2024-09-27 | End: 2024-09-29

## 2024-09-27 RX ORDER — PROPOFOL 10 MG/ML
VIAL (ML) INTRAVENOUS
Status: DISCONTINUED | OUTPATIENT
Start: 2024-09-27 | End: 2024-09-27

## 2024-09-27 RX ORDER — KETOROLAC TROMETHAMINE 30 MG/ML
15 INJECTION, SOLUTION INTRAMUSCULAR; INTRAVENOUS EVERY 8 HOURS PRN
Status: DISCONTINUED | OUTPATIENT
Start: 2024-09-27 | End: 2024-09-27 | Stop reason: HOSPADM

## 2024-09-27 RX ORDER — SUCCINYLCHOLINE CHLORIDE 20 MG/ML
INJECTION INTRAMUSCULAR; INTRAVENOUS
Status: DISCONTINUED | OUTPATIENT
Start: 2024-09-27 | End: 2024-09-27

## 2024-09-27 RX ORDER — ONDANSETRON HYDROCHLORIDE 2 MG/ML
4 INJECTION, SOLUTION INTRAVENOUS DAILY PRN
Status: DISCONTINUED | OUTPATIENT
Start: 2024-09-27 | End: 2024-09-27 | Stop reason: HOSPADM

## 2024-09-27 RX ORDER — HYDROMORPHONE HYDROCHLORIDE 1 MG/ML
0.2 INJECTION, SOLUTION INTRAMUSCULAR; INTRAVENOUS; SUBCUTANEOUS EVERY 5 MIN PRN
Status: DISCONTINUED | OUTPATIENT
Start: 2024-09-27 | End: 2024-09-27 | Stop reason: HOSPADM

## 2024-09-27 RX ORDER — LIDOCAINE HYDROCHLORIDE 20 MG/ML
INJECTION INTRAVENOUS
Status: DISCONTINUED | OUTPATIENT
Start: 2024-09-27 | End: 2024-09-27

## 2024-09-27 RX ADMIN — SENNOSIDES AND DOCUSATE SODIUM 1 TABLET: 50; 8.6 TABLET ORAL at 09:09

## 2024-09-27 RX ADMIN — DEXAMETHASONE SODIUM PHOSPHATE 4 MG: 4 INJECTION, SOLUTION INTRA-ARTICULAR; INTRALESIONAL; INTRAMUSCULAR; INTRAVENOUS; SOFT TISSUE at 07:09

## 2024-09-27 RX ADMIN — MORPHINE SULFATE 2 MG: 4 INJECTION INTRAVENOUS at 09:09

## 2024-09-27 RX ADMIN — ONDANSETRON 4 MG: 2 INJECTION INTRAMUSCULAR; INTRAVENOUS at 07:09

## 2024-09-27 RX ADMIN — SODIUM CHLORIDE, SODIUM LACTATE, POTASSIUM CHLORIDE, AND CALCIUM CHLORIDE: .6; .31; .03; .02 INJECTION, SOLUTION INTRAVENOUS at 07:09

## 2024-09-27 RX ADMIN — MORPHINE SULFATE 2 MG: 4 INJECTION INTRAVENOUS at 02:09

## 2024-09-27 RX ADMIN — MIDAZOLAM HYDROCHLORIDE 2 MG: 1 INJECTION, SOLUTION INTRAMUSCULAR; INTRAVENOUS at 07:09

## 2024-09-27 RX ADMIN — VANCOMYCIN HYDROCHLORIDE 1000 MG: 1 INJECTION, POWDER, LYOPHILIZED, FOR SOLUTION INTRAVENOUS at 07:09

## 2024-09-27 RX ADMIN — LIDOCAINE HYDROCHLORIDE 50 MG: 20 INJECTION INTRAVENOUS at 07:09

## 2024-09-27 RX ADMIN — CEFEPIME 2 G: 2 INJECTION, POWDER, FOR SOLUTION INTRAVENOUS at 01:09

## 2024-09-27 RX ADMIN — VANCOMYCIN HYDROCHLORIDE 1500 MG: 1.5 INJECTION, POWDER, LYOPHILIZED, FOR SOLUTION INTRAVENOUS at 05:09

## 2024-09-27 RX ADMIN — FENTANYL CITRATE 50 MCG: 50 INJECTION, SOLUTION INTRAMUSCULAR; INTRAVENOUS at 08:09

## 2024-09-27 RX ADMIN — MELATONIN TAB 3 MG 6 MG: 3 TAB at 02:09

## 2024-09-27 RX ADMIN — FENTANYL CITRATE 50 MCG: 50 INJECTION, SOLUTION INTRAMUSCULAR; INTRAVENOUS at 07:09

## 2024-09-27 RX ADMIN — ROCURONIUM BROMIDE 5 MG: 10 INJECTION, SOLUTION INTRAVENOUS at 07:09

## 2024-09-27 RX ADMIN — HEPARIN SODIUM 5000 UNITS: 5000 INJECTION INTRAVENOUS; SUBCUTANEOUS at 02:09

## 2024-09-27 RX ADMIN — HEPARIN SODIUM 5000 UNITS: 5000 INJECTION INTRAVENOUS; SUBCUTANEOUS at 09:09

## 2024-09-27 RX ADMIN — VANCOMYCIN HYDROCHLORIDE 1000 MG: 1 INJECTION, POWDER, LYOPHILIZED, FOR SOLUTION INTRAVENOUS at 04:09

## 2024-09-27 RX ADMIN — PROPOFOL 150 MG: 10 INJECTION, EMULSION INTRAVENOUS at 07:09

## 2024-09-27 RX ADMIN — SUCCINYLCHOLINE CHLORIDE 120 MG: 20 INJECTION, SOLUTION INTRAMUSCULAR; INTRAVENOUS; PARENTERAL at 07:09

## 2024-09-27 NOTE — ASSESSMENT & PLAN NOTE
TTE -did not show vegetation  Isolate is MRSA- will stop Cefepime in AM  Continue Vanco  Will follow repeat blood culture in AM

## 2024-09-27 NOTE — ASSESSMENT & PLAN NOTE
"This patient does have evidence of infective focus  My overall impression is sepsis.  Source: Skin and Soft Tissue (location L arm and L knee )  Antibiotics given-   Antibiotics (72h ago, onward)    Start     Stop Route Frequency Ordered    09/26/24 0400  vancomycin (VANCOCIN) 1,000 mg in D5W 250 mL IVPB (admixture device)         -- IV Every 12 hours (non-standard times) 09/26/24 0306    09/25/24 1013  vancomycin - pharmacy to dose  (vancomycin IVPB (PEDS and ADULTS))        Placed in "And" Linked Group    -- IV pharmacy to manage frequency 09/25/24 1014        Latest lactate reviewed-  Recent Labs   Lab 09/25/24  1310   LACTATE 0.8       Organ dysfunction indicated by  None     Fluid challenge s/p sepsis fluids in the Ed      Post- resuscitation assessment No - Post resuscitation assessment not needed       Will Not start Pressors   Source control achieved by: IV antibiotics, surgical washout   "

## 2024-09-27 NOTE — PLAN OF CARE
"No complaints this shift. NPO, OR scheduled for 0700 this am. Bed in low & locked position with call light in reach. Will continue to monitor. Bed alarm on & audible. 24h cc complete.    /65   Pulse 89   Temp (!) 100.7 °F (38.2 °C)   Resp 16   Ht 6' 1" (1.854 m)   Wt 66.2 kg (146 lb)   SpO2 95%   BMI 19.26 kg/m²     "

## 2024-09-27 NOTE — PT/OT/SLP PROGRESS
Physical Therapy      Patient Name:  Georges Daniels   MRN:  [id number]    Chart review complete. Patient not seen secondary to Off the floor for procedure/surgery. Will continue efforts.    Ann Paige, PT, DPT  9/27/2024   0948

## 2024-09-27 NOTE — SUBJECTIVE & OBJECTIVE
"Past Medical History:   Diagnosis Date    Medical history non-contributory        Past Surgical History:   Procedure Laterality Date    CHONDROPLASTY OF KNEE Left 9/25/2024    Procedure: CHONDROPLASTY, KNEE;  Surgeon: Morena Lamar DO;  Location: [facility] OR;  Service: Orthopedics;  Laterality: Left;    IRRIGATION AND DEBRIDEMENT OF UPPER EXTREMITY Left 9/25/2024    Procedure: IRRIGATION AND DEBRIDEMENT, UPPER EXTREMITY;  Surgeon: Morena Lamar DO;  Location: [facility] OR;  Service: Orthopedics;  Laterality: Left;    KNEE ARTHROSCOPY W/ DEBRIDEMENT Left 9/25/2024    Procedure: ARTHROSCOPY, KNEE, WITH DEBRIDEMENT;  Surgeon: Morena Lamar DO;  Location: [facility] OR;  Service: Orthopedics;  Laterality: Left;    NO PAST SURGERIES         Review of patient's allergies indicates:  No Known Allergies    Medications:  No medications prior to admission.     Antibiotics (From admission, onward)      Start     Stop Route Frequency Ordered    09/26/24 0400  vancomycin (VANCOCIN) 1,000 mg in D5W 250 mL IVPB (admixture device)         -- IV Every 12 hours (non-standard times) 09/26/24 0306    09/25/24 1815  ceFEPIme (MAXIPIME) 2 g in D5W 100 mL IVPB (MB+)         -- IV Every 8 hours (non-standard times) 09/25/24 1144    09/25/24 1013  vancomycin - pharmacy to dose  (vancomycin IVPB (PEDS and ADULTS))        Placed in "And" Linked Group    -- IV pharmacy to manage frequency 09/25/24 1014          Antifungals (From admission, onward)      None          Antivirals (From admission, onward)      None               There is no immunization history on file for this patient.    Family History    None       Social History     Socioeconomic History    Marital status: Single   Tobacco Use    Smoking status: Every Day     Current packs/day: 1.00     Types: Cigarettes    Smokeless tobacco: Never   Substance and Sexual Activity    Alcohol use: No    Drug use: Not Currently    Sexual activity: Not Currently     Review of Systems "   Constitutional:  Negative for activity change, appetite change, chills, diaphoresis and fatigue.   Neurological:  Negative for dizziness and facial asymmetry.     Objective:     Vital Signs (Most Recent):  Temp: (!) 100.7 °F (38.2 °C) (09/27/24 0415)  Pulse: 89 (09/27/24 0505)  Resp: 16 (09/27/24 0415)  BP: 115/65 (09/27/24 0415)  SpO2: 95 % (09/27/24 0415) Vital Signs (24h Range):  Temp:  [98.7 °F (37.1 °C)-101 °F (38.3 °C)] 100.7 °F (38.2 °C)  Pulse:  [] 89  Resp:  [15-20] 16  SpO2:  [95 %-100 %] 95 %  BP: ()/(54-78) 115/65     Weight: 66.2 kg (146 lb)  Body mass index is 19.26 kg/m².    Estimated Creatinine Clearance: 153.2 mL/min (based on SCr of 0.6 mg/dL).     Physical Exam  Vitals and nursing note reviewed.   HENT:      Head: Normocephalic.   Cardiovascular:      Rate and Rhythm: Normal rate.   Pulmonary:      Effort: Pulmonary effort is normal.   Musculoskeletal:      Comments: Dressing noted   Skin:     Findings: Lesion present.   Neurological:      Mental Status: He is alert.        Significant Labs: Blood Culture:   Recent Labs   Lab 09/25/24  0901 09/25/24  0903   LABBLOO Gram stain aer bottle: Gram positive cocci in clusters resembling Staph  Gram stain altaf bottle: Gram positive cocci in clusters resembling Staph  Results called to and read back by: Marimar Valle RN 09/26/2024  06:27 Gram stain aer bottle: Gram positive cocci in clusters resembling Staph  Gram stain lataf bottle: Gram positive cocci in clusters resembling Staph  Results called to and read back by: Marimar Valle RN 09/26/2024  06:27     BMP:   Recent Labs   Lab 09/25/24  0858 09/26/24  0602 09/27/24  0500   *   < > 101   *   < > 133*   K 3.4*   < > 3.7   CL 96   < > 103   CO2 24   < > 25   BUN 9   < > 6   CREATININE 0.7   < > 0.6   CALCIUM 8.5*   < > 8.4*   MG 2.0  2.0  --   --     < > = values in this interval not displayed.     CBC:   Recent Labs   Lab 09/25/24  0858 09/26/24  0602 09/27/24  0500   WBC  15.58* 10.13 9.67   HGB 9.6* 8.6* 9.7*   HCT 28.8* 26.4* 29.9*    296 431     CMP:   Recent Labs   Lab 09/25/24  0858 09/26/24  0602 09/27/24  0500   * 132* 133*   K 3.4* 3.6 3.7   CL 96 103 103   CO2 24 22* 25   * 123* 101   BUN 9 11 6   CREATININE 0.7 0.7 0.6   CALCIUM 8.5* 7.8* 8.4*   PROT 6.9  --   --    ALBUMIN 2.3*  --   --    BILITOT 1.1*  --   --    ALKPHOS 101  --   --    AST 22  --   --    ALT 15  --   --    ANIONGAP 9 7* 5*     All pertinent labs within the past 24 hours have been reviewed.    Significant Imaging: I have reviewed all pertinent imaging results/findings within the past 24 hours.

## 2024-09-27 NOTE — NURSING TRANSFER
Nursing Transfer Note      9/27/2024   6:39 AM    Nurse giving handoff:Karrie  Nurse receiving handoff:Dawna    Reason patient is being transferred: Surgery    Transfer From: Tele    Transfer via stretcher    Transfer with nothing    Transported by Dawna    Transfer Vital Signs:  Blood Pressure:  Heart Rate:  O2:  Temperature:  Respirations:    Telemetry:   Order for Tele Monitor?       Additional Lines:     4eyes on Skin: yes    Medicines sent: none    Any special needs or follow-up needed: no    Patient belongings transferred with patient: No, in cabinet in pt room    Chart send with patient: Yes    Notified:     Patient reassessed at:  (date, time)  1  Upon arrival to floor:

## 2024-09-27 NOTE — ASSESSMENT & PLAN NOTE
Hyponatremia is likely due to Dehydration/hypovolemia. The patient's most recent sodium results are listed below.  Recent Labs     09/25/24  0858 09/26/24  0602 09/27/24  0500   * 132* 133*       Plan  - Correct the sodium by 4-6mEq in 24 hours.   - Will treat the hyponatremia with IV fluids as follows: s/p NS at 75/hr   - Monitor sodium Daily.   - Patient hyponatremia is  improving   - likely hypovolemic in setting of sepsis
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
## 2024-09-27 NOTE — ASSESSMENT & PLAN NOTE
- likely skin/soft tissue source  - repeat blood cultures 09/27  - TTE 09/26 with no vegetations noted   - ID consult for abx recommendations   - Continue IV Vancomycin

## 2024-09-27 NOTE — CONSULTS
Pharmacokinetic Assessment Follow Up: IV Vancomycin    Vancomycin serum concentration assessment(s):    The trough level was drawn correctly and can be used to guide therapy at this time. The measurement is below the desired definitive target range of 15 to 20 mcg/mL.    Vancomycin Regimen Plan:    Change regimen to Vancomycin 1500 mg IV every 12 hours with next serum trough concentration measured at 0400 prior to 4th new dose on 9/29    Drug levels (last 3 results):  Recent Labs   Lab Result Units 09/26/24  0208 09/27/24  1524   Vancomycin, Random ug/mL 9.1  --    Vancomycin-Trough ug/mL  --  10.9       Pharmacy will continue to follow and monitor vancomycin.    Please contact pharmacy at extension [phone] for questions regarding this assessment.    Thank you for the consult,   Stephanie Velasquez, PharmD 9/27/2024 4:51 PM         Patient brief summary:  Georges Daniels is a 38 y.o. male initiated on antimicrobial therapy with IV Vancomycin for treatment of bone/joint infection    The patient's current regimen is 1500 mg every 12 hours.    Drug Allergies:   Review of patient's allergies indicates:  No Known Allergies    Actual Body Weight:   66.2 kg    Renal Function:   Estimated Creatinine Clearance: 153.2 mL/min (based on SCr of 0.6 mg/dL).,     Dialysis Method (if applicable):  N/A    CBC (last 72 hours):  Recent Labs   Lab Result Units 09/25/24  0858 09/26/24  0602 09/27/24  0500   WBC K/uL 15.58* 10.13 9.67   Hemoglobin g/dL 9.6* 8.6* 9.7*   Hematocrit % 28.8* 26.4* 29.9*   Platelets K/uL 298 296 431   Gran % % 84.7* 85.4* 76.9*   Lymph % % 7.6* 6.5* 13.7*   Mono % % 6.5 6.8 8.2   Eosinophil % % 0.1 0.2 0.5   Basophil % % 0.4 0.2 0.2   Differential Method  Automated Automated Automated       Metabolic Panel (last 72 hours):  Recent Labs   Lab Result Units 09/25/24  0858 09/25/24  0913 09/26/24  0602 09/27/24  0500   Sodium mmol/L 129*  --  132* 133*   Potassium mmol/L 3.4*  --  3.6 3.7   Chloride mmol/L 96   --  103 103   CO2 mmol/L 24  --  22* 25   Glucose mg/dL 112*  --  123* 101   Glucose, UA   --  Negative  --   --    BUN mg/dL 9  --  11 6   Creatinine mg/dL 0.7  --  0.7 0.6   Creatinine, Urine mg/dL  --  77.6  --   --    Albumin g/dL 2.3*  --   --   --    Total Bilirubin mg/dL 1.1*  --   --   --    Alkaline Phosphatase U/L 101  --   --   --    AST U/L 22  --   --   --    ALT U/L 15  --   --   --    Magnesium mg/dL 2.0  2.0  --   --   --    Phosphorus mg/dL 2.0*  --   --   --        Vancomycin Administrations:  vancomycin given in the last 96 hours                     vancomycin (VANCOCIN) 1,000 mg in D5W 250 mL IVPB (mg) 1,000 mg New Bag 09/27/24 0747    vancomycin (VANCOCIN) 1,000 mg in D5W 250 mL IVPB (admixture device) (mg) 1,000 mg New Bag 09/27/24 0446     1,000 mg New Bag 09/26/24 1715     1,000 mg New Bag  0502    vancomycin 1.75 g in 5 % dextrose 500 mL IVPB (mg) 1,750 mg New Bag 09/25/24 1352                    Microbiologic Results:  Microbiology Results (last 7 days)       Procedure Component Value Units Date/Time    Blood culture [[id number]] Collected: 09/27/24 0500    Order Status: Sent Specimen: Blood Updated: 09/27/24 1044    Blood culture [[id number]] Collected: 09/27/24 0506    Order Status: Sent Specimen: Blood Updated: 09/27/24 1044    Blood culture x two cultures. Draw prior to antibiotics. [[id number]]  (Abnormal) Collected: 09/25/24 0901    Order Status: Completed Specimen: Blood from Peripheral, Forearm, Right Updated: 09/27/24 1005     Blood Culture, Routine Gram stain aer bottle: Gram positive cocci in clusters resembling Staph      Gram stain altaf bottle: Gram positive cocci in clusters resembling Staph      Results called to and read back by: Marimar Valle RN 09/26/2024  06:27      STAPHYLOCOCCUS AUREUS  ID consult required at [facility].Tyrel Ulrich and Avila hilario.  For susceptibility see order #[id number]      Narrative:      Aerobic and anaerobic    Blood culture x two cultures.  Draw prior to antibiotics. [[id number]]  (Abnormal) Collected: 09/25/24 0903    Order Status: Completed Specimen: Blood from Peripheral, Antecubital, Left Updated: 09/27/24 1003     Blood Culture, Routine Gram stain aer bottle: Gram positive cocci in clusters resembling Staph      Gram stain altaf bottle: Gram positive cocci in clusters resembling Staph      Results called to and read back by: Marimar Valle RN 09/26/2024  06:27      STAPHYLOCOCCUS AUREUS  Susceptibility pending  ID consult required at [facility].      Narrative:      Aerobic and anaerobic    Culture, Body Fluid - Bactec [[id number]]  (Abnormal) Collected: 09/25/24 1009    Order Status: Completed Specimen: Joint Fluid from Knee, Left Updated: 09/27/24 1001     Body Fluid Culture, Sterile Gram stain: Gram positive cocci in clusters resembling Staph 09/26/2024      20:56      STAPHYLOCOCCUS AUREUS  Susceptibility pending      Aerobic culture [[id number]]  (Abnormal) Collected: 09/25/24 1847    Order Status: Completed Specimen: Wound from Arm, Left Updated: 09/27/24 0959     Aerobic Bacterial Culture STAPHYLOCOCCUS AUREUS  Few  For susceptibility see order #[id number]      Narrative:      Left forearm soft tissue    Aerobic culture [[id number]]  (Abnormal) Collected: 09/25/24 1847    Order Status: Completed Specimen: Wound from Arm, Left Updated: 09/27/24 0954     Aerobic Bacterial Culture STAPHYLOCOCCUS AUREUS  Few  Susceptibility pending      Narrative:      Left forearm deep tissue    AFB Culture & Smear [[id number]] Collected: 09/25/24 1847    Order Status: Completed Specimen: Tissue from Arm, Left Updated: 09/27/24 0927     AFB Culture & Smear Culture in progress     AFB CULTURE STAIN No acid fast bacilli seen.    Narrative:      Left forearm deep tissue    AFB Culture & Smear [[id number]] Collected: 09/25/24 1746    Order Status: Completed Specimen: Joint Fluid from Knee, Left Updated: 09/27/24 0927     AFB Culture &  Smear Culture in progress     AFB CULTURE STAIN No acid fast bacilli seen.    AFB Culture & Smear [[id number]] Collected: 09/25/24 1803    Order Status: Completed Specimen: Wound from Arm, Left Updated: 09/27/24 0927     AFB Culture & Smear Culture in progress     AFB CULTURE STAIN No acid fast bacilli seen.    Narrative:      Left forearm superficial    AFB Culture & Smear [[id number]] Collected: 09/25/24 1812    Order Status: Completed Specimen: Wound from Arm, Left Updated: 09/27/24 0927     AFB Culture & Smear Culture in progress     AFB CULTURE STAIN No acid fast bacilli seen.    Narrative:      Left forearm near callous    AFB Culture & Smear [[id number]] Collected: 09/25/24 1847    Order Status: Completed Specimen: Tissue from Arm, Left Updated: 09/27/24 0927     AFB Culture & Smear Culture in progress     AFB CULTURE STAIN No acid fast bacilli seen.    Narrative:      Left forearm soft tissue    AFB Culture & Smear [[id number]] Collected: 09/25/24 1812    Order Status: Completed Specimen: Wound from Arm, Left Updated: 09/27/24 0927     AFB Culture & Smear Culture in progress     AFB CULTURE STAIN No acid fast bacilli seen.    Narrative:      Left ulna    AFB Culture & Smear [[id number]] Collected: 09/25/24 1009    Order Status: Completed Specimen: Joint Fluid from Knee, Left Updated: 09/27/24 0927     AFB Culture & Smear Culture in progress     AFB CULTURE STAIN No acid fast bacilli seen.    Fungus culture [[id number]] Collected: 09/27/24 0759    Order Status: Sent Specimen: Wound from Arm, Left Updated: 09/27/24 0845    Culture, Anaerobe [[id number]] Collected: 09/27/24 0759    Order Status: Sent Specimen: Wound from Arm, Left Updated: 09/27/24 0844    Aerobic culture [[id number]] Collected: 09/27/24 0759    Order Status: Sent Specimen: Wound from Arm, Left Updated: 09/27/24 0844    Gram stain [[id number]] Collected: 09/27/24 0759    Order Status: Sent Specimen: Wound from Arm, Left Updated: 09/27/24 0843     AFB Culture & Smear [[id number]] Collected: 09/27/24 0759    Order Status: Sent Specimen: Wound from Arm, Left Updated: 09/27/24 0843    Aerobic culture [[id number]] Collected: 09/27/24 0759    Order Status: Sent Specimen: Wound from Arm, Left Updated: 09/27/24 0842    Culture, Anaerobe [[id number]] Collected: 09/27/24 0759    Order Status: Sent Specimen: Wound from Arm, Left Updated: 09/27/24 0842    AFB Culture & Smear [[id number]] Collected: 09/27/24 0759    Order Status: Sent Specimen: Wound from Arm, Left Updated: 09/27/24 0841    Gram stain [[id number]] Collected: 09/27/24 0759    Order Status: Sent Specimen: Wound from Arm, Left Updated: 09/27/24 0841    Fungus culture [[id number]] Collected: 09/27/24 0759    Order Status: Sent Specimen: Wound from Arm, Left Updated: 09/27/24 0841    Culture, Anaerobe [[id number]] Collected: 09/25/24 1746    Order Status: Completed Specimen: Joint Fluid from Knee, Left Updated: 09/27/24 0749     Anaerobic Culture Culture in progress    Culture, Anaerobe [[id number]] Collected: 09/25/24 1847    Order Status: Completed Specimen: Tissue from Arm, Left Updated: 09/27/24 0749     Anaerobic Culture Culture in progress    Narrative:      Left forearm deep tissue    Culture, Anaerobe [[id number]] Collected: 09/25/24 1847    Order Status: Completed Specimen: Tissue from Arm, Left Updated: 09/27/24 0749     Anaerobic Culture Culture in progress    Narrative:      Left forearm soft tissue    Culture, Anaerobe [[id number]] Collected: 09/25/24 1812    Order Status: Completed Specimen: Wound from Arm, Left Updated: 09/27/24 0749     Anaerobic Culture Culture in progress    Narrative:      Left forearm near callous    Culture, Anaerobe [[id number]] Collected: 09/25/24 1812    Order Status: Completed Specimen: Wound from Arm, Left Updated: 09/27/24 0749     Anaerobic Culture Culture in progress    Narrative:      Left ulna    Culture, Anaerobe [[id number]] Collected: 09/25/24 1803     Order Status: Completed Specimen: Wound from Arm, Left Updated: 09/27/24 0748     Anaerobic Culture Culture in progress    Narrative:      Left forearm superficial    Aerobic culture [[id number]] Collected: 09/25/24 1812    Order Status: Completed Specimen: Wound from Arm, Left Updated: 09/27/24 0731     Aerobic Bacterial Culture No growth    Narrative:      Left forearm near callous    Aerobic culture [[id number]] Collected: 09/25/24 1812    Order Status: Completed Specimen: Wound from Arm, Left Updated: 09/27/24 0731     Aerobic Bacterial Culture No growth    Narrative:      Left ulna    Aerobic culture [[id number]] Collected: 09/25/24 1803    Order Status: Completed Specimen: Wound from Arm, Left Updated: 09/27/24 0731     Aerobic Bacterial Culture No growth    Narrative:      Left forearm superficial    Aerobic culture [[id number]] Collected: 09/25/24 1746    Order Status: Completed Specimen: Wound from Knee, Left Updated: 09/27/24 0730     Aerobic Bacterial Culture No growth    Gram stain [[id number]] Collected: 09/25/24 1746    Order Status: Completed Specimen: Joint Fluid from Knee, Left Updated: 09/26/24 0930     Gram Stain Result Many WBC's      No organisms seen    Gram stain [[id number]] Collected: 09/25/24 1812    Order Status: Completed Specimen: Wound from Arm, Left Updated: 09/26/24 0919     Gram Stain Result No WBC's      No organisms seen    Narrative:      Left ulna    Gram stain [[id number]] Collected: 09/25/24 1847    Order Status: Completed Specimen: Tissue from Arm, Left Updated: 09/26/24 0914     Gram Stain Result Rare WBC's      No organisms seen    Narrative:      Left forearm soft tissue    Gram stain [[id number]] Collected: 09/25/24 1812    Order Status: Completed Specimen: Wound from Arm, Left Updated: 09/26/24 0807     Gram Stain Result No WBC's      No organisms seen    Narrative:      Left forearm near callous    Gram stain [[id number]] Collected: 09/25/24 1847    Order Status:  Completed Specimen: Tissue from Arm, Left Updated: 09/26/24 0802     Gram Stain Result Rare WBC's      Rare Gram positive cocci    Narrative:      Left forearm deep tissue    Gram stain [[id number]] Collected: 09/25/24 1803    Order Status: Completed Specimen: Wound from Arm, Left Updated: 09/26/24 0800     Gram Stain Result Rare WBC's      No organisms seen    Narrative:      Left forearm superficial    MRSA/SA Rapid ID by PCR from Blood culture [[id number]]  (Abnormal) Collected: 09/25/24 0903    Order Status: Completed Updated: 09/26/24 0754     Staph aureus ID by PCR Positive     Methicillin Resistant ID by PCR Positive    Narrative:      Aerobic and anaerobic    Fungus culture [[id number]] Collected: 09/25/24 1847    Order Status: Sent Specimen: Tissue from Arm, Left Updated: 09/26/24 0155    Fungus culture [[id number]] Collected: 09/25/24 1847    Order Status: Sent Specimen: Tissue from Arm, Left Updated: 09/26/24 0155    Fungus culture [[id number]] Collected: 09/25/24 1746    Order Status: Sent Specimen: Joint Fluid from Knee, Left Updated: 09/26/24 0155    Fungus culture [[id number]] Collected: 09/25/24 1803    Order Status: Sent Specimen: Wound from Arm, Left Updated: 09/26/24 0155    Fungus culture [[id number]] Collected: 09/25/24 1812    Order Status: Sent Specimen: Wound from Arm, Left Updated: 09/26/24 0155    Fungus culture [[id number]] Collected: 09/25/24 1812    Order Status: Sent Specimen: Wound from Arm, Left Updated: 09/26/24 0155    Fungus culture [[id number]] Collected: 09/25/24 1009    Order Status: Sent Specimen: Joint Fluid from Knee, Left Updated: 09/25/24 2138    Culture, Anaerobe [[id number]] Collected: 09/25/24 1813    Order Status: Sent Specimen: Wound from Arm, Left Updated: 09/25/24 1813    AFB Culture & Smear [[id number]] Collected: 09/25/24 1813    Order Status: Sent Specimen: Wound from Arm, Left Updated: 09/25/24 1813    Gram stain [[id number]] Collected: 09/25/24 1813    Order  Status: Sent Specimen: Wound from Arm, Left Updated: 09/25/24 1813    Fungus culture [[id number]] Collected: 09/25/24 1813    Order Status: Sent Specimen: Wound from Arm, Left Updated: 09/25/24 1813    Aerobic culture [[id number]] Collected: 09/25/24 1813    Order Status: Sent Specimen: Wound from Arm, Left Updated: 09/25/24 1813    Gram stain [[id number]] Collected: 09/25/24 1009    Order Status: Completed Specimen: Joint Fluid from Knee, Left Updated: 09/25/24 1133     Gram Stain Result Many WBC's      No epithelial cells      No organisms seen    Influenza A & B by Molecular [[id number]] Collected: 09/25/24 0938    Order Status: Completed Specimen: Nasopharyngeal Swab Updated: 09/25/24 1012     Influenza A, Molecular Negative     Influenza B, Molecular Negative     Flu A & B Source Nasal swab

## 2024-09-27 NOTE — BRIEF OP NOTE
[facility] - Surgery ([facility])  Brief Operative Note    SUMMARY     Surgery Date: 9/27/2024     Surgeons and Role:     * Morena Lamar, DO - Primary    Assisting Surgeon: None    Pre-op Diagnosis:  Abscess of left arm [L02.414]    Post-op Diagnosis:  Post-Op Diagnosis Codes:     * Abscess of left arm [L02.414]    Procedure(s) (LRB):  IRRIGATION AND DEBRIDEMENT, UPPER EXTREMITY (Left)    Anesthesia: General    Implants:  * No implants in log *    Operative Findings: Still some purulent looking material but no chidi pus.   Culture swabs and bone sent for cultures    Estimated Blood Loss: * No values recorded between 9/27/2024  7:29 AM and 9/27/2024  8:26 AM *    Estimated Blood Loss has not been documented. EBL = 7.         Specimens:   Specimen (24h ago, onward)      None            [id number]

## 2024-09-27 NOTE — ASSESSMENT & PLAN NOTE
S/p washout of L knee and L forearm with ortho Dr. Lamar on 09/25/2024.   Prelim cultures -[facility]   On Vanco/Cefepime -  Will use cultures to adjust regime

## 2024-09-27 NOTE — TRANSFER OF CARE
"Anesthesia Transfer of Care Note    Patient: Georges Daniels    Procedure(s) Performed: Procedure(s) (LRB):  IRRIGATION AND DEBRIDEMENT, UPPER EXTREMITY (Left)    Patient location: PACU    Anesthesia Type: general    Transport from OR: Transported from OR on room air with adequate spontaneous ventilation    Post pain: adequate analgesia    Post assessment: no apparent anesthetic complications    Post vital signs: stable    Level of consciousness: responds to stimulation    Nausea/Vomiting: no nausea/vomiting    Complications: none    Transfer of care protocol was followed      Last vitals: Visit Vitals  /65   Pulse 89   Temp (!) 38.2 °C (100.7 °F)   Resp 16   Ht 6' 1" (1.854 m)   Wt 66.2 kg (146 lb)   SpO2 95%   BMI 19.26 kg/m²     "

## 2024-09-27 NOTE — ASSESSMENT & PLAN NOTE
Prior hx of IVDA, UDS on admission + for methamphetamines   Complicating management of above conditions   Will need LTAC placement for IV abx on discharge; mark following

## 2024-09-27 NOTE — CONSULTS
[facility])  Infectious Disease  Consult Note    Patient Name: Georges Daniels  MRN: [id number]  Admission Date: 9/25/2024  Hospital Length of Stay: 2 days  Attending Physician: Nataliya Garza MD  Primary Care Provider: Sally, Primary Doctor     Isolation Status: No active isolations    Patient information was obtained from patient, past medical records, and ER records.      Consults  Assessment/Plan:     Psychiatric  IV drug abuse  Needs drug cessation counseling     ID  * Septic arthritis of knee, left  S/p washout of L knee and L forearm with ortho Dr. Lamar on 09/25/2024.   Prelim cultures -[facility]   On Vanco/Cefepime -  Will use cultures to adjust regime    Staphylococcus aureus bacteremia  TTE -did not show vegetation  Isolate is MRSA- will stop Cefepime in AM  Continue Vanco  Will follow repeat blood culture in AM    Abscess of left arm  On Vanco/Cefepime- follow Ortho  Follow final cultures        Thank you for your consult. I will follow-up with patient. Please contact us if you have any additional questions.    Panda Holman MD, [facility]  Infectious Disease  [facility])    Subjective:     Principal Problem: Septic arthritis of knee, left    HPI: 38 year old man with PMH notable for IV drug abuse, homelessness who was admitted for left  arm and knee pain.   There was associated history of fever . T max 102.7 ..  Labs and imaging test -   WBC 15, UDS + amphetamines. Imaging notable for: large L knee effusion, MRI of LUE showed ulnar shaft chronic fracture with callus formation, fracture incompletely healed, also has 2 loculated fluid collections at margins of callus concerning for osteomyelitis, abscesses and cellulitis.   Body fluid culture- 09/25-  Body Fluid Culture, Sterile Gram stain: Gram positive cocci in clusters resembling Staph 09/26/2024     Blood culture- 09/25- [facility]   ECho-09/25- no veg    Past Medical History:   Diagnosis Date    Medical history  "non-contributory        Past Surgical History:   Procedure Laterality Date    CHONDROPLASTY OF KNEE Left 9/25/2024    Procedure: CHONDROPLASTY, KNEE;  Surgeon: Morena Lamar DO;  Location: [facility] OR;  Service: Orthopedics;  Laterality: Left;    IRRIGATION AND DEBRIDEMENT OF UPPER EXTREMITY Left 9/25/2024    Procedure: IRRIGATION AND DEBRIDEMENT, UPPER EXTREMITY;  Surgeon: Morena Lamar DO;  Location: [facility] OR;  Service: Orthopedics;  Laterality: Left;    KNEE ARTHROSCOPY W/ DEBRIDEMENT Left 9/25/2024    Procedure: ARTHROSCOPY, KNEE, WITH DEBRIDEMENT;  Surgeon: Morena Lamar DO;  Location: [facility] OR;  Service: Orthopedics;  Laterality: Left;    NO PAST SURGERIES         Review of patient's allergies indicates:  No Known Allergies    Medications:  No medications prior to admission.     Antibiotics (From admission, onward)      Start     Stop Route Frequency Ordered    09/26/24 0400  vancomycin (VANCOCIN) 1,000 mg in D5W 250 mL IVPB (admixture device)         -- IV Every 12 hours (non-standard times) 09/26/24 0306    09/25/24 1815  ceFEPIme (MAXIPIME) 2 g in D5W 100 mL IVPB (MB+)         -- IV Every 8 hours (non-standard times) 09/25/24 1144    09/25/24 1013  vancomycin - pharmacy to dose  (vancomycin IVPB (PEDS and ADULTS))        Placed in "And" Linked Group    -- IV pharmacy to manage frequency 09/25/24 1014          Antifungals (From admission, onward)      None          Antivirals (From admission, onward)      None               There is no immunization history on file for this patient.    Family History    None       Social History     Socioeconomic History    Marital status: Single   Tobacco Use    Smoking status: Every Day     Current packs/day: 1.00     Types: Cigarettes    Smokeless tobacco: Never   Substance and Sexual Activity    Alcohol use: No    Drug use: Not Currently    Sexual activity: Not Currently     Review of Systems   Constitutional:  Negative for activity change, appetite change, " chills, diaphoresis and fatigue.   Neurological:  Negative for dizziness and facial asymmetry.     Objective:     Vital Signs (Most Recent):  Temp: (!) 100.7 °F (38.2 °C) (09/27/24 0415)  Pulse: 89 (09/27/24 0505)  Resp: 16 (09/27/24 0415)  BP: 115/65 (09/27/24 0415)  SpO2: 95 % (09/27/24 0415) Vital Signs (24h Range):  Temp:  [98.7 °F (37.1 °C)-101 °F (38.3 °C)] 100.7 °F (38.2 °C)  Pulse:  [] 89  Resp:  [15-20] 16  SpO2:  [95 %-100 %] 95 %  BP: ()/(54-78) 115/65     Weight: 66.2 kg (146 lb)  Body mass index is 19.26 kg/m².    Estimated Creatinine Clearance: 153.2 mL/min (based on SCr of 0.6 mg/dL).     Physical Exam  Vitals and nursing note reviewed.   HENT:      Head: Normocephalic.   Cardiovascular:      Rate and Rhythm: Normal rate.   Pulmonary:      Effort: Pulmonary effort is normal.   Musculoskeletal:      Comments: Dressing noted   Skin:     Findings: Lesion present.   Neurological:      Mental Status: He is alert.        Significant Labs: Blood Culture:   Recent Labs   Lab 09/25/24  0901 09/25/24  0903   LABBLOO Gram stain aer bottle: Gram positive cocci in clusters resembling Staph  Gram stain altaf bottle: Gram positive cocci in clusters resembling Staph  Results called to and read back by: Marimar Valle RN 09/26/2024  06:27 Gram stain aer bottle: Gram positive cocci in clusters resembling Staph  Gram stain altaf bottle: Gram positive cocci in clusters resembling Staph  Results called to and read back by: Marimar Valle RN 09/26/2024  06:27     BMP:   Recent Labs   Lab 09/25/24  0858 09/26/24  0602 09/27/24  0500   *   < > 101   *   < > 133*   K 3.4*   < > 3.7   CL 96   < > 103   CO2 24   < > 25   BUN 9   < > 6   CREATININE 0.7   < > 0.6   CALCIUM 8.5*   < > 8.4*   MG 2.0  2.0  --   --     < > = values in this interval not displayed.     CBC:   Recent Labs   Lab 09/25/24  0858 09/26/24  0602 09/27/24  0500   WBC 15.58* 10.13 9.67   HGB 9.6* 8.6* 9.7*   HCT 28.8* 26.4* 29.9*     296 431     CMP:   Recent Labs   Lab 09/25/24  0858 09/26/24  0602 09/27/24  0500   * 132* 133*   K 3.4* 3.6 3.7   CL 96 103 103   CO2 24 22* 25   * 123* 101   BUN 9 11 6   CREATININE 0.7 0.7 0.6   CALCIUM 8.5* 7.8* 8.4*   PROT 6.9  --   --    ALBUMIN 2.3*  --   --    BILITOT 1.1*  --   --    ALKPHOS 101  --   --    AST 22  --   --    ALT 15  --   --    ANIONGAP 9 7* 5*     All pertinent labs within the past 24 hours have been reviewed.    Significant Imaging: I have reviewed all pertinent imaging results/findings within the past 24 hours.

## 2024-09-27 NOTE — HPI
38 year old man with PMH notable for IV drug abuse, homelessness who was admitted for left  arm and knee pain.   There was associated history of fever . T max 102.7 ..  Labs and imaging test -   WBC 15, UDS + amphetamines. Imaging notable for: large L knee effusion, MRI of LUE showed ulnar shaft chronic fracture with callus formation, fracture incompletely healed, also has 2 loculated fluid collections at margins of callus concerning for osteomyelitis, abscesses and cellulitis.   Body fluid culture- 09/25-  Body Fluid Culture, Sterile Gram stain: Gram positive cocci in clusters resembling Staph 09/26/2024     Blood culture- 09/25- [facility]   ECho-09/25- no veg

## 2024-09-27 NOTE — PROGRESS NOTES
"PREOP DIAGNOSIS:  LEFT ulna infected non-union    PLANNED PROCEDURE: Left forearm irrigation and debridement    Patient seen and examined in pre-op holding.      Patient Feeling better than 2 days ago.     Physical Exam  Vitals:    09/27/24 0505   BP:    Pulse: 89   Resp:    Temp:        GEN NAD, ill appearing    PSYCH A&Ox3, pleasant       PLAN    Risks and benefits reviewed with patient including possible failure of procedure to relieve symptoms, need for further surgery, risks of infection, bleeding, damage to nerves/arteries/tendons/cartilage/ligaments, blood clots, pneumonia and risks of anesthesia.  Patient given an opportunity to ask questions.  When his  questions were answered, he decided to proceed with surgery.       Extremity marked with the word "yes" and my initials.        Pre-op Antibiotic to be given by anesthesia    "

## 2024-09-27 NOTE — SUBJECTIVE & OBJECTIVE
Interval History: Febrile overnight. Underwent repeat I&D this AM. PT seen postop, he is awake, alert. Reports some LUE pain but improved since yesterday. Knee pain is also slightly better. Denies any further tingling to the extremities.     Review of Systems  Objective:     Vital Signs (Most Recent):  Temp: 98.2 °F (36.8 °C) (09/27/24 0911)  Pulse: 91 (09/27/24 0911)  Resp: 18 (09/27/24 0911)  BP: 128/84 (09/27/24 0911)  SpO2: (!) 94 % (09/27/24 0911) Vital Signs (24h Range):  Temp:  [98.2 °F (36.8 °C)-101 °F (38.3 °C)] 98.2 °F (36.8 °C)  Pulse:  [] 91  Resp:  [15-32] 18  SpO2:  [94 %-100 %] 94 %  BP: ()/(54-98) 128/84     Weight: 66.2 kg (146 lb)  Body mass index is 19.26 kg/m².    Intake/Output Summary (Last 24 hours) at 9/27/2024 1133  Last data filed at 9/27/2024 0830  Gross per 24 hour   Intake 2042.12 ml   Output 3700 ml   Net -1657.88 ml         Physical Exam  Vitals and nursing note reviewed.   Constitutional:       General: He is not in acute distress.     Appearance: He is ill-appearing.   Cardiovascular:      Rate and Rhythm: Normal rate and regular rhythm.   Pulmonary:      Effort: Pulmonary effort is normal. No respiratory distress.      Breath sounds: Normal breath sounds.   Abdominal:      General: Abdomen is flat. There is no distension.      Tenderness: There is no abdominal tenderness.   Musculoskeletal:      Comments: LUE and LLE dressings in place, SILT, able to move fingers and toes    Neurological:      Mental Status: He is alert. Mental status is at baseline.             Significant Labs: All pertinent labs within the past 24 hours have been reviewed.    Significant Imaging: I have reviewed all pertinent imaging results/findings within the past 24 hours.

## 2024-09-27 NOTE — PROGRESS NOTES
[facility] Medicine  Progress Note    Patient Name: Georges Daniels  MRN: [id number]  Patient Class: IP- Inpatient   Admission Date: 9/25/2024  Length of Stay: 2 days  Attending Physician: Nataliya Garza MD  Primary Care Provider: Sally, Primary Doctor        Subjective:     Principal Problem:Septic arthritis of knee, left        HPI:  Pt is a 37 YO  male with PMH notable for IV drug abuse, homelessness who presents to the ED for evaluation of L arm and knee pain. Pt reports that he has been having pain all over, but worse over the L arm and L knee over last day. Denies any recent trauma, injury, falls. Denies injections into joints. Reports feeling feverish over the last 4-5 days. In the ED, pt noted to have swelling to L forearm and L knee. Initial VS: Tmax 102.7, , /84, sats 100% on room air. Initial work up: WBC 15, Na 129, , procal 0.84, UDS + amphetamines. Imaging notable for: large L knee effusion, MRI of LUE showed ulnar shaft chronic fracture with callus formation, fracture incompletely healed, also has 2 loculated fluid collections at margins of callus concerning for osteomyelitis, abscesses and cellulitis. Pt underwent aspiration of L knee in the ED, aspirate yellow/cloudy with 45809 WBC with 89% segs, concerning for septic arthritis. Pt received Vanc + Cefepime in the ED. Orthopedics consulted and planning for washout of L knee and forearm today. Hospital medicine consulted for admission     Overview/Hospital Course:  Underwent washout of L knee and L forearm with ortho Dr. Lamar on 09/25/2024. Blood cultures from admission with staph in 2/2 sets, likely SST source of infection. Remains on IV Vanc + Cefepime. Infectious disease consulted for antibiotics recommendations. As blood cultures showed MRSA, Cefepime discontinued per ID. Underwent repeat I&D of LUE on 09/27 with Dr. Lamar and planned for another I&D on 09/3 per   Willard    Will likely need LTAC placement for long term IV abx on ID and ortho plans finalized. Not a candidate for home IV infusion as he is homeless and IVDU     Interval History: Febrile overnight. Underwent repeat I&D this AM. PT seen postop, he is awake, alert. Reports some LUE pain but improved since yesterday. Knee pain is also slightly better. Denies any further tingling to the extremities.     Review of Systems  Objective:     Vital Signs (Most Recent):  Temp: 98.2 °F (36.8 °C) (09/27/24 0911)  Pulse: 91 (09/27/24 0911)  Resp: 18 (09/27/24 0911)  BP: 128/84 (09/27/24 0911)  SpO2: (!) 94 % (09/27/24 0911) Vital Signs (24h Range):  Temp:  [98.2 °F (36.8 °C)-101 °F (38.3 °C)] 98.2 °F (36.8 °C)  Pulse:  [] 91  Resp:  [15-32] 18  SpO2:  [94 %-100 %] 94 %  BP: ()/(54-98) 128/84     Weight: 66.2 kg (146 lb)  Body mass index is 19.26 kg/m².    Intake/Output Summary (Last 24 hours) at 9/27/2024 1133  Last data filed at 9/27/2024 0830  Gross per 24 hour   Intake 2042.12 ml   Output 3700 ml   Net -1657.88 ml         Physical Exam  Vitals and nursing note reviewed.   Constitutional:       General: He is not in acute distress.     Appearance: He is ill-appearing.   Cardiovascular:      Rate and Rhythm: Normal rate and regular rhythm.   Pulmonary:      Effort: Pulmonary effort is normal. No respiratory distress.      Breath sounds: Normal breath sounds.   Abdominal:      General: Abdomen is flat. There is no distension.      Tenderness: There is no abdominal tenderness.   Musculoskeletal:      Comments: LUE and LLE dressings in place, SILT, able to move fingers and toes    Neurological:      Mental Status: He is alert. Mental status is at baseline.             Significant Labs: All pertinent labs within the past 24 hours have been reviewed.    Significant Imaging: I have reviewed all pertinent imaging results/findings within the past 24 hours.    Assessment/Plan:      * Septic arthritis of knee, left  - s/p  "aspiration in the ED with 64983 WBC, 89% polys  - orthopedics consulted- s/p washout of L knee 09/25 with Dr. Lamar   - Continue empiric IV Vanc ; vanc dosing and monitoring per pharmacy  - aspirate  cultures  NGTD   - ID consulted and following   - pain control with PO Roxicodone and IV mOrphine as needed  - continue PT/OT         Abscess of left arm  - MRI concerning for abscess, osteomyelitis and cellulitis of L arm   - orthopedics consulted; s/p washout 09/25/2024, 09/27/2024 with Dr. Lamar   - discussed with Dr. Lamar- pt will undergo repeat washout on 09/30/2024  - prelim op culture with MRSA   - continue IV Antibiotics as above    - ID consult for abx recommendations     Sepsis  This patient does have evidence of infective focus  My overall impression is sepsis.  Source: Skin and Soft Tissue (location L arm and L knee )  Antibiotics given-   Antibiotics (72h ago, onward)      Start     Stop Route Frequency Ordered    09/26/24 0400  vancomycin (VANCOCIN) 1,000 mg in D5W 250 mL IVPB (admixture device)         -- IV Every 12 hours (non-standard times) 09/26/24 0306    09/25/24 1013  vancomycin - pharmacy to dose  (vancomycin IVPB (PEDS and ADULTS))        Placed in "And" Linked Group    -- IV pharmacy to manage frequency 09/25/24 1014          Latest lactate reviewed-  Recent Labs   Lab 09/25/24  1310   LACTATE 0.8       Organ dysfunction indicated by  None     Fluid challenge s/p sepsis fluids in the Ed      Post- resuscitation assessment No - Post resuscitation assessment not needed       Will Not start Pressors   Source control achieved by: IV antibiotics, surgical washout     Staphylococcus aureus bacteremia  - likely skin/soft tissue source  - repeat blood cultures 09/27  - TTE 09/26 with no vegetations noted   - ID consult for abx recommendations   - Continue IV Vancomycin       Hepatitis C antibody test positive  - confirmatory test pending, likely in setting of IVDA       Hyponatremia  Hyponatremia " is likely due to Dehydration/hypovolemia. The patient's most recent sodium results are listed below.  Recent Labs     09/25/24  0858 09/26/24  0602 09/27/24  0500   * 132* 133*       Plan  - Correct the sodium by 4-6mEq in 24 hours.   - Will treat the hyponatremia with IV fluids as follows: s/p NS at 75/hr   - Monitor sodium Daily.   - Patient hyponatremia is  improving   - likely hypovolemic in setting of sepsis     IV drug abuse  Prior hx of IVDA, UDS on admission + for methamphetamines   Complicating management of above conditions   Will need LTAC placement for IV abx on discharge; sw following         VTE Risk Mitigation (From admission, onward)           Ordered     heparin (porcine) injection 5,000 Units  Every 8 hours         09/27/24 1138     Reason for No Pharmacological VTE Prophylaxis  Once        Question:  Reasons:  Answer:  Physician Provided (leave comment)  Comment:  surgical intervention planned    09/25/24 1434     IP VTE HIGH RISK PATIENT  Once         09/25/24 1434     Place sequential compression device  Until discontinued         09/25/24 1434                    Discharge Planning   HAYLEE:      Code Status: Full Code   Is the patient medically ready for discharge?:     Reason for patient still in hospital (select all that apply): Patient trending condition, Laboratory test, Treatment, Consult recommendations, and Pending disposition  Discharge Plan A: Jesus Garza MD  Department of Hospital Medicine   '[place] - Telemetry ([facility])

## 2024-09-27 NOTE — ASSESSMENT & PLAN NOTE
- MRI concerning for abscess, osteomyelitis and cellulitis of L arm   - orthopedics consulted; s/p washout 09/25/2024, 09/27/2024 with Dr. Lamar   - discussed with Dr. Lamar- pt will undergo repeat washout on 09/30/2024  - prelim op culture with MRSA   - continue IV Antibiotics as above    - ID consult for abx recommendations

## 2024-09-27 NOTE — ANESTHESIA PROCEDURE NOTES
Intubation    Date/Time: 9/27/2024 7:10 AM    Performed by: Sandip Carolina CRNA  Authorized by: Yonatan Hair MD    Intubation:     Induction:  Intravenous    Intubated:  Postinduction    Mask Ventilation:  Easy mask    Attempts:  1    Attempted By:  CRNA    Method of Intubation:  Video laryngoscopy    Blade:  Mendes 3    Laryngeal View Grade: Grade I - full view of cords      Difficult Airway Encountered?: No      Complications:  None    Airway Device:  Oral endotracheal tube    Airway Device Size:  7.5    Style/Cuff Inflation:  Cuffed (inflated to minimal occlusive pressure)    Tube secured:  22    Secured at:  The lips    Placement Verified By:  Capnometry    Complicating Factors:  None    Findings Post-Intubation:  BS equal bilateral

## 2024-09-28 LAB
ANION GAP SERPL CALC-SCNC: 6 MMOL/L (ref 8–16)
BACTERIA SPEC AEROBE CULT: ABNORMAL
BACTERIA SPEC AEROBE CULT: ABNORMAL
BASOPHILS # BLD AUTO: 0.03 K/UL (ref 0–0.2)
BASOPHILS NFR BLD: 0.3 % (ref 0–1.9)
BUN SERPL-MCNC: 12 MG/DL (ref 6–20)
CALCIUM SERPL-MCNC: 8.5 MG/DL (ref 8.7–10.5)
CHLORIDE SERPL-SCNC: 106 MMOL/L (ref 95–110)
CO2 SERPL-SCNC: 25 MMOL/L (ref 23–29)
CREAT SERPL-MCNC: 0.6 MG/DL (ref 0.5–1.4)
DIFFERENTIAL METHOD BLD: ABNORMAL
EOSINOPHIL # BLD AUTO: 0.1 K/UL (ref 0–0.5)
EOSINOPHIL NFR BLD: 0.6 % (ref 0–8)
ERYTHROCYTE [DISTWIDTH] IN BLOOD BY AUTOMATED COUNT: 13.6 % (ref 11.5–14.5)
EST. GFR  (NO RACE VARIABLE): >60 ML/MIN/1.73 M^2
GLUCOSE SERPL-MCNC: 130 MG/DL (ref 70–110)
GRAM STN SPEC: NORMAL
GRAM STN SPEC: NORMAL
HCT VFR BLD AUTO: 28.2 % (ref 40–54)
HGB BLD-MCNC: 9.4 G/DL (ref 14–18)
IMM GRANULOCYTES # BLD AUTO: 0.08 K/UL (ref 0–0.04)
IMM GRANULOCYTES NFR BLD AUTO: 0.7 % (ref 0–0.5)
LYMPHOCYTES # BLD AUTO: 1.7 K/UL (ref 1–4.8)
LYMPHOCYTES NFR BLD: 15.4 % (ref 18–48)
MCH RBC QN AUTO: 27.9 PG (ref 27–31)
MCHC RBC AUTO-ENTMCNC: 33.3 G/DL (ref 32–36)
MCV RBC AUTO: 84 FL (ref 82–98)
MONOCYTES # BLD AUTO: 0.8 K/UL (ref 0.3–1)
MONOCYTES NFR BLD: 6.9 % (ref 4–15)
NEUTROPHILS # BLD AUTO: 8.4 K/UL (ref 1.8–7.7)
NEUTROPHILS NFR BLD: 76.1 % (ref 38–73)
NRBC BLD-RTO: 0 /100 WBC
PLATELET # BLD AUTO: 517 K/UL (ref 150–450)
PMV BLD AUTO: 10.7 FL (ref 9.2–12.9)
POTASSIUM SERPL-SCNC: 3.7 MMOL/L (ref 3.5–5.1)
RBC # BLD AUTO: 3.37 M/UL (ref 4.6–6.2)
SODIUM SERPL-SCNC: 137 MMOL/L (ref 136–145)
WBC # BLD AUTO: 11.03 K/UL (ref 3.9–12.7)

## 2024-09-28 PROCEDURE — 97116 GAIT TRAINING THERAPY: CPT | Mod: CQ

## 2024-09-28 PROCEDURE — 97530 THERAPEUTIC ACTIVITIES: CPT | Mod: CQ

## 2024-09-28 PROCEDURE — 27000207 HC ISOLATION

## 2024-09-28 PROCEDURE — 25000003 PHARM REV CODE 250: Performed by: INTERNAL MEDICINE

## 2024-09-28 PROCEDURE — 36415 COLL VENOUS BLD VENIPUNCTURE: CPT | Performed by: ORTHOPAEDIC SURGERY

## 2024-09-28 PROCEDURE — 25000003 PHARM REV CODE 250: Performed by: ORTHOPAEDIC SURGERY

## 2024-09-28 PROCEDURE — 63600175 PHARM REV CODE 636 W HCPCS: Performed by: ORTHOPAEDIC SURGERY

## 2024-09-28 PROCEDURE — 21400001 HC TELEMETRY ROOM

## 2024-09-28 PROCEDURE — 63600175 PHARM REV CODE 636 W HCPCS: Performed by: INTERNAL MEDICINE

## 2024-09-28 PROCEDURE — 85025 COMPLETE CBC W/AUTO DIFF WBC: CPT | Performed by: ORTHOPAEDIC SURGERY

## 2024-09-28 PROCEDURE — 80048 BASIC METABOLIC PNL TOTAL CA: CPT | Performed by: ORTHOPAEDIC SURGERY

## 2024-09-28 RX ORDER — MUPIROCIN 20 MG/G
OINTMENT TOPICAL 2 TIMES DAILY
Status: COMPLETED | OUTPATIENT
Start: 2024-09-28 | End: 2024-10-02

## 2024-09-28 RX ORDER — MORPHINE SULFATE 2 MG/ML
2 INJECTION, SOLUTION INTRAMUSCULAR; INTRAVENOUS EVERY 4 HOURS PRN
Status: DISCONTINUED | OUTPATIENT
Start: 2024-09-28 | End: 2024-10-03 | Stop reason: HOSPADM

## 2024-09-28 RX ADMIN — MUPIROCIN: 20 OINTMENT TOPICAL at 12:09

## 2024-09-28 RX ADMIN — OXYCODONE HYDROCHLORIDE 5 MG: 5 TABLET ORAL at 10:09

## 2024-09-28 RX ADMIN — SODIUM CHLORIDE: 9 INJECTION, SOLUTION INTRAVENOUS at 04:09

## 2024-09-28 RX ADMIN — VANCOMYCIN HYDROCHLORIDE 1500 MG: 1.5 INJECTION, POWDER, LYOPHILIZED, FOR SOLUTION INTRAVENOUS at 04:09

## 2024-09-28 RX ADMIN — MORPHINE SULFATE 2 MG: 2 INJECTION, SOLUTION INTRAMUSCULAR; INTRAVENOUS at 09:09

## 2024-09-28 RX ADMIN — SENNOSIDES AND DOCUSATE SODIUM 1 TABLET: 50; 8.6 TABLET ORAL at 08:09

## 2024-09-28 RX ADMIN — MORPHINE SULFATE 2 MG: 4 INJECTION INTRAVENOUS at 03:09

## 2024-09-28 RX ADMIN — MORPHINE SULFATE 2 MG: 4 INJECTION INTRAVENOUS at 08:09

## 2024-09-28 RX ADMIN — MORPHINE SULFATE 2 MG: 2 INJECTION, SOLUTION INTRAMUSCULAR; INTRAVENOUS at 12:09

## 2024-09-28 RX ADMIN — ACETAMINOPHEN 650 MG: 325 TABLET ORAL at 09:09

## 2024-09-28 RX ADMIN — MORPHINE SULFATE 2 MG: 2 INJECTION, SOLUTION INTRAMUSCULAR; INTRAVENOUS at 04:09

## 2024-09-28 RX ADMIN — HEPARIN SODIUM 5000 UNITS: 5000 INJECTION INTRAVENOUS; SUBCUTANEOUS at 03:09

## 2024-09-28 RX ADMIN — HEPARIN SODIUM 5000 UNITS: 5000 INJECTION INTRAVENOUS; SUBCUTANEOUS at 09:09

## 2024-09-28 RX ADMIN — MUPIROCIN: 20 OINTMENT TOPICAL at 09:09

## 2024-09-28 NOTE — SUBJECTIVE & OBJECTIVE
Interval History: NAEON. Pt seen and examined, significant other sleeping at bedside. Pt has no complaints today, L arm and leg pain controlled. Denies numbness, CP, SOB.     Review of Systems  Objective:     Vital Signs (Most Recent):  Temp: 98 °F (36.7 °C) (09/28/24 0813)  Pulse: 79 (09/28/24 0821)  Resp: 16 (09/28/24 1035)  BP: 132/74 (09/28/24 0813)  SpO2: 99 % (09/28/24 0813) Vital Signs (24h Range):  Temp:  [97.6 °F (36.4 °C)-98.6 °F (37 °C)] 98 °F (36.7 °C)  Pulse:  [70-95] 79  Resp:  [14-20] 16  SpO2:  [97 %-99 %] 99 %  BP: (101-132)/(61-84) 132/74     Weight: 66.2 kg (146 lb)  Body mass index is 19.26 kg/m².  No intake or output data in the 24 hours ending 09/28/24 1146      Physical Exam  Vitals and nursing note reviewed.   Constitutional:       General: He is not in acute distress.  Cardiovascular:      Rate and Rhythm: Normal rate and regular rhythm.      Heart sounds: No murmur heard.  Pulmonary:      Effort: Pulmonary effort is normal.      Breath sounds: Normal breath sounds. No wheezing or rales.   Abdominal:      General: Bowel sounds are normal. There is no distension.      Palpations: Abdomen is soft.      Tenderness: There is no abdominal tenderness.   Musculoskeletal:      Right lower leg: No edema.      Comments: LUE and LLE ACE dressings CDI    Neurological:      Mental Status: He is alert. Mental status is at baseline.      Comments: Flat, withdrawn affect              Significant Labs: All pertinent labs within the past 24 hours have been reviewed.    Significant Imaging: I have reviewed all pertinent imaging results/findings within the past 24 hours.

## 2024-09-28 NOTE — ASSESSMENT & PLAN NOTE
- likely skin/soft tissue source  - repeat blood cultures 09/27 NGTD   - TTE 09/26 with no vegetations noted   - ID consult for abx recommendations   - Continue IV Vancomycin

## 2024-09-28 NOTE — PT/OT/SLP PROGRESS
Physical Therapy  Treatment    Georges Daniels   MRN: [id number]   Admitting Diagnosis: Septic arthritis of knee, left       PT Start Time: 0925     PT Stop Time: 0950    PT Total Time (min): 25 min       Billable Minutes:  Gait Training 15 and Therapeutic Activity 10    Treatment Type: Treatment  PT/PTA: PTA     Number of PTA visits since last PT visit: 1       General Precautions: Standard, fall  Orthopedic Precautions: LLE weight bearing as tolerated, LUE weight bearing as tolerated  Braces: N/A  Respiratory Status: Room air    Spiritual, Cultural Beliefs, Spiritism Practices, Values that Affect Care: no    Subjective:  Communicated with GEORGES prior to session.      Pain/Comfort  Pain Rating 1: 0/10  Pain Rating Post-Intervention 1: 0/10    Treatment and Education:    DISCUSSED WEIGHT BEARING PRECAUTIONS WITH PATIENT     BED MOB AND ALL T/F'S WITH I     AMBULATED WITH NO ASSISTIVE DEVICE IN ROOM FWB ON L LOWER EXTREMITY OVER OBSTACLES.     PATIENT REPORTS NO PAIN AMBULATING WITHOUT ASSISTIVE DEVICE.     2 X 10 SITTING: HIP FLEXION, AP, LONG ARCH QUAD, AND HIP ADDUCTION WITH PILLOW BETWEEN KNEES.     NONCOMPLIANT WITH WEIGHT BEARING PRECAUTIONS AND SUSPECT HE WILL BE NONCOMPLIANT WITH CALL DON'T FALL PROCEDURES.      AM-PAC 6 CLICK MOBILITY  How much help from another person does this patient currently need?   1 = Unable, Total/Dependent Assistance  2 = A lot, Maximum/Moderate Assistance  3 = A little, Minimum/Contact Guard/Supervision  4 = None, Modified [place]/Independent    Turning over in bed (including adjusting bedclothes, sheets and blankets)?: 4  Sitting down on and standing up from a chair with arms (e.g., wheelchair, bedside commode, etc.): 4  Moving from lying on back to sitting on the side of the bed?: 4  Moving to and from a bed to a chair (including a wheelchair)?: 4  Need to walk in hospital room?: 4  Climbing 3-5 steps with a railing?:  (NA)    AM-PAC Raw Score CMS G-Code Modifier  Level of Impairment Assistance   6 % Total / Unable   7 - 9 CM 80 - 100% Maximal Assist   10 - 14 CL 60 - 80% Moderate Assist   15 - 19 CK 40 - 60% Moderate Assist   20 - 22 CJ 20 - 40% Minimal Assist   23 CI 1-20% SBA / CGA   24 CH 0% Independent/ Mod I     Patient left supine with all lines intact and call button in reach.    Assessment:  Georges Daniels is a 38 y.o. male with a medical diagnosis of Septic arthritis of knee, left and presents with .    Rehab identified problem list/impairments: decreased safety awareness, weakness, impaired endurance, impaired self care skills, impaired functional mobility, decreased upper extremity function, decreased lower extremity function, orthopedic precautions (PT'S NONCOMPLIANCE)    Rehab potential is fair.    Activity tolerance: Fair    Discharge recommendations: High Intensity Therapy      Barriers to discharge:      Equipment recommendations: to be determined by next level of care     GOALS:   Multidisciplinary Problems       Physical Therapy Goals          Problem: Physical Therapy    Goal Priority Disciplines Outcome Interventions   Physical Therapy Goal     PT, PT/OT     Description: Goals to be met by 10/10/24.  1. Pt will complete bed mobility CGA.  2. Pt will complete sit to stand MIN A.  3. Pt will ambulate 20ft MIN A with platform RW.  4. Pt will increase AMPAC score by 2 points to progress functional mobility.                       PLAN:    Patient to be seen 3 x/week to address the above listed problems via gait training, therapeutic activities, therapeutic exercises  Plan of Care expires: 10/10/24  Plan of Care reviewed with: patient         09/28/2024

## 2024-09-28 NOTE — PLAN OF CARE
DISCUSSED WEIGHT BEARING PRECAUTIONS WITH PATIENT     BED MOB AND ALL T/F'S WITH I     AMBULATED WITH NO ASSISTIVE DEVICE IN ROOM FWB ON L LOWER EXTREMITY OVER OBSTACLES.     PATIENT REPORTS NO PAIN AMBULATING WITHOUT ASSISTIVE DEVICE.     2 X 10 SITTING: HIP FLEXION, AP, LONG ARCH QUAD, AND HIP ADDUCTION WITH PILLOW BETWEEN KNEES.     NONCOMPLIANT WITH WEIGHT BEARING PRECAUTIONS AND SUSPECT HE WILL BE NONCOMPLIANT WITH CALL DON'T FALL PROCEDURES.

## 2024-09-28 NOTE — PLAN OF CARE
"AAOx4  NAD  VSS    Bed in low & locked position with call llight in reach. Bed alarm on & audible. Will continue to monitor. 24h cc complete.    /84 (BP Location: Right arm, Patient Position: Lying)   Pulse 87   Temp 98.2 °F (36.8 °C) (Oral)   Resp 17   Ht 6' 1" (1.854 m)   Wt 66.2 kg (146 lb)   SpO2 98%   BMI 19.26 kg/m²     "

## 2024-09-28 NOTE — ASSESSMENT & PLAN NOTE
Hyponatremia is likely due to Dehydration/hypovolemia. The patient's most recent sodium results are listed below.  Recent Labs     09/26/24  0602 09/27/24  0500 09/28/24  0524   * 133* 137       Plan  - Correct the sodium by 4-6mEq in 24 hours.   - Will treat the hyponatremia with IV fluids as follows: s/p NS at 75/hr   - Monitor sodium Daily.   - Patient hyponatremia is resolved  - likely hypovolemic in setting of sepsis

## 2024-09-28 NOTE — PROGRESS NOTES
[facility] Medicine  Progress Note    Patient Name: Georges Daniels  MRN: [id number]  Patient Class: IP- Inpatient   Admission Date: 9/25/2024  Length of Stay: 3 days  Attending Physician: Nataliya Garza MD  Primary Care Provider: Sally, Primary Doctor        Subjective:     Principal Problem:Septic arthritis of knee, left        HPI:  Pt is a 37 YO  male with PMH notable for IV drug abuse, homelessness who presents to the ED for evaluation of L arm and knee pain. Pt reports that he has been having pain all over, but worse over the L arm and L knee over last day. Denies any recent trauma, injury, falls. Denies injections into joints. Reports feeling feverish over the last 4-5 days. In the ED, pt noted to have swelling to L forearm and L knee. Initial VS: Tmax 102.7, , /84, sats 100% on room air. Initial work up: WBC 15, Na 129, , procal 0.84, UDS + amphetamines. Imaging notable for: large L knee effusion, MRI of LUE showed ulnar shaft chronic fracture with callus formation, fracture incompletely healed, also has 2 loculated fluid collections at margins of callus concerning for osteomyelitis, abscesses and cellulitis. Pt underwent aspiration of L knee in the ED, aspirate yellow/cloudy with 78814 WBC with 89% segs, concerning for septic arthritis. Pt received Vanc + Cefepime in the ED. Orthopedics consulted and planning for washout of L knee and forearm today. Hospital medicine consulted for admission     Overview/Hospital Course:  Underwent washout of L knee and L forearm with ortho Dr. Lamar on 09/25/2024. Blood cultures from admission with staph in 2/2 sets, likely SST source of infection. Remains on IV Vanc + Cefepime. Infectious disease consulted for antibiotics recommendations. As blood cultures showed MRSA, Cefepime discontinued per ID. Underwent repeat I&D of LUE on 09/27 with Dr. Lamar and planned for another I&D on 09/3 per   Willard    Will likely need LTAC placement for long term IV abx on ID and ortho plans finalized. Not a candidate for home IV infusion as he is homeless and IVDU     Interval History: NAEON. Pt seen and examined, significant other sleeping at bedside. Pt has no complaints today, L arm and leg pain controlled. Denies numbness, CP, SOB.     Review of Systems  Objective:     Vital Signs (Most Recent):  Temp: 98 °F (36.7 °C) (09/28/24 0813)  Pulse: 79 (09/28/24 0821)  Resp: 16 (09/28/24 1035)  BP: 132/74 (09/28/24 0813)  SpO2: 99 % (09/28/24 0813) Vital Signs (24h Range):  Temp:  [97.6 °F (36.4 °C)-98.6 °F (37 °C)] 98 °F (36.7 °C)  Pulse:  [70-95] 79  Resp:  [14-20] 16  SpO2:  [97 %-99 %] 99 %  BP: (101-132)/(61-84) 132/74     Weight: 66.2 kg (146 lb)  Body mass index is 19.26 kg/m².  No intake or output data in the 24 hours ending 09/28/24 1146      Physical Exam  Vitals and nursing note reviewed.   Constitutional:       General: He is not in acute distress.  Cardiovascular:      Rate and Rhythm: Normal rate and regular rhythm.      Heart sounds: No murmur heard.  Pulmonary:      Effort: Pulmonary effort is normal.      Breath sounds: Normal breath sounds. No wheezing or rales.   Abdominal:      General: Bowel sounds are normal. There is no distension.      Palpations: Abdomen is soft.      Tenderness: There is no abdominal tenderness.   Musculoskeletal:      Right lower leg: No edema.      Comments: LUE and LLE ACE dressings CDI    Neurological:      Mental Status: He is alert. Mental status is at baseline.      Comments: Flat, withdrawn affect              Significant Labs: All pertinent labs within the past 24 hours have been reviewed.    Significant Imaging: I have reviewed all pertinent imaging results/findings within the past 24 hours.    Assessment/Plan:      * Septic arthritis of knee, left  - s/p aspiration in the ED with 33144 WBC, 89% polys  - orthopedics consulted- s/p washout of L knee 09/25 with   "Willard   - Continue empiric IV Vanc ; vanc dosing and monitoring per pharmacy  - aspirate  cultures  NGTD   - ID consulted and following   - pain control with PO Roxicodone and IV mOrphine as needed  - continue PT/OT         Abscess of left arm  - MRI concerning for abscess, osteomyelitis and cellulitis of L arm   - orthopedics consulted; s/p washout 09/25/2024, 09/27/2024 with Dr. Lamar   - discussed with Dr. Lamar- pt will undergo repeat washout on 09/30/2024  - prelim op culture with MRSA   - continue IV Antibiotics as above    - ID consult for abx recommendations - case discussed with Dr. Holman     Sepsis  This patient does have evidence of infective focus  My overall impression is sepsis.  Source: Skin and Soft Tissue (location L arm and L knee )  Antibiotics given-   Antibiotics (72h ago, onward)      Start     Stop Route Frequency Ordered    09/28/24 1145  mupirocin 2 % ointment         10/03/24 0859 Nasl 2 times daily 09/28/24 1034    09/27/24 1700  vancomycin 1,500 mg in D5W 250 mL IVPB (admixture device)         -- IV Every 12 hours (non-standard times) 09/27/24 1631    09/25/24 1013  vancomycin - pharmacy to dose  (vancomycin IVPB (PEDS and ADULTS))        Placed in "And" Linked Group    -- IV pharmacy to manage frequency 09/25/24 1014          Latest lactate reviewed-  Recent Labs   Lab 09/25/24  1310   LACTATE 0.8       Organ dysfunction indicated by  None     Fluid challenge s/p sepsis fluids in the Ed      Post- resuscitation assessment No - Post resuscitation assessment not needed       Will Not start Pressors   Source control achieved by: IV antibiotics, surgical washout     Staphylococcus aureus bacteremia  - likely skin/soft tissue source  - repeat blood cultures 09/27 NGTD   - TTE 09/26 with no vegetations noted   - ID consult for abx recommendations   - Continue IV Vancomycin       Hepatitis C antibody test positive  - confirmatory test pending, likely in setting of IVDA "       Hyponatremia  Hyponatremia is likely due to Dehydration/hypovolemia. The patient's most recent sodium results are listed below.  Recent Labs     09/26/24  0602 09/27/24  0500 09/28/24  0524   * 133* 137       Plan  - Correct the sodium by 4-6mEq in 24 hours.   - Will treat the hyponatremia with IV fluids as follows: s/p NS at 75/hr   - Monitor sodium Daily.   - Patient hyponatremia is resolved  - likely hypovolemic in setting of sepsis     IV drug abuse  Prior hx of IVDA, UDS on admission + for methamphetamines   Complicating management of above conditions   Will need LTAC placement for IV abx on discharge; sw following         VTE Risk Mitigation (From admission, onward)           Ordered     heparin (porcine) injection 5,000 Units  Every 8 hours         09/27/24 1138     Reason for No Pharmacological VTE Prophylaxis  Once        Question:  Reasons:  Answer:  Physician Provided (leave comment)  Comment:  surgical intervention planned    09/25/24 1434     IP VTE HIGH RISK PATIENT  Once         09/25/24 1434     Place sequential compression device  Until discontinued         09/25/24 1434                    Discharge Planning   HAYLEE:      Code Status: Full Code   Is the patient medically ready for discharge?:     Reason for patient still in hospital (select all that apply): Patient trending condition, Laboratory test, Treatment, Consult recommendations, and Pending disposition  Discharge Plan A: Jesus Garza MD  Department of Hospital Medicine   [facility] - Telemetry ([facility])

## 2024-09-28 NOTE — ASSESSMENT & PLAN NOTE
- s/p aspiration in the ED with 66853 WBC, 89% polys  - orthopedics consulted- s/p washout of L knee 09/25 with Dr. Lamar   - Continue empiric IV Vanc ; vanc dosing and monitoring per pharmacy  - aspirate  cultures  NGTD   - ID consulted and following   - pain control with PO Roxicodone and IV mOrphine as needed  - continue PT/OT

## 2024-09-28 NOTE — ASSESSMENT & PLAN NOTE
"This patient does have evidence of infective focus  My overall impression is sepsis.  Source: Skin and Soft Tissue (location L arm and L knee )  Antibiotics given-   Antibiotics (72h ago, onward)    Start     Stop Route Frequency Ordered    09/28/24 1145  mupirocin 2 % ointment         10/03/24 0859 Nasl 2 times daily 09/28/24 1034    09/27/24 1700  vancomycin 1,500 mg in D5W 250 mL IVPB (admixture device)         -- IV Every 12 hours (non-standard times) 09/27/24 1631    09/25/24 1013  vancomycin - pharmacy to dose  (vancomycin IVPB (PEDS and ADULTS))        Placed in "And" Linked Group    -- IV pharmacy to manage frequency 09/25/24 1014        Latest lactate reviewed-  Recent Labs   Lab 09/25/24  1310   LACTATE 0.8       Organ dysfunction indicated by  None     Fluid challenge s/p sepsis fluids in the Ed      Post- resuscitation assessment No - Post resuscitation assessment not needed       Will Not start Pressors   Source control achieved by: IV antibiotics, surgical washout   "

## 2024-09-28 NOTE — PLAN OF CARE
L arm/leg dressing CDI, pulses palpable. Updated patient on plan of care. Instructed patient to use call light for assistance, call light in reach. Hourly rounding performed. Vitals q4 hours. Education provided, questions answered/encouraged. Chart check complete.     Problem: Adult Inpatient Plan of Care  Goal: Plan of Care Review  Outcome: Progressing  Goal: Patient-Specific Goal (Individualized)  Outcome: Progressing  Goal: Absence of Hospital-Acquired Illness or Injury  Outcome: Progressing  Goal: Optimal Comfort and Wellbeing  Outcome: Progressing  Goal: Readiness for Transition of Care  Outcome: Progressing     Problem: Sepsis/Septic Shock  Goal: Optimal Coping  Outcome: Progressing  Goal: Absence of Bleeding  Outcome: Progressing  Goal: Blood Glucose Level Within Targeted Range  Outcome: Progressing  Goal: Absence of Infection Signs and Symptoms  Outcome: Progressing  Goal: Optimal Nutrition Intake  Outcome: Progressing     Problem: Wound  Goal: Optimal Coping  Outcome: Progressing  Goal: Optimal Functional Ability  Outcome: Progressing  Goal: Absence of Infection Signs and Symptoms  Outcome: Progressing  Goal: Improved Oral Intake  Outcome: Progressing  Goal: Optimal Pain Control and Function  Outcome: Progressing  Goal: Skin Health and Integrity  Outcome: Progressing  Goal: Optimal Wound Healing  Outcome: Progressing     Problem: Infection  Goal: Absence of Infection Signs and Symptoms  Outcome: Progressing     Problem: Fall Injury Risk  Goal: Absence of Fall and Fall-Related Injury  Outcome: Progressing     Problem: Skin Injury Risk Increased  Goal: Skin Health and Integrity  Outcome: Progressing

## 2024-09-28 NOTE — ASSESSMENT & PLAN NOTE
- MRI concerning for abscess, osteomyelitis and cellulitis of L arm   - orthopedics consulted; s/p washout 09/25/2024, 09/27/2024 with Dr. Lamar   - discussed with Dr. Lamar- pt will undergo repeat washout on 09/30/2024  - prelim op culture with MRSA   - continue IV Antibiotics as above    - ID consult for abx recommendations - case discussed with Dr. Holman

## 2024-09-29 PROBLEM — E87.1 HYPONATREMIA: Status: RESOLVED | Noted: 2024-09-25 | Resolved: 2024-09-29

## 2024-09-29 LAB
ANION GAP SERPL CALC-SCNC: 8 MMOL/L (ref 8–16)
BACTERIA BLD CULT: ABNORMAL
BACTERIA FLD CULT: ABNORMAL
BASOPHILS # BLD AUTO: 0.04 K/UL (ref 0–0.2)
BASOPHILS NFR BLD: 0.4 % (ref 0–1.9)
BUN SERPL-MCNC: 9 MG/DL (ref 6–20)
CALCIUM SERPL-MCNC: 8.4 MG/DL (ref 8.7–10.5)
CHLORIDE SERPL-SCNC: 105 MMOL/L (ref 95–110)
CO2 SERPL-SCNC: 25 MMOL/L (ref 23–29)
CREAT SERPL-MCNC: 0.7 MG/DL (ref 0.5–1.4)
DIFFERENTIAL METHOD BLD: ABNORMAL
EOSINOPHIL # BLD AUTO: 0.1 K/UL (ref 0–0.5)
EOSINOPHIL NFR BLD: 1.3 % (ref 0–8)
ERYTHROCYTE [DISTWIDTH] IN BLOOD BY AUTOMATED COUNT: 14 % (ref 11.5–14.5)
EST. GFR  (NO RACE VARIABLE): >60 ML/MIN/1.73 M^2
GLUCOSE SERPL-MCNC: 99 MG/DL (ref 70–110)
HCT VFR BLD AUTO: 28.9 % (ref 40–54)
HGB BLD-MCNC: 9.4 G/DL (ref 14–18)
IMM GRANULOCYTES # BLD AUTO: 0.1 K/UL (ref 0–0.04)
IMM GRANULOCYTES NFR BLD AUTO: 1 % (ref 0–0.5)
LYMPHOCYTES # BLD AUTO: 2.2 K/UL (ref 1–4.8)
LYMPHOCYTES NFR BLD: 23.4 % (ref 18–48)
MCH RBC QN AUTO: 28 PG (ref 27–31)
MCHC RBC AUTO-ENTMCNC: 32.5 G/DL (ref 32–36)
MCV RBC AUTO: 86 FL (ref 82–98)
MONOCYTES # BLD AUTO: 0.9 K/UL (ref 0.3–1)
MONOCYTES NFR BLD: 9 % (ref 4–15)
NEUTROPHILS # BLD AUTO: 6.2 K/UL (ref 1.8–7.7)
NEUTROPHILS NFR BLD: 64.9 % (ref 38–73)
NRBC BLD-RTO: 0 /100 WBC
PLATELET # BLD AUTO: 614 K/UL (ref 150–450)
PMV BLD AUTO: 10.7 FL (ref 9.2–12.9)
POTASSIUM SERPL-SCNC: 3.7 MMOL/L (ref 3.5–5.1)
RBC # BLD AUTO: 3.36 M/UL (ref 4.6–6.2)
SODIUM SERPL-SCNC: 138 MMOL/L (ref 136–145)
VANCOMYCIN TROUGH SERPL-MCNC: 9.7 UG/ML (ref 10–22)
WBC # BLD AUTO: 9.54 K/UL (ref 3.9–12.7)

## 2024-09-29 PROCEDURE — 80202 ASSAY OF VANCOMYCIN: CPT | Performed by: INTERNAL MEDICINE

## 2024-09-29 PROCEDURE — 63600175 PHARM REV CODE 636 W HCPCS: Performed by: INTERNAL MEDICINE

## 2024-09-29 PROCEDURE — 80048 BASIC METABOLIC PNL TOTAL CA: CPT | Performed by: INTERNAL MEDICINE

## 2024-09-29 PROCEDURE — 21400001 HC TELEMETRY ROOM

## 2024-09-29 PROCEDURE — 25000003 PHARM REV CODE 250: Performed by: ORTHOPAEDIC SURGERY

## 2024-09-29 PROCEDURE — 85025 COMPLETE CBC W/AUTO DIFF WBC: CPT | Performed by: INTERNAL MEDICINE

## 2024-09-29 PROCEDURE — 27000207 HC ISOLATION

## 2024-09-29 PROCEDURE — 36415 COLL VENOUS BLD VENIPUNCTURE: CPT | Performed by: INTERNAL MEDICINE

## 2024-09-29 PROCEDURE — 25000003 PHARM REV CODE 250: Performed by: INTERNAL MEDICINE

## 2024-09-29 RX ADMIN — VANCOMYCIN HYDROCHLORIDE 1250 MG: 1.25 INJECTION, POWDER, LYOPHILIZED, FOR SOLUTION INTRAVENOUS at 08:09

## 2024-09-29 RX ADMIN — MUPIROCIN: 20 OINTMENT TOPICAL at 09:09

## 2024-09-29 RX ADMIN — MUPIROCIN: 20 OINTMENT TOPICAL at 08:09

## 2024-09-29 RX ADMIN — MORPHINE SULFATE 2 MG: 2 INJECTION, SOLUTION INTRAMUSCULAR; INTRAVENOUS at 10:09

## 2024-09-29 RX ADMIN — VANCOMYCIN HYDROCHLORIDE 1250 MG: 1.25 INJECTION, POWDER, LYOPHILIZED, FOR SOLUTION INTRAVENOUS at 02:09

## 2024-09-29 RX ADMIN — MORPHINE SULFATE 2 MG: 2 INJECTION, SOLUTION INTRAMUSCULAR; INTRAVENOUS at 04:09

## 2024-09-29 RX ADMIN — VANCOMYCIN HYDROCHLORIDE 1250 MG: 1.25 INJECTION, POWDER, LYOPHILIZED, FOR SOLUTION INTRAVENOUS at 10:09

## 2024-09-29 RX ADMIN — OXYCODONE HYDROCHLORIDE 5 MG: 5 TABLET ORAL at 01:09

## 2024-09-29 RX ADMIN — MORPHINE SULFATE 2 MG: 2 INJECTION, SOLUTION INTRAMUSCULAR; INTRAVENOUS at 06:09

## 2024-09-29 RX ADMIN — HEPARIN SODIUM 5000 UNITS: 5000 INJECTION INTRAVENOUS; SUBCUTANEOUS at 05:09

## 2024-09-29 RX ADMIN — MORPHINE SULFATE 2 MG: 2 INJECTION, SOLUTION INTRAMUSCULAR; INTRAVENOUS at 09:09

## 2024-09-29 RX ADMIN — SENNOSIDES AND DOCUSATE SODIUM 1 TABLET: 50; 8.6 TABLET ORAL at 10:09

## 2024-09-29 RX ADMIN — OXYCODONE HYDROCHLORIDE 5 MG: 5 TABLET ORAL at 08:09

## 2024-09-29 RX ADMIN — SENNOSIDES AND DOCUSATE SODIUM 1 TABLET: 50; 8.6 TABLET ORAL at 08:09

## 2024-09-29 NOTE — ASSESSMENT & PLAN NOTE
- MRI concerning for abscess, osteomyelitis and cellulitis of L arm   - orthopedics consulted; s/p washout 09/25/2024, 09/27/2024 with Dr. Lamar   - discussed with Dr. Lamar- pt will undergo repeat washout on 09/30/2024  - prelim op culture with MRSA   - continue IV Antibiotics as above    - ID consult for abx recommendations - ID rec 4 week course of IV Vanc (EOT 10/29/2024)

## 2024-09-29 NOTE — PLAN OF CARE
A244/A244 DAVFerny Daniels is a 38 y.o.male admitted on 9/25/2024 for Septic arthritis of knee, left   Code Status: Full Code MRN: [id number]   Review of patient's allergies indicates:  No Known Allergies  Past Medical History:   Diagnosis Date    Hepatitis C antibody positive in blood 09/25/2024    RNA NEGATIVE    Hyponatremia 09/25/2024    Medical history non-contributory       PRN meds    0.9% NaCl, , PRN  0.9% NaCl, , PRN  acetaminophen, 650 mg, Q4H PRN  dextrose 10%, 12.5 g, PRN  dextrose 10%, 25 g, PRN  glucagon (human recombinant), 1 mg, PRN  glucose, 16 g, PRN  glucose, 24 g, PRN  melatonin, 6 mg, Nightly PRN  morphine, 2 mg, Q4H PRN  naloxone, 0.02 mg, PRN  ondansetron, 4 mg, Q8H PRN  oxyCODONE, 5 mg, Q6H PRN  polyethylene glycol, 17 g, Daily PRN  prochlorperazine, 5 mg, Q6H PRN  sodium chloride 0.9%, 10 mL, Q12H PRN  vancomycin - pharmacy to dose, , pharmacy to manage frequency      Chart check completed. Will continue plan of care.      Orientation: oriented x 4  Jennifer Coma Scale Score: 15     Lead Monitored: Lead II Rhythm: normal sinus rhythm    Cardiac/Telemetry Box Number: [id number]  VTE Required Core Measure: (SCDs) Sequential compression device initiated/maintained Last Bowel Movement: 09/28/24  Diet Adult Regular  Diet NPO     Geraldo Score: 20  Fall Risk Score: 13  Accucheck []   Freq?      Lines/Drains/Airways       Peripheral Intravenous Line  Duration                  Peripheral IV - Single Lumen 09/27/24 0211 20 G Anterior;Proximal;Right Forearm 2 days

## 2024-09-29 NOTE — ASSESSMENT & PLAN NOTE
On Vanco/Cefepime- follow Ortho  Follow final cultures    09/28- cultures - MSSA  Blood cultures- MRSa-  Will plan to treat for up to 4 weeks with IV antibiotics  Will need placement

## 2024-09-29 NOTE — PROGRESS NOTES
[facility] Medicine  Progress Note    Patient Name: Georges Daniels  MRN: [id number]  Patient Class: IP- Inpatient   Admission Date: 9/25/2024  Length of Stay: 4 days  Attending Physician: Nataliya Garza MD  Primary Care Provider: Sally, Primary Doctor        Subjective:     Principal Problem:Septic arthritis of knee, left        HPI:  Pt is a 39 YO  male with PMH notable for IV drug abuse, homelessness who presents to the ED for evaluation of L arm and knee pain. Pt reports that he has been having pain all over, but worse over the L arm and L knee over last day. Denies any recent trauma, injury, falls. Denies injections into joints. Reports feeling feverish over the last 4-5 days. In the ED, pt noted to have swelling to L forearm and L knee. Initial VS: Tmax 102.7, , /84, sats 100% on room air. Initial work up: WBC 15, Na 129, , procal 0.84, UDS + amphetamines. Imaging notable for: large L knee effusion, MRI of LUE showed ulnar shaft chronic fracture with callus formation, fracture incompletely healed, also has 2 loculated fluid collections at margins of callus concerning for osteomyelitis, abscesses and cellulitis. Pt underwent aspiration of L knee in the ED, aspirate yellow/cloudy with 21023 WBC with 89% segs, concerning for septic arthritis. Pt received Vanc + Cefepime in the ED. Orthopedics consulted and planning for washout of L knee and forearm today. Hospital medicine consulted for admission     Overview/Hospital Course:  Underwent washout of L knee and L forearm with ortho Dr. Lamar on 09/25/2024. Blood cultures from admission with staph in 2/2 sets, likely SST source of infection. Remains on IV Vanc + Cefepime. Infectious disease consulted for antibiotics recommendations. As blood cultures showed MRSA, Cefepime discontinued per ID. Underwent repeat I&D of LUE on 09/27 with Dr. Lamar and planned for another I&D on 09/30 per Dr. Lamar    ID  recommend 4 week course of IV Vancomycin with EOT 10/29/2024  Will need LTAC placement for long term IV abx once ortho plans finalized. Not a candidate for home IV infusion as he is homeless and IVDU. Will consult SW to start LTAC process.     Interval History: Tmax 99. Pt reports arm pain is controlled today but having some increased pain to L knee khadijah with ambulation. Denies numbness/tingling to extremities.     Review of Systems  Objective:     Vital Signs (Most Recent):  Temp: 98.9 °F (37.2 °C) (09/29/24 0803)  Pulse: 106 (09/29/24 0824)  Resp: 18 (09/29/24 0937)  BP: 127/78 (09/29/24 0803)  SpO2: 98 % (09/29/24 0803) Vital Signs (24h Range):  Temp:  [97.9 °F (36.6 °C)-99 °F (37.2 °C)] 98.9 °F (37.2 °C)  Pulse:  [] 106  Resp:  [14-20] 18  SpO2:  [98 %-100 %] 98 %  BP: (121-136)/(71-84) 127/78     Weight: 67.9 kg (149 lb 11.1 oz)  Body mass index is 19.75 kg/m².    Intake/Output Summary (Last 24 hours) at 9/29/2024 1111  Last data filed at 9/29/2024 0539  Gross per 24 hour   Intake 752.99 ml   Output 2300 ml   Net -1547.01 ml         Physical Exam  Vitals and nursing note reviewed.   Constitutional:       General: He is not in acute distress.     Appearance: Ill appearance: chronic.   Cardiovascular:      Rate and Rhythm: Normal rate and regular rhythm.   Pulmonary:      Effort: Pulmonary effort is normal.      Breath sounds: Normal breath sounds. No wheezing or rales.   Abdominal:      General: Bowel sounds are normal. There is no distension.      Palpations: Abdomen is soft.      Tenderness: There is no abdominal tenderness.   Musculoskeletal:      Right lower leg: No edema.   Skin:     Comments: LUE AND LLE DRESSINGS IN PLACE    Neurological:      Mental Status: He is alert and oriented to person, place, and time.             Significant Labs: All pertinent labs within the past 24 hours have been reviewed.    Significant Imaging: I have reviewed all pertinent imaging results/findings within the past 24  "hours.    Assessment/Plan:      * Septic arthritis of knee, left  - s/p aspiration in the ED with 90233 WBC, 89% polys  - orthopedics consulted- s/p washout of L knee 09/25 with Dr. Lamar   - Continue empiric IV Vanc ; vanc dosing and monitoring per pharmacy  - aspirate  cultures  NGTD   - ID consulted and recommend IV Vanc x 4 weeks (EOT 10/29)   - pain control with PO Roxicodone and IV mOrphine as needed  - continue PT/OT         Abscess of left arm  - MRI concerning for abscess, osteomyelitis and cellulitis of L arm   - orthopedics consulted; s/p washout 09/25/2024, 09/27/2024 with Dr. Lamar   - discussed with Dr. Lamar- pt will undergo repeat washout on 09/30/2024  - Npo at [facility]; hold subq heparin ppx   - prelim op culture with MRSA   - continue IV Antibiotics as above    - ID consult for abx recommendations - ID rec 4 week course of IV Vanc (EOT 10/29/2024)     Sepsis  This patient does have evidence of infective focus  My overall impression is sepsis.  Source: Skin and Soft Tissue (location L arm and L knee )  Antibiotics given-   Antibiotics (72h ago, onward)      Start     Stop Route Frequency Ordered    09/28/24 1145  mupirocin 2 % ointment         10/03/24 0859 Nasl 2 times daily 09/28/24 1034    09/27/24 1700  vancomycin 1,500 mg in D5W 250 mL IVPB (admixture device)         -- IV Every 12 hours (non-standard times) 09/27/24 1631    09/25/24 1013  vancomycin - pharmacy to dose  (vancomycin IVPB (PEDS and ADULTS))        Placed in "And" Linked Group    -- IV pharmacy to manage frequency 09/25/24 1014          Latest lactate reviewed-  Recent Labs   Lab 09/25/24  1310   LACTATE 0.8       Organ dysfunction indicated by  None     Fluid challenge s/p sepsis fluids in the Ed      Post- resuscitation assessment No - Post resuscitation assessment not needed       Will Not start Pressors   Source control achieved by: IV antibiotics, surgical washout     Staphylococcus aureus bacteremia  - likely skin/soft " tissue source  - repeat blood cultures 09/27 NGTD   - TTE 09/26 with no vegetations noted   - ID consult for abx recommendations   - Continue IV Vancomycin       Hepatitis C antibody test positive  - confirmatory test pending, likely in setting of IVDA       IV drug abuse  Prior hx of IVDA, UDS on admission + for methamphetamines   Complicating management of above conditions   Will need LTAC placement for IV abx on discharge; sw following         VTE Risk Mitigation (From admission, onward)           Ordered     Reason for No Pharmacological VTE Prophylaxis  Once        Question:  Reasons:  Answer:  Physician Provided (leave comment)  Comment:  surgical intervention planned    09/25/24 1434     IP VTE HIGH RISK PATIENT  Once         09/25/24 1434     Place sequential compression device  Until discontinued         09/25/24 1434                    Discharge Planning   HAYLEE:      Code Status: Full Code   Is the patient medically ready for discharge?:     Reason for patient still in hospital (select all that apply): Patient trending condition, Treatment, Consult recommendations, and Pending disposition  Discharge Plan A: Jesus Garza MD  Department of Hospital Medicine   [facility] - Telemetry ([facility])

## 2024-09-29 NOTE — ASSESSMENT & PLAN NOTE
TTE -did not show vegetation  Isolate is MRSA- will stop Cefepime in AM  Continue Vanco  Will follow repeat blood culture in AM  09/28-  Outpatient Antibiotic Therapy Plan:     Please send referral to [facility].     1) Infection:  MRSA bacteremia/septic arthritis      2) Discharge Antibiotics: IV Vancomycin-pharmacy to dose      Intravenous antibiotics:     3) Therapy Duration:  4 weeks      Estimated end date of IV antibiotics: 10/29/24     4) Outpatient Weekly Labs:     Order the following labs to be drawn on Mondays:   CBC  CMP   CPK (when on Daptomycin)  ESR  CRP     Please send all labs to [facility] .    5) Outpatient Infectious Diseases Follow-up

## 2024-09-29 NOTE — PROGRESS NOTES
[facility])  Infectious Disease  Progress Note    Patient Name: Georges Daniels  MRN: [id number]  Admission Date: 9/25/2024  Length of Stay: 4 days  Attending Physician: Nataliya Garza MD  Primary Care Provider: Sally, Primary Doctor    Isolation Status: Contact  Assessment/Plan:      Psychiatric  IV drug abuse  Needs drug cessation counseling     ID  * Septic arthritis of knee, left  S/p washout of L knee and L forearm with ortho Dr. Lamar on 09/25/2024.   Prelim cultures -[facility]   On Vanco/Cefepime -  Will use cultures to adjust regime    09/28- will continue Vancomycin - this can be out patient regime if he goes to LTAC    Staphylococcus aureus bacteremia  TTE -did not show vegetation  Isolate is MRSA- will stop Cefepime in AM  Continue Vanco  Will follow repeat blood culture in AM  09/28-  Outpatient Antibiotic Therapy Plan:     Please send referral to [facility].     1) Infection:  MRSA bacteremia/septic arthritis      2) Discharge Antibiotics: IV Vancomycin-pharmacy to dose      Intravenous antibiotics:     3) Therapy Duration:  4 weeks      Estimated end date of IV antibiotics: 10/29/24     4) Outpatient Weekly Labs:     Order the following labs to be drawn on Mondays:   CBC  CMP   CPK (when on Daptomycin)  ESR  CRP     Please send all labs to [facility] .    5) Outpatient Infectious Diseases Follow-up          Abscess of left arm  On Vanco/Cefepime- follow Ortho  Follow final cultures    09/28- cultures - MSSA  Blood cultures- MRSa-  Will plan to treat for up to 4 weeks with IV antibiotics  Will need placement         Anticipated Disposition:     Thank you for your consult. I will follow-up with patient. Please contact us if you have any additional questions.    Panda Holman MD, [facility]  Infectious Disease  [facility])    Subjective:     Principal Problem:Septic arthritis of knee, left    HPI: 38 year old man with PMH notable for IV drug abuse, homelessness who  was admitted for left  arm and knee pain.   There was associated history of fever . T max 102.7 ..  Labs and imaging test -   WBC 15, UDS + amphetamines. Imaging notable for: large L knee effusion, MRI of LUE showed ulnar shaft chronic fracture with callus formation, fracture incompletely healed, also has 2 loculated fluid collections at margins of callus concerning for osteomyelitis, abscesses and cellulitis.   Body fluid culture- 09/25-  Body Fluid Culture, Sterile Gram stain: Gram positive cocci in clusters resembling Staph 09/26/2024     Blood culture- 09/25- [facility]   [facility]-09/25- no veg  Interval History:   38 year old man with MRSA bacteremia  Cultures from left arm - MSSA      Review of Systems   Constitutional:  Negative for activity change, appetite change, chills and diaphoresis.   Neurological:  Negative for dizziness, facial asymmetry and headaches.     Objective:     Vital Signs (Most Recent):  Temp: 99 °F (37.2 °C) (09/29/24 0408)  Pulse: 98 (09/29/24 0410)  Resp: 18 (09/29/24 0410)  BP: 126/71 (09/29/24 0408)  SpO2: 98 % (09/29/24 0408) Vital Signs (24h Range):  Temp:  [97.9 °F (36.6 °C)-99 °F (37.2 °C)] 99 °F (37.2 °C)  Pulse:  [71-99] 98  Resp:  [14-20] 18  SpO2:  [98 %-100 %] 98 %  BP: (121-136)/(71-84) 126/71     Weight: 67.9 kg (149 lb 11.1 oz)  Body mass index is 19.75 kg/m².    Estimated Creatinine Clearance: 157.2 mL/min (based on SCr of 0.6 mg/dL).     Physical Exam  Vitals and nursing note reviewed.   HENT:      Head: Normocephalic.   Cardiovascular:      Rate and Rhythm: Normal rate.   Pulmonary:      Effort: Pulmonary effort is normal.   Musculoskeletal:      Comments: Dressing noted   Skin:     Findings: Lesion present.   Neurological:      Mental Status: He is alert.          Significant Labs: Blood Culture:   Recent Labs   Lab 09/25/24  0901 09/25/24  0903 09/27/24  0500 09/27/24  0506   LABBLOO Gram stain aer bottle: Gram positive cocci in clusters resembling Staph  Gram stain altaf bottle:  Gram positive cocci in clusters resembling Staph  Results called to and read back by: Marimar Valle RN 09/26/2024  06:27  STAPHYLOCOCCUS AUREUS  ID consult required at [facility].  For susceptibility see order #[id number]  * Gram stain aer bottle: Gram positive cocci in clusters resembling Staph  Gram stain altaf bottle: Gram positive cocci in clusters resembling Staph  Results called to and read back by: Marimar Valle RN 09/26/2024  06:27  STAPHYLOCOCCUS AUREUS  Susceptibility pending  ID consult required at [facility].  * No Growth to date  No Growth to date No Growth to date  No Growth to date     CBC:   Recent Labs   Lab 09/28/24  0524   WBC 11.03   HGB 9.4*   HCT 28.2*   *     CMP:   Recent Labs   Lab 09/28/24  0524      K 3.7      CO2 25   *   BUN 12   CREATININE 0.6   CALCIUM 8.5*   ANIONGAP 6*     Wound Culture:   Recent Labs   Lab 09/25/24  1746 09/25/24  1803 09/25/24  1812 09/25/24  1847 09/27/24  0759   LABAERO STAPHYLOCOCCUS AUREUS  Rare  Susceptibility pending  * No growth No growth  No growth STAPHYLOCOCCUS AUREUS  Few  For susceptibility see order #[id number]  *  STAPHYLOCOCCUS AUREUS  Few  * No growth     All pertinent labs within the past 24 hours have been reviewed.    Significant Imaging: I have reviewed all pertinent imaging results/findings within the past 24 hours.

## 2024-09-29 NOTE — ASSESSMENT & PLAN NOTE
S/p washout of L knee and L forearm with ortho Dr. Lamar on 09/25/2024.   Prelim cultures -[facility]   On Vanco/Cefepime -  Will use cultures to adjust regime    09/28- will continue Vancomycin - this can be out patient regime if he goes to LTAC

## 2024-09-29 NOTE — PLAN OF CARE
Problem: Adult Inpatient Plan of Care  Goal: Plan of Care Review  Outcome: Progressing  Goal: Patient-Specific Goal (Individualized)  Outcome: Progressing  Goal: Absence of Hospital-Acquired Illness or Injury  Outcome: Progressing  Goal: Optimal Comfort and Wellbeing  Outcome: Progressing  Goal: Readiness for Transition of Care  Outcome: Progressing     Problem: Sepsis/Septic Shock  Goal: Optimal Coping  Outcome: Progressing  Goal: Absence of Bleeding  Outcome: Progressing  Goal: Blood Glucose Level Within Targeted Range  Outcome: Progressing  Goal: Absence of Infection Signs and Symptoms  Outcome: Progressing  Goal: Optimal Nutrition Intake  Outcome: Progressing     Problem: Wound  Goal: Optimal Coping  Outcome: Progressing  Goal: Optimal Functional Ability  Outcome: Progressing  Goal: Absence of Infection Signs and Symptoms  Outcome: Progressing  Goal: Improved Oral Intake  Outcome: Progressing  Goal: Optimal Pain Control and Function  Outcome: Progressing  Goal: Skin Health and Integrity  Outcome: Progressing  Goal: Optimal Wound Healing  Outcome: Progressing     Problem: Infection  Goal: Absence of Infection Signs and Symptoms  Outcome: Progressing     Problem: Fall Injury Risk  Goal: Absence of Fall and Fall-Related Injury  Outcome: Progressing     Problem: Skin Injury Risk Increased  Goal: Skin Health and Integrity  Outcome: Progressing

## 2024-09-29 NOTE — SUBJECTIVE & OBJECTIVE
Interval History: Tmax 99. Pt reports arm pain is controlled today but having some increased pain to L knee khadijah with ambulation. Denies numbness/tingling to extremities.     Review of Systems  Objective:     Vital Signs (Most Recent):  Temp: 98.9 °F (37.2 °C) (09/29/24 0803)  Pulse: 106 (09/29/24 0824)  Resp: 18 (09/29/24 0937)  BP: 127/78 (09/29/24 0803)  SpO2: 98 % (09/29/24 0803) Vital Signs (24h Range):  Temp:  [97.9 °F (36.6 °C)-99 °F (37.2 °C)] 98.9 °F (37.2 °C)  Pulse:  [] 106  Resp:  [14-20] 18  SpO2:  [98 %-100 %] 98 %  BP: (121-136)/(71-84) 127/78     Weight: 67.9 kg (149 lb 11.1 oz)  Body mass index is 19.75 kg/m².    Intake/Output Summary (Last 24 hours) at 9/29/2024 1111  Last data filed at 9/29/2024 0539  Gross per 24 hour   Intake 752.99 ml   Output 2300 ml   Net -1547.01 ml         Physical Exam  Vitals and nursing note reviewed.   Constitutional:       General: He is not in acute distress.     Appearance: Ill appearance: chronic.   Cardiovascular:      Rate and Rhythm: Normal rate and regular rhythm.   Pulmonary:      Effort: Pulmonary effort is normal.      Breath sounds: Normal breath sounds. No wheezing or rales.   Abdominal:      General: Bowel sounds are normal. There is no distension.      Palpations: Abdomen is soft.      Tenderness: There is no abdominal tenderness.   Musculoskeletal:      Right lower leg: No edema.   Skin:     Comments: LUE AND LLE DRESSINGS IN PLACE    Neurological:      Mental Status: He is alert and oriented to person, place, and time.             Significant Labs: All pertinent labs within the past 24 hours have been reviewed.    Significant Imaging: I have reviewed all pertinent imaging results/findings within the past 24 hours.

## 2024-09-29 NOTE — SUBJECTIVE & OBJECTIVE
Interval History:   38 year old man with MRSA bacteremia  Cultures from left arm - MSSA      Review of Systems   Constitutional:  Negative for activity change, appetite change, chills and diaphoresis.   Neurological:  Negative for dizziness, facial asymmetry and headaches.     Objective:     Vital Signs (Most Recent):  Temp: 99 °F (37.2 °C) (09/29/24 0408)  Pulse: 98 (09/29/24 0410)  Resp: 18 (09/29/24 0410)  BP: 126/71 (09/29/24 0408)  SpO2: 98 % (09/29/24 0408) Vital Signs (24h Range):  Temp:  [97.9 °F (36.6 °C)-99 °F (37.2 °C)] 99 °F (37.2 °C)  Pulse:  [71-99] 98  Resp:  [14-20] 18  SpO2:  [98 %-100 %] 98 %  BP: (121-136)/(71-84) 126/71     Weight: 67.9 kg (149 lb 11.1 oz)  Body mass index is 19.75 kg/m².    Estimated Creatinine Clearance: 157.2 mL/min (based on SCr of 0.6 mg/dL).     Physical Exam  Vitals and nursing note reviewed.   HENT:      Head: Normocephalic.   Cardiovascular:      Rate and Rhythm: Normal rate.   Pulmonary:      Effort: Pulmonary effort is normal.   Musculoskeletal:      Comments: Dressing noted   Skin:     Findings: Lesion present.   Neurological:      Mental Status: He is alert.          Significant Labs: Blood Culture:   Recent Labs   Lab 09/25/24  0901 09/25/24  0903 09/27/24  0500 09/27/24  0506   LABBLOO Gram stain aer bottle: Gram positive cocci in clusters resembling Staph  Gram stain altaf bottle: Gram positive cocci in clusters resembling Staph  Results called to and read back by: Marimar Valle RN 09/26/2024  06:27  STAPHYLOCOCCUS AUREUS  [facility]  For susceptibility see order #[id number]  * Gram stain aer bottle: Gram positive cocci in clusters resembling Staph  Gram stain altaf bottle: Gram positive cocci in clusters resembling Staph  Results called to and read back by: Marimar Valle RN 09/26/2024  06:27  STAPHYLOCOCCUS AUREUS  Susceptibility pending  ID consult required at [facility] [place]  locations.  * No Growth to date  No Growth to date No Growth to date  No Growth to date     CBC:   Recent Labs   Lab 09/28/24 0524   WBC 11.03   HGB 9.4*   HCT 28.2*   *     CMP:   Recent Labs   Lab 09/28/24 0524      K 3.7      CO2 25   *   BUN 12   CREATININE 0.6   CALCIUM 8.5*   ANIONGAP 6*     Wound Culture:   Recent Labs   Lab 09/25/24  1746 09/25/24  1803 09/25/24  1812 09/25/24  1847 09/27/24  0759   LABAERO STAPHYLOCOCCUS AUREUS  Rare  Susceptibility pending  * No growth No growth  No growth STAPHYLOCOCCUS AUREUS  Few  For susceptibility see order #[id number]  *  STAPHYLOCOCCUS AUREUS  Few  * No growth     All pertinent labs within the past 24 hours have been reviewed.    Significant Imaging: I have reviewed all pertinent imaging results/findings within the past 24 hours.

## 2024-09-29 NOTE — PROGRESS NOTES
Pharmacokinetic Assessment Follow Up: IV Vancomycin    Vancomycin serum concentration assessment(s):    The trough level was drawn correctly and can be used to guide therapy at this time. The measurement is below the desired definitive target range of 15 to 20 mcg/mL.    Vancomycin Regimen Plan:    Change regimen to Vancomycin 1250 mg IV every 8 hours with next serum trough concentration measured at 0630 prior to 4th dose on 09/30    Drug levels (last 3 results):  Recent Labs   Lab Result Units 09/27/24  1524 09/29/24  0503   Vancomycin-Trough ug/mL 10.9 9.7*       Pharmacy will continue to follow and monitor vancomycin.    Please contact pharmacy at extension [phone] for questions regarding this assessment.    Thank you for the consult,   Elvira Graves       Patient brief summary:  Georges Daniels is a 38 y.o. male initiated on antimicrobial therapy with IV Vancomycin for treatment of bone/joint infection    The patient's current regimen is vancomycin 1250 mg IV every 8 hours.     Drug Allergies:   Review of patient's allergies indicates:  No Known Allergies    Actual Body Weight:   97.9 kg    Renal Function:   Estimated Creatinine Clearance: 134.7 mL/min (based on SCr of 0.7 mg/dL).,     Dialysis Method (if applicable):  N/A    CBC (last 72 hours):  Recent Labs   Lab Result Units 09/27/24  0500 09/28/24  0524 09/29/24  0503   WBC K/uL 9.67 11.03 9.54   Hemoglobin g/dL 9.7* 9.4* 9.4*   Hematocrit % 29.9* 28.2* 28.9*   Platelets K/uL 431 517* 614*   Gran % % 76.9* 76.1* 64.9   Lymph % % 13.7* 15.4* 23.4   Mono % % 8.2 6.9 9.0   Eosinophil % % 0.5 0.6 1.3   Basophil % % 0.2 0.3 0.4   Differential Method  Automated Automated Automated       Metabolic Panel (last 72 hours):  Recent Labs   Lab Result Units 09/27/24  0500 09/28/24  0524 09/29/24  0503   Sodium mmol/L 133* 137 138   Potassium mmol/L 3.7 3.7 3.7   Chloride mmol/L 103 106 105   CO2 mmol/L 25 25 25   Glucose mg/dL 101 130* 99   BUN mg/dL 6 12 9    Creatinine mg/dL 0.6 0.6 0.7       Vancomycin Administrations:  vancomycin given in the last 96 hours                     vancomycin 1,500 mg in D5W 250 mL IVPB (admixture device) ()  Restarted 09/28/24 1637     1,500 mg New Bag  1632      Restarted  0455     1,500 mg New Bag  0407     1,500 mg New Bag 09/27/24 1711    vancomycin (VANCOCIN) 1,000 mg in D5W 250 mL IVPB (mg) 1,000 mg New Bag 09/27/24 0747    vancomycin (VANCOCIN) 1,000 mg in D5W 250 mL IVPB (admixture device) (mg) 1,000 mg New Bag 09/27/24 0446     1,000 mg New Bag 09/26/24 1715     1,000 mg New Bag  0502    vancomycin 1.75 g in 5 % dextrose 500 mL IVPB (mg) 1,750 mg New Bag 09/25/24 1352                    Microbiologic Results:  Microbiology Results (last 7 days)       Procedure Component Value Units Date/Time    Aerobic culture [[id number]] Collected: 09/27/24 0759    Order Status: Completed Specimen: Wound from Arm, Left Updated: 09/29/24 0544     Aerobic Bacterial Culture No growth    Narrative:      Left Forearm    AFB Culture & Smear [[id number]] Collected: 09/27/24 0759    Order Status: Completed Specimen: Wound from Arm, Left Updated: 09/28/24 2127     AFB Culture & Smear Culture in progress    Narrative:      Left Forearm    AFB Culture & Smear [[id number]] Collected: 09/27/24 0759    Order Status: Completed Specimen: Wound from Arm, Left Updated: 09/28/24 2127     AFB Culture & Smear Culture in progress    Narrative:      Left Ulna    Culture, Anaerobe [[id number]] Collected: 09/27/24 0759    Order Status: Completed Specimen: Wound from Arm, Left Updated: 09/28/24 1425     Anaerobic Culture Culture in progress    Narrative:      Left Ulna    Aerobic culture [[id number]]  (Abnormal) Collected: 09/25/24 1847    Order Status: Completed Specimen: Wound from Arm, Left Updated: 09/28/24 1324     Aerobic Bacterial Culture STAPHYLOCOCCUS AUREUS  Few  For susceptibility see order #[id number]      Narrative:      Left forearm soft tissue    Aerobic  culture [[id number]]  (Abnormal)  (Susceptibility) Collected: 09/25/24 1847    Order Status: Completed Specimen: Wound from Arm, Left Updated: 09/28/24 1323     Aerobic Bacterial Culture STAPHYLOCOCCUS AUREUS  Few      Narrative:      Left forearm deep tissue    Blood culture [[id number]] Collected: 09/27/24 0506    Order Status: Completed Specimen: Blood Updated: 09/28/24 1222     Blood Culture, Routine No Growth to date      No Growth to date    Blood culture [[id number]] Collected: 09/27/24 0500    Order Status: Completed Specimen: Blood Updated: 09/28/24 1222     Blood Culture, Routine No Growth to date      No Growth to date    Aerobic culture [[id number]]  (Abnormal) Collected: 09/25/24 1746    Order Status: Completed Specimen: Wound from Knee, Left Updated: 09/28/24 1215     Aerobic Bacterial Culture STAPHYLOCOCCUS AUREUS  Rare  Susceptibility pending      Blood culture x two cultures. Draw prior to antibiotics. [[id number]]  (Abnormal) Collected: 09/25/24 0901    Order Status: Completed Specimen: Blood from Peripheral, Forearm, Right Updated: 09/28/24 0944     Blood Culture, Routine Gram stain aer bottle: Gram positive cocci in clusters resembling Staph      Gram stain altaf bottle: Gram positive cocci in clusters resembling Staph      Results called to and read back by: Marimar Valle RN 09/26/2024  06:27      STAPHYLOCOCCUS AUREUS  ID consult required at [facility],[place] and [facility] locations.  For susceptibility see order #[id number]      Narrative:      Aerobic and anaerobic    Blood culture x two cultures. Draw prior to antibiotics. [[id number]]  (Abnormal) Collected: 09/25/24 0903    Order Status: Completed Specimen: Blood from Peripheral, Antecubital, Left Updated: 09/28/24 0944     Blood Culture, Routine Gram stain aer bottle: Gram positive cocci in clusters resembling Staph      Gram stain altaf bottle: Gram positive cocci in clusters resembling Staph      Results called to and read back by: Marimar  EVITA Valle 09/26/2024  06:27      STAPHYLOCOCCUS AUREUS  Susceptibility pending  ID consult required at [facility],[place] and [facility] locations.      Narrative:      Aerobic and anaerobic    Culture, Body Fluid - Bactec [[id number]]  (Abnormal) Collected: 09/25/24 1009    Order Status: Completed Specimen: Joint Fluid from Knee, Left Updated: 09/28/24 0943     Body Fluid Culture, Sterile Gram stain: Gram positive cocci in clusters resembling Staph 09/26/2024      20:56      STAPHYLOCOCCUS AUREUS  Susceptibility pending      Aerobic culture [[id number]] Collected: 09/27/24 0759    Order Status: Completed Specimen: Wound from Arm, Left Updated: 09/28/24 0817     Aerobic Bacterial Culture No growth    Narrative:      Left Ulna    Gram stain [[id number]] Collected: 09/27/24 0759    Order Status: Completed Specimen: Wound from Arm, Left Updated: 09/28/24 0335     Gram Stain Result No WBC's      No organisms seen    Narrative:      Left Forearm    Gram stain [[id number]] Collected: 09/27/24 0759    Order Status: Completed Specimen: Wound from Arm, Left Updated: 09/27/24 2132     Gram Stain Result No WBC's      No organisms seen    Narrative:      Left Ulna    Fungus culture [[id number]] Collected: 09/27/24 0759    Order Status: Sent Specimen: Wound from Arm, Left Updated: 09/27/24 1700    Culture, Anaerobe [[id number]] Collected: 09/27/24 0759    Order Status: Sent Specimen: Wound from Arm, Left Updated: 09/27/24 1700    Fungus culture [[id number]] Collected: 09/27/24 0759    Order Status: Sent Specimen: Wound from Arm, Left Updated: 09/27/24 1700    AFB Culture & Smear [[id number]] Collected: 09/25/24 1847    Order Status: Completed Specimen: Tissue from Arm, Left Updated: 09/27/24 0927     AFB Culture & Smear Culture in progress     AFB CULTURE STAIN No acid fast bacilli seen.    Narrative:      Left forearm deep tissue    AFB Culture & Smear [[id number]] Collected: 09/25/24 1746    Order Status: Completed Specimen:  Joint Fluid from Knee, Left Updated: 09/27/24 0927     AFB Culture & Smear Culture in progress     AFB CULTURE STAIN No acid fast bacilli seen.    AFB Culture & Smear [[id number]] Collected: 09/25/24 1803    Order Status: Completed Specimen: Wound from Arm, Left Updated: 09/27/24 0927     AFB Culture & Smear Culture in progress     AFB CULTURE STAIN No acid fast bacilli seen.    Narrative:      Left forearm superficial    AFB Culture & Smear [[id number]] Collected: 09/25/24 1812    Order Status: Completed Specimen: Wound from Arm, Left Updated: 09/27/24 0927     AFB Culture & Smear Culture in progress     AFB CULTURE STAIN No acid fast bacilli seen.    Narrative:      Left forearm near callous    AFB Culture & Smear [[id number]] Collected: 09/25/24 1847    Order Status: Completed Specimen: Tissue from Arm, Left Updated: 09/27/24 0927     AFB Culture & Smear Culture in progress     AFB CULTURE STAIN No acid fast bacilli seen.    Narrative:      Left forearm soft tissue    AFB Culture & Smear [[id number]] Collected: 09/25/24 1812    Order Status: Completed Specimen: Wound from Arm, Left Updated: 09/27/24 0927     AFB Culture & Smear Culture in progress     AFB CULTURE STAIN No acid fast bacilli seen.    Narrative:      Left ulna    AFB Culture & Smear [[id number]] Collected: 09/25/24 1009    Order Status: Completed Specimen: Joint Fluid from Knee, Left Updated: 09/27/24 0927     AFB Culture & Smear Culture in progress     AFB CULTURE STAIN No acid fast bacilli seen.    Culture, Anaerobe [[id number]] Collected: 09/25/24 1746    Order Status: Completed Specimen: Joint Fluid from Knee, Left Updated: 09/27/24 0749     Anaerobic Culture Culture in progress    Culture, Anaerobe [[id number]] Collected: 09/25/24 1847    Order Status: Completed Specimen: Tissue from Arm, Left Updated: 09/27/24 0749     Anaerobic Culture Culture in progress    Narrative:      Left forearm deep tissue    Culture, Anaerobe [[id number]] Collected:  09/25/24 1847    Order Status: Completed Specimen: Tissue from Arm, Left Updated: 09/27/24 0749     Anaerobic Culture Culture in progress    Narrative:      Left forearm soft tissue    Culture, Anaerobe [[id number]] Collected: 09/25/24 1812    Order Status: Completed Specimen: Wound from Arm, Left Updated: 09/27/24 0749     Anaerobic Culture Culture in progress    Narrative:      Left forearm near callous    Culture, Anaerobe [[id number]] Collected: 09/25/24 1812    Order Status: Completed Specimen: Wound from Arm, Left Updated: 09/27/24 0749     Anaerobic Culture Culture in progress    Narrative:      Left ulna    Culture, Anaerobe [[id number]] Collected: 09/25/24 1803    Order Status: Completed Specimen: Wound from Arm, Left Updated: 09/27/24 0748     Anaerobic Culture Culture in progress    Narrative:      Left forearm superficial    Aerobic culture [[id number]] Collected: 09/25/24 1812    Order Status: Completed Specimen: Wound from Arm, Left Updated: 09/27/24 0731     Aerobic Bacterial Culture No growth    Narrative:      Left forearm near callous    Aerobic culture [[id number]] Collected: 09/25/24 1812    Order Status: Completed Specimen: Wound from Arm, Left Updated: 09/27/24 0731     Aerobic Bacterial Culture No growth    Narrative:      Left ulna    Aerobic culture [[id number]] Collected: 09/25/24 1803    Order Status: Completed Specimen: Wound from Arm, Left Updated: 09/27/24 0731     Aerobic Bacterial Culture No growth    Narrative:      Left forearm superficial    Gram stain [[id number]] Collected: 09/25/24 1746    Order Status: Completed Specimen: Joint Fluid from Knee, Left Updated: 09/26/24 0930     Gram Stain Result Many WBC's      No organisms seen    Gram stain [[id number]] Collected: 09/25/24 1812    Order Status: Completed Specimen: Wound from Arm, Left Updated: 09/26/24 0919     Gram Stain Result No WBC's      No organisms seen    Narrative:      Left ulna    Gram stain [[id number]] Collected:  09/25/24 1847    Order Status: Completed Specimen: Tissue from Arm, Left Updated: 09/26/24 0914     Gram Stain Result Rare WBC's      No organisms seen    Narrative:      Left forearm soft tissue    Gram stain [[id number]] Collected: 09/25/24 1812    Order Status: Completed Specimen: Wound from Arm, Left Updated: 09/26/24 0807     Gram Stain Result No WBC's      No organisms seen    Narrative:      Left forearm near callous    Gram stain [[id number]] Collected: 09/25/24 1847    Order Status: Completed Specimen: Tissue from Arm, Left Updated: 09/26/24 0802     Gram Stain Result Rare WBC's      Rare Gram positive cocci    Narrative:      Left forearm deep tissue    Gram stain [[id number]] Collected: 09/25/24 1803    Order Status: Completed Specimen: Wound from Arm, Left Updated: 09/26/24 0800     Gram Stain Result Rare WBC's      No organisms seen    Narrative:      Left forearm superficial    MRSA/SA Rapid ID by PCR from Blood culture [[id number]]  (Abnormal) Collected: 09/25/24 0903    Order Status: Completed Updated: 09/26/24 0754     Staph aureus ID by PCR Positive     Methicillin Resistant ID by PCR Positive    Narrative:      Aerobic and anaerobic    Fungus culture [[id number]] Collected: 09/25/24 1847    Order Status: Sent Specimen: Tissue from Arm, Left Updated: 09/26/24 0155    Fungus culture [[id number]] Collected: 09/25/24 1847    Order Status: Sent Specimen: Tissue from Arm, Left Updated: 09/26/24 0155    Fungus culture [[id number]] Collected: 09/25/24 1746    Order Status: Sent Specimen: Joint Fluid from Knee, Left Updated: 09/26/24 0155    Fungus culture [[id number]] Collected: 09/25/24 1803    Order Status: Sent Specimen: Wound from Arm, Left Updated: 09/26/24 0155    Fungus culture [[id number]] Collected: 09/25/24 1812    Order Status: Sent Specimen: Wound from Arm, Left Updated: 09/26/24 0155    Fungus culture [[id number]] Collected: 09/25/24 1812    Order Status: Sent Specimen: Wound from Arm, Left  Updated: 09/26/24 0155    Fungus culture [[id number]] Collected: 09/25/24 1009    Order Status: Sent Specimen: Joint Fluid from Knee, Left Updated: 09/25/24 2138    Culture, Anaerobe [[id number]] Collected: 09/25/24 1813    Order Status: Sent Specimen: Wound from Arm, Left Updated: 09/25/24 1813    AFB Culture & Smear [[id number]] Collected: 09/25/24 1813    Order Status: Sent Specimen: Wound from Arm, Left Updated: 09/25/24 1813    Gram stain [[id number]] Collected: 09/25/24 1813    Order Status: Sent Specimen: Wound from Arm, Left Updated: 09/25/24 1813    Fungus culture [[id number]] Collected: 09/25/24 1813    Order Status: Sent Specimen: Wound from Arm, Left Updated: 09/25/24 1813    Aerobic culture [[id number]] Collected: 09/25/24 1813    Order Status: Sent Specimen: Wound from Arm, Left Updated: 09/25/24 1813    Gram stain [[id number]] Collected: 09/25/24 1009    Order Status: Completed Specimen: Joint Fluid from Knee, Left Updated: 09/25/24 1133     Gram Stain Result Many WBC's      No epithelial cells      No organisms seen    Influenza A & B by Molecular [[id number]] Collected: 09/25/24 0938    Order Status: Completed Specimen: Nasopharyngeal Swab Updated: 09/25/24 1012     Influenza A, Molecular Negative     Influenza B, Molecular Negative     Flu A & B Source Nasal swab

## 2024-09-29 NOTE — ASSESSMENT & PLAN NOTE
Hyponatremia is likely due to Dehydration/hypovolemia. The patient's most recent sodium results are listed below.  Recent Labs     09/27/24  0500 09/28/24  0524 09/29/24  0503   * 137 138       Plan  - Correct the sodium by 4-6mEq in 24 hours.   - Will treat the hyponatremia with IV fluids as follows: s/p NS at 75/hr   - Monitor sodium Daily.   - Patient hyponatremia is resolved  - likely hypovolemic in setting of sepsis

## 2024-09-29 NOTE — ASSESSMENT & PLAN NOTE
- s/p aspiration in the ED with [id number] WBC, 89% polys  - orthopedics consulted- s/p washout of L knee 09/25 with Dr. Lamar   - Continue empiric IV Vanc ; vanc dosing and monitoring per pharmacy  - aspirate  cultures  NGTD   - ID consulted and recommend IV Vanc x 4 weeks (EOT 10/29)   - pain control with PO Roxicodone and IV mOrphine as needed  - continue PT/OT

## 2024-09-30 ENCOUNTER — ANESTHESIA (OUTPATIENT)
Dept: SURGERY | Facility: HOSPITAL | Age: 39
DRG: 854 | End: 2024-09-30
Payer: MEDICAID

## 2024-09-30 ENCOUNTER — ANESTHESIA EVENT (OUTPATIENT)
Dept: SURGERY | Facility: HOSPITAL | Age: 39
DRG: 854 | End: 2024-09-30
Payer: MEDICAID

## 2024-09-30 LAB
ACID FAST MOD KINY STN SPEC: NORMAL
ACID FAST MOD KINY STN SPEC: NORMAL
ANION GAP SERPL CALC-SCNC: 7 MMOL/L (ref 8–16)
BACTERIA SPEC AEROBE CULT: ABNORMAL
BACTERIA SPEC AEROBE CULT: NO GROWTH
BACTERIA SPEC ANAEROBE CULT: NORMAL
BASOPHILS # BLD AUTO: 0.1 K/UL (ref 0–0.2)
BASOPHILS NFR BLD: 0.8 % (ref 0–1.9)
BUN SERPL-MCNC: 8 MG/DL (ref 6–20)
CALCIUM SERPL-MCNC: 9.1 MG/DL (ref 8.7–10.5)
CHLORIDE SERPL-SCNC: 101 MMOL/L (ref 95–110)
CO2 SERPL-SCNC: 25 MMOL/L (ref 23–29)
CREAT SERPL-MCNC: 0.7 MG/DL (ref 0.5–1.4)
DIFFERENTIAL METHOD BLD: ABNORMAL
EOSINOPHIL # BLD AUTO: 0.2 K/UL (ref 0–0.5)
EOSINOPHIL NFR BLD: 1.4 % (ref 0–8)
ERYTHROCYTE [DISTWIDTH] IN BLOOD BY AUTOMATED COUNT: 14.5 % (ref 11.5–14.5)
EST. GFR  (NO RACE VARIABLE): >60 ML/MIN/1.73 M^2
FINAL PATHOLOGIC DIAGNOSIS: NORMAL
GLUCOSE SERPL-MCNC: 91 MG/DL (ref 70–110)
GROSS: NORMAL
HCT VFR BLD AUTO: 33.3 % (ref 40–54)
HGB BLD-MCNC: 10.6 G/DL (ref 14–18)
IMM GRANULOCYTES # BLD AUTO: 0.23 K/UL (ref 0–0.04)
IMM GRANULOCYTES NFR BLD AUTO: 1.9 % (ref 0–0.5)
LYMPHOCYTES # BLD AUTO: 3.9 K/UL (ref 1–4.8)
LYMPHOCYTES NFR BLD: 32.5 % (ref 18–48)
Lab: NORMAL
MCH RBC QN AUTO: 28 PG (ref 27–31)
MCHC RBC AUTO-ENTMCNC: 31.8 G/DL (ref 32–36)
MCV RBC AUTO: 88 FL (ref 82–98)
MONOCYTES # BLD AUTO: 1.1 K/UL (ref 0.3–1)
MONOCYTES NFR BLD: 9.4 % (ref 4–15)
MYCOBACTERIUM SPEC QL CULT: NORMAL
MYCOBACTERIUM SPEC QL CULT: NORMAL
NEUTROPHILS # BLD AUTO: 6.4 K/UL (ref 1.8–7.7)
NEUTROPHILS NFR BLD: 54 % (ref 38–73)
NRBC BLD-RTO: 0 /100 WBC
PLATELET # BLD AUTO: 706 K/UL (ref 150–450)
PMV BLD AUTO: 10.4 FL (ref 9.2–12.9)
POTASSIUM SERPL-SCNC: 4.4 MMOL/L (ref 3.5–5.1)
RBC # BLD AUTO: 3.78 M/UL (ref 4.6–6.2)
SODIUM SERPL-SCNC: 133 MMOL/L (ref 136–145)
VANCOMYCIN TROUGH SERPL-MCNC: 15 UG/ML (ref 10–22)
WBC # BLD AUTO: 11.86 K/UL (ref 3.9–12.7)

## 2024-09-30 PROCEDURE — 99233 SBSQ HOSP IP/OBS HIGH 50: CPT | Mod: NSCH,,, | Performed by: INTERNAL MEDICINE

## 2024-09-30 PROCEDURE — 29877 ARTHRS KNEE SURG DBRDMT/SHVG: CPT | Mod: LT,,, | Performed by: ORTHOPAEDIC SURGERY

## 2024-09-30 PROCEDURE — 0SBD4ZZ EXCISION OF LEFT KNEE JOINT, PERCUTANEOUS ENDOSCOPIC APPROACH: ICD-10-PCS | Performed by: ORTHOPAEDIC SURGERY

## 2024-09-30 PROCEDURE — 27000207 HC ISOLATION

## 2024-09-30 PROCEDURE — 25000003 PHARM REV CODE 250: Performed by: INTERNAL MEDICINE

## 2024-09-30 PROCEDURE — 27201423 OPTIME MED/SURG SUP & DEVICES STERILE SUPPLY: Performed by: ORTHOPAEDIC SURGERY

## 2024-09-30 PROCEDURE — 80202 ASSAY OF VANCOMYCIN: CPT | Performed by: INTERNAL MEDICINE

## 2024-09-30 PROCEDURE — 37000008 HC ANESTHESIA 1ST 15 MINUTES: Performed by: ORTHOPAEDIC SURGERY

## 2024-09-30 PROCEDURE — 87205 SMEAR GRAM STAIN: CPT | Mod: 59 | Performed by: STUDENT IN AN ORGANIZED HEALTH CARE EDUCATION/TRAINING PROGRAM

## 2024-09-30 PROCEDURE — 21400001 HC TELEMETRY ROOM

## 2024-09-30 PROCEDURE — 87075 CULTR BACTERIA EXCEPT BLOOD: CPT | Performed by: STUDENT IN AN ORGANIZED HEALTH CARE EDUCATION/TRAINING PROGRAM

## 2024-09-30 PROCEDURE — 87070 CULTURE OTHR SPECIMN AEROBIC: CPT | Performed by: STUDENT IN AN ORGANIZED HEALTH CARE EDUCATION/TRAINING PROGRAM

## 2024-09-30 PROCEDURE — 63600175 PHARM REV CODE 636 W HCPCS: Performed by: NURSE ANESTHETIST, CERTIFIED REGISTERED

## 2024-09-30 PROCEDURE — 36000711: Performed by: ORTHOPAEDIC SURGERY

## 2024-09-30 PROCEDURE — 25000003 PHARM REV CODE 250: Performed by: ORTHOPAEDIC SURGERY

## 2024-09-30 PROCEDURE — 11044 DBRDMT BONE 1ST 20 SQ CM/<: CPT | Mod: 59,51,, | Performed by: ORTHOPAEDIC SURGERY

## 2024-09-30 PROCEDURE — 87102 FUNGUS ISOLATION CULTURE: CPT | Performed by: STUDENT IN AN ORGANIZED HEALTH CARE EDUCATION/TRAINING PROGRAM

## 2024-09-30 PROCEDURE — 80048 BASIC METABOLIC PNL TOTAL CA: CPT | Performed by: INTERNAL MEDICINE

## 2024-09-30 PROCEDURE — 99024 POSTOP FOLLOW-UP VISIT: CPT | Mod: ,,, | Performed by: ORTHOPAEDIC SURGERY

## 2024-09-30 PROCEDURE — 37000009 HC ANESTHESIA EA ADD 15 MINS: Performed by: ORTHOPAEDIC SURGERY

## 2024-09-30 PROCEDURE — 87116 MYCOBACTERIA CULTURE: CPT | Performed by: STUDENT IN AN ORGANIZED HEALTH CARE EDUCATION/TRAINING PROGRAM

## 2024-09-30 PROCEDURE — 25000003 PHARM REV CODE 250: Performed by: NURSE ANESTHETIST, CERTIFIED REGISTERED

## 2024-09-30 PROCEDURE — 71000033 HC RECOVERY, INTIAL HOUR: Performed by: ORTHOPAEDIC SURGERY

## 2024-09-30 PROCEDURE — 25000003 PHARM REV CODE 250

## 2024-09-30 PROCEDURE — 36000710: Performed by: ORTHOPAEDIC SURGERY

## 2024-09-30 PROCEDURE — 63600175 PHARM REV CODE 636 W HCPCS

## 2024-09-30 PROCEDURE — 0XBF0ZZ EXCISION OF LEFT LOWER ARM, OPEN APPROACH: ICD-10-PCS | Performed by: ORTHOPAEDIC SURGERY

## 2024-09-30 PROCEDURE — 85025 COMPLETE CBC W/AUTO DIFF WBC: CPT | Performed by: INTERNAL MEDICINE

## 2024-09-30 PROCEDURE — 87206 SMEAR FLUORESCENT/ACID STAI: CPT | Performed by: STUDENT IN AN ORGANIZED HEALTH CARE EDUCATION/TRAINING PROGRAM

## 2024-09-30 PROCEDURE — 87205 SMEAR GRAM STAIN: CPT | Performed by: STUDENT IN AN ORGANIZED HEALTH CARE EDUCATION/TRAINING PROGRAM

## 2024-09-30 PROCEDURE — 63600175 PHARM REV CODE 636 W HCPCS: Performed by: INTERNAL MEDICINE

## 2024-09-30 PROCEDURE — 63600175 PHARM REV CODE 636 W HCPCS: Performed by: ORTHOPAEDIC SURGERY

## 2024-09-30 PROCEDURE — 63600175 PHARM REV CODE 636 W HCPCS: Performed by: ANESTHESIOLOGY

## 2024-09-30 RX ORDER — FENTANYL CITRATE 50 UG/ML
25 INJECTION, SOLUTION INTRAMUSCULAR; INTRAVENOUS EVERY 5 MIN PRN
Status: DISCONTINUED | OUTPATIENT
Start: 2024-09-30 | End: 2024-09-30

## 2024-09-30 RX ORDER — ROCURONIUM BROMIDE 10 MG/ML
INJECTION, SOLUTION INTRAVENOUS
Status: DISCONTINUED | OUTPATIENT
Start: 2024-09-30 | End: 2024-09-30

## 2024-09-30 RX ORDER — EPINEPHRINE 1 MG/ML
INJECTION, SOLUTION, CONCENTRATE INTRAVENOUS
Status: DISCONTINUED | OUTPATIENT
Start: 2024-09-30 | End: 2024-09-30 | Stop reason: HOSPADM

## 2024-09-30 RX ORDER — MIDAZOLAM HYDROCHLORIDE 1 MG/ML
INJECTION INTRAMUSCULAR; INTRAVENOUS
Status: DISCONTINUED | OUTPATIENT
Start: 2024-09-30 | End: 2024-09-30

## 2024-09-30 RX ORDER — FENTANYL CITRATE 50 UG/ML
INJECTION, SOLUTION INTRAMUSCULAR; INTRAVENOUS
Status: DISCONTINUED | OUTPATIENT
Start: 2024-09-30 | End: 2024-09-30

## 2024-09-30 RX ORDER — LIDOCAINE HYDROCHLORIDE 20 MG/ML
INJECTION, SOLUTION EPIDURAL; INFILTRATION; INTRACAUDAL; PERINEURAL
Status: DISCONTINUED | OUTPATIENT
Start: 2024-09-30 | End: 2024-09-30

## 2024-09-30 RX ORDER — ONDANSETRON HYDROCHLORIDE 2 MG/ML
INJECTION, SOLUTION INTRAVENOUS
Status: DISCONTINUED | OUTPATIENT
Start: 2024-09-30 | End: 2024-09-30

## 2024-09-30 RX ORDER — DEXMEDETOMIDINE HYDROCHLORIDE 100 UG/ML
INJECTION, SOLUTION INTRAVENOUS
Status: DISCONTINUED | OUTPATIENT
Start: 2024-09-30 | End: 2024-09-30

## 2024-09-30 RX ORDER — MEPERIDINE HYDROCHLORIDE 25 MG/ML
12.5 INJECTION INTRAMUSCULAR; INTRAVENOUS; SUBCUTANEOUS ONCE AS NEEDED
Status: DISCONTINUED | OUTPATIENT
Start: 2024-09-30 | End: 2024-09-30

## 2024-09-30 RX ORDER — ONDANSETRON HYDROCHLORIDE 2 MG/ML
4 INJECTION, SOLUTION INTRAVENOUS DAILY PRN
Status: DISCONTINUED | OUTPATIENT
Start: 2024-09-30 | End: 2024-09-30

## 2024-09-30 RX ORDER — OXYCODONE AND ACETAMINOPHEN 5; 325 MG/1; MG/1
1 TABLET ORAL
Status: DISCONTINUED | OUTPATIENT
Start: 2024-09-30 | End: 2024-09-30

## 2024-09-30 RX ORDER — HYDROMORPHONE HYDROCHLORIDE 1 MG/ML
0.2 INJECTION, SOLUTION INTRAMUSCULAR; INTRAVENOUS; SUBCUTANEOUS EVERY 5 MIN PRN
Status: DISCONTINUED | OUTPATIENT
Start: 2024-09-30 | End: 2024-09-30

## 2024-09-30 RX ORDER — PHENYLEPHRINE HYDROCHLORIDE 10 MG/ML
INJECTION INTRAVENOUS
Status: DISCONTINUED | OUTPATIENT
Start: 2024-09-30 | End: 2024-09-30

## 2024-09-30 RX ORDER — ONDANSETRON HYDROCHLORIDE 2 MG/ML
4 INJECTION, SOLUTION INTRAVENOUS ONCE AS NEEDED
Status: DISCONTINUED | OUTPATIENT
Start: 2024-09-30 | End: 2024-09-30

## 2024-09-30 RX ORDER — PROPOFOL 10 MG/ML
VIAL (ML) INTRAVENOUS
Status: DISCONTINUED | OUTPATIENT
Start: 2024-09-30 | End: 2024-09-30

## 2024-09-30 RX ORDER — SUCCINYLCHOLINE CHLORIDE 20 MG/ML
INJECTION INTRAMUSCULAR; INTRAVENOUS
Status: DISCONTINUED | OUTPATIENT
Start: 2024-09-30 | End: 2024-09-30

## 2024-09-30 RX ADMIN — MIDAZOLAM HYDROCHLORIDE 2 MG: 1 INJECTION, SOLUTION INTRAMUSCULAR; INTRAVENOUS at 02:09

## 2024-09-30 RX ADMIN — ONDANSETRON 4 MG: 2 INJECTION INTRAMUSCULAR; INTRAVENOUS at 03:09

## 2024-09-30 RX ADMIN — VANCOMYCIN HYDROCHLORIDE 1250 MG: 1.25 INJECTION, POWDER, LYOPHILIZED, FOR SOLUTION INTRAVENOUS at 08:09

## 2024-09-30 RX ADMIN — MORPHINE SULFATE 2 MG: 2 INJECTION, SOLUTION INTRAMUSCULAR; INTRAVENOUS at 06:09

## 2024-09-30 RX ADMIN — VANCOMYCIN HYDROCHLORIDE 1250 MG: 1.25 INJECTION, POWDER, LYOPHILIZED, FOR SOLUTION INTRAVENOUS at 07:09

## 2024-09-30 RX ADMIN — DEXMEDETOMIDINE 4 MCG: 200 INJECTION, SOLUTION INTRAVENOUS at 03:09

## 2024-09-30 RX ADMIN — MUPIROCIN: 20 OINTMENT TOPICAL at 09:09

## 2024-09-30 RX ADMIN — LIDOCAINE HYDROCHLORIDE 50 MG: 20 INJECTION, SOLUTION EPIDURAL; INFILTRATION; INTRACAUDAL; PERINEURAL at 02:09

## 2024-09-30 RX ADMIN — HYDROMORPHONE HYDROCHLORIDE 0.2 MG: 1 INJECTION, SOLUTION INTRAMUSCULAR; INTRAVENOUS; SUBCUTANEOUS at 05:09

## 2024-09-30 RX ADMIN — PROPOFOL 150 MG: 10 INJECTION, EMULSION INTRAVENOUS at 02:09

## 2024-09-30 RX ADMIN — FENTANYL CITRATE 50 MCG: 50 INJECTION, SOLUTION INTRAMUSCULAR; INTRAVENOUS at 02:09

## 2024-09-30 RX ADMIN — SUCCINYLCHOLINE CHLORIDE 120 MG: 20 INJECTION, SOLUTION INTRAMUSCULAR; INTRAVENOUS; PARENTERAL at 02:09

## 2024-09-30 RX ADMIN — PROPOFOL 20 MG: 10 INJECTION, EMULSION INTRAVENOUS at 04:09

## 2024-09-30 RX ADMIN — ROCURONIUM BROMIDE 5 MG: 10 SOLUTION INTRAVENOUS at 02:09

## 2024-09-30 RX ADMIN — MORPHINE SULFATE 2 MG: 2 INJECTION, SOLUTION INTRAMUSCULAR; INTRAVENOUS at 10:09

## 2024-09-30 RX ADMIN — HYDROMORPHONE HYDROCHLORIDE 0.2 MG: 1 INJECTION, SOLUTION INTRAMUSCULAR; INTRAVENOUS; SUBCUTANEOUS at 04:09

## 2024-09-30 RX ADMIN — DEXMEDETOMIDINE 4 MCG: 200 INJECTION, SOLUTION INTRAVENOUS at 02:09

## 2024-09-30 RX ADMIN — MORPHINE SULFATE 2 MG: 2 INJECTION, SOLUTION INTRAMUSCULAR; INTRAVENOUS at 03:09

## 2024-09-30 RX ADMIN — SENNOSIDES AND DOCUSATE SODIUM 1 TABLET: 50; 8.6 TABLET ORAL at 09:09

## 2024-09-30 RX ADMIN — MORPHINE SULFATE 2 MG: 2 INJECTION, SOLUTION INTRAMUSCULAR; INTRAVENOUS at 09:09

## 2024-09-30 RX ADMIN — MUPIROCIN: 20 OINTMENT TOPICAL at 08:09

## 2024-09-30 RX ADMIN — SENNOSIDES AND DOCUSATE SODIUM 1 TABLET: 50; 8.6 TABLET ORAL at 08:09

## 2024-09-30 RX ADMIN — SODIUM CHLORIDE, SODIUM LACTATE, POTASSIUM CHLORIDE, AND CALCIUM CHLORIDE: .6; .31; .03; .02 INJECTION, SOLUTION INTRAVENOUS at 02:09

## 2024-09-30 RX ADMIN — OXYCODONE HYDROCHLORIDE 5 MG: 5 TABLET ORAL at 08:09

## 2024-09-30 RX ADMIN — PHENYLEPHRINE HYDROCHLORIDE 100 MCG: 10 INJECTION INTRAVENOUS at 03:09

## 2024-09-30 RX ADMIN — DEXMEDETOMIDINE 4 MCG: 200 INJECTION, SOLUTION INTRAVENOUS at 04:09

## 2024-09-30 NOTE — TRANSFER OF CARE
"Anesthesia Transfer of Care Note    Patient: Georges Daniels    Procedure(s) Performed: Procedure(s) (LRB):  IRRIGATION AND DEBRIDEMENT, UPPER EXTREMITY (Left)  ARTHROSCOPY, KNEE, WITH LAVAGE AND DRAINAGE, FOR INFECTION (Left)    Patient location: PACU    Anesthesia Type: general    Transport from OR: Transported from OR on room air with adequate spontaneous ventilation    Post pain: adequate analgesia    Post assessment: no apparent anesthetic complications and tolerated procedure well    Post vital signs: stable    Level of consciousness: responds to stimulation and sedated    Nausea/Vomiting: no nausea/vomiting    Complications: none    Transfer of care protocol was followedComments: Report given to PACU RN at bedside. Hand off tool used. RN given opportunity to ask questions or clarify concerns. No Concerns verbalized. RN was asked if ready to assume care of patient. RN verbally confirmed. Pt. left in stable condition. SV. Vital Signs Return to Near Baseline. No s/s of distress noted.       Last vitals: Visit Vitals  /62 (BP Location: Right arm, Patient Position: Lying)   Pulse 91   Temp 36.7 °C (98.1 °F) (Oral)   Resp 18   Ht 6' 1" (1.854 m)   Wt 67.9 kg (149 lb 11.1 oz)   SpO2 98%   BMI 19.75 kg/m²     "

## 2024-09-30 NOTE — PROGRESS NOTES
Pharmacokinetic Assessment Follow Up: IV Vancomycin    Vancomycin serum concentration assessment(s):    The trough level was drawn correctly and can be used to guide therapy at this time. The measurement is within the desired definitive target range of 15 to 20 mcg/mL.    Vancomycin Regimen Plan:    Continue regimen to Vancomycin 1250 mg IV every 8 hours with next serum trough concentration measured at 60 min prior to 4th dose on 10/01 @0630    Drug levels (last 3 results):  Recent Labs   Lab Result Units 09/27/24  1524 09/29/24  0503 09/30/24  0633   Vancomycin-Trough ug/mL 10.9 9.7* 15.0       Pharmacy will continue to follow and monitor vancomycin.    Please contact pharmacy at extension [phone]  for questions regarding this assessment.    Thank you for the consult,   Mer Sawyer       Patient brief summary:  Georges Daniels is a 38 y.o. male initiated on antimicrobial therapy with IV Vancomycin for treatment of bone/joint infection    The patient's current regimen is Vancomycin 1250 mg IV every 8 hours    Drug Allergies:   Review of patient's allergies indicates:  No Known Allergies    Actual Body Weight:   67.9 kg    Renal Function:   Estimated Creatinine Clearance: 134.7 mL/min (based on SCr of 0.7 mg/dL).,     Dialysis Method (if applicable):  N/A    CBC (last 72 hours):  Recent Labs   Lab Result Units 09/28/24  0524 09/29/24  0503 09/30/24  0632   WBC K/uL 11.03 9.54 11.86   Hemoglobin g/dL 9.4* 9.4* 10.6*   Hematocrit % 28.2* 28.9* 33.3*   Platelets K/uL 517* 614* 706*   Gran % % 76.1* 64.9 54.0   Lymph % % 15.4* 23.4 32.5   Mono % % 6.9 9.0 9.4   Eosinophil % % 0.6 1.3 1.4   Basophil % % 0.3 0.4 0.8   Differential Method  Automated Automated Automated       Metabolic Panel (last 72 hours):  Recent Labs   Lab Result Units 09/28/24  0524 09/29/24  0503 09/30/24  0632   Sodium mmol/L 137 138 133*   Potassium mmol/L 3.7 3.7 4.4   Chloride mmol/L 106 105 101   CO2 mmol/L 25 25 25   Glucose mg/dL  130* 99 91   BUN mg/dL 12 9 8   Creatinine mg/dL 0.6 0.7 0.7       Vancomycin Administrations:  vancomycin given in the last 96 hours                     vancomycin 1,250 mg in D5W 250 mL IVPB (admixture device) (mg) 1,250 mg New Bag 09/30/24 0734     1,250 mg New Bag 09/29/24 2252     1,250 mg New Bag  1432     1,250 mg New Bag  0814    vancomycin 1,500 mg in D5W 250 mL IVPB (admixture device) ()  Restarted 09/28/24 1637     1,500 mg New Bag  1632      Restarted  0455     1,500 mg New Bag  0407     1,500 mg New Bag 09/27/24 1711    vancomycin (VANCOCIN) 1,000 mg in D5W 250 mL IVPB (mg) 1,000 mg New Bag 09/27/24 0747    vancomycin (VANCOCIN) 1,000 mg in D5W 250 mL IVPB (admixture device) (mg) 1,000 mg New Bag 09/27/24 0446     1,000 mg New Bag 09/26/24 1715                    Microbiologic Results:  Microbiology Results (last 7 days)       Procedure Component Value Units Date/Time    Aerobic culture [[id number]]  (Abnormal)  (Susceptibility) Collected: 09/25/24 1746    Order Status: Completed Specimen: Wound from Knee, Left Updated: 09/30/24 0655     Aerobic Bacterial Culture STAPHYLOCOCCUS AUREUS  Rare      Culture, Anaerobe [[id number]] Collected: 09/27/24 0759    Order Status: Completed Specimen: Wound from Arm, Left Updated: 09/29/24 1232     Anaerobic Culture Culture in progress    Narrative:      Left Forearm    Blood culture [[id number]] Collected: 09/27/24 0506    Order Status: Completed Specimen: Blood Updated: 09/29/24 1222     Blood Culture, Routine No Growth to date      No Growth to date      No Growth to date    Blood culture [[id number]] Collected: 09/27/24 0500    Order Status: Completed Specimen: Blood Updated: 09/29/24 1222     Blood Culture, Routine No Growth to date      No Growth to date      No Growth to date    Blood culture x two cultures. Draw prior to antibiotics. [[id number]]  (Abnormal) Collected: 09/25/24 0901    Order Status: Completed Specimen: Blood from Peripheral, Forearm, Right  Updated: 09/29/24 0920     Blood Culture, Routine Gram stain aer bottle: Gram positive cocci in clusters resembling Staph      Gram stain altaf bottle: Gram positive cocci in clusters resembling Staph      Results called to and read back by: Marimar Valle RN 09/26/2024  06:27      STAPHYLOCOCCUS AUREUS  ID consult required at [facility].  For susceptibility see order #[id number]      Narrative:      Aerobic and anaerobic    Blood culture x two cultures. Draw prior to antibiotics. [[id number]]  (Abnormal)  (Susceptibility) Collected: 09/25/24 0903    Order Status: Completed Specimen: Blood from Peripheral, Antecubital, Left Updated: 09/29/24 0919     Blood Culture, Routine Gram stain aer bottle: Gram positive cocci in clusters resembling Staph      Gram stain altaf bottle: Gram positive cocci in clusters resembling Staph      Results called to and read back by: Marimar Valle RN 09/26/2024  06:27      STAPHYLOCOCCUS AUREUS  ID consult required at [facility].      Narrative:      Aerobic and anaerobic    Culture, Body Fluid - Bactec [[id number]]  (Abnormal)  (Susceptibility) Collected: 09/25/24 1009    Order Status: Completed Specimen: Joint Fluid from Knee, Left Updated: 09/29/24 0919     Body Fluid Culture, Sterile Gram stain: Gram positive cocci in clusters resembling Staph 09/26/2024      20:56      STAPHYLOCOCCUS AUREUS    Aerobic culture [[id number]] Collected: 09/27/24 0759    Order Status: Completed Specimen: Wound from Arm, Left Updated: 09/29/24 0544     Aerobic Bacterial Culture No growth    Narrative:      Left Forearm    AFB Culture & Smear [[id number]] Collected: 09/27/24 0759    Order Status: Completed Specimen: Wound from Arm, Left Updated: 09/28/24 2127     AFB Culture & Smear Culture in progress    Narrative:      Left Forearm    AFB Culture & Smear [[id number]] Collected: 09/27/24 0759    Order Status: Completed Specimen: Wound from Arm,  Left Updated: 09/28/24 2127     AFB Culture & Smear Culture in progress    Narrative:      Left Ulna    Culture, Anaerobe [[id number]] Collected: 09/27/24 0759    Order Status: Completed Specimen: Wound from Arm, Left Updated: 09/28/24 1425     Anaerobic Culture Culture in progress    Narrative:      Left Ulna    Aerobic culture [[id number]]  (Abnormal) Collected: 09/25/24 1847    Order Status: Completed Specimen: Wound from Arm, Left Updated: 09/28/24 1324     Aerobic Bacterial Culture STAPHYLOCOCCUS AUREUS  Few  For susceptibility see order #[id number]      Narrative:      Left forearm soft tissue    Aerobic culture [[id number]]  (Abnormal)  (Susceptibility) Collected: 09/25/24 1847    Order Status: Completed Specimen: Wound from Arm, Left Updated: 09/28/24 1323     Aerobic Bacterial Culture STAPHYLOCOCCUS AUREUS  Few      Narrative:      Left forearm deep tissue    Aerobic culture [[id number]] Collected: 09/27/24 0759    Order Status: Completed Specimen: Wound from Arm, Left Updated: 09/28/24 0817     Aerobic Bacterial Culture No growth    Narrative:      Left Ulna    Gram stain [[id number]] Collected: 09/27/24 0759    Order Status: Completed Specimen: Wound from Arm, Left Updated: 09/28/24 0335     Gram Stain Result No WBC's      No organisms seen    Narrative:      Left Forearm    Gram stain [[id number]] Collected: 09/27/24 0759    Order Status: Completed Specimen: Wound from Arm, Left Updated: 09/27/24 2132     Gram Stain Result No WBC's      No organisms seen    Narrative:      Left Ulna    Fungus culture [[id number]] Collected: 09/27/24 0759    Order Status: Sent Specimen: Wound from Arm, Left Updated: 09/27/24 1700    Fungus culture [[id number]] Collected: 09/27/24 0759    Order Status: Sent Specimen: Wound from Arm, Left Updated: 09/27/24 1700    AFB Culture & Smear [[id number]] Collected: 09/25/24 1847    Order Status: Completed Specimen: Tissue from Arm, Left Updated: 09/27/24 0927     AFB Culture &  Smear Culture in progress     AFB CULTURE STAIN No acid fast bacilli seen.    Narrative:      Left forearm deep tissue    AFB Culture & Smear [[id number]] Collected: 09/25/24 1746    Order Status: Completed Specimen: Joint Fluid from Knee, Left Updated: 09/27/24 0927     AFB Culture & Smear Culture in progress     AFB CULTURE STAIN No acid fast bacilli seen.    AFB Culture & Smear [[id number]] Collected: 09/25/24 1803    Order Status: Completed Specimen: Wound from Arm, Left Updated: 09/27/24 0927     AFB Culture & Smear Culture in progress     AFB CULTURE STAIN No acid fast bacilli seen.    Narrative:      Left forearm superficial    AFB Culture & Smear [[id number]] Collected: 09/25/24 1812    Order Status: Completed Specimen: Wound from Arm, Left Updated: 09/27/24 0927     AFB Culture & Smear Culture in progress     AFB CULTURE STAIN No acid fast bacilli seen.    Narrative:      Left forearm near callous    AFB Culture & Smear [[id number]] Collected: 09/25/24 1847    Order Status: Completed Specimen: Tissue from Arm, Left Updated: 09/27/24 0927     AFB Culture & Smear Culture in progress     AFB CULTURE STAIN No acid fast bacilli seen.    Narrative:      Left forearm soft tissue    AFB Culture & Smear [[id number]] Collected: 09/25/24 1812    Order Status: Completed Specimen: Wound from Arm, Left Updated: 09/27/24 0927     AFB Culture & Smear Culture in progress     AFB CULTURE STAIN No acid fast bacilli seen.    Narrative:      Left ulna    AFB Culture & Smear [[id number]] Collected: 09/25/24 1009    Order Status: Completed Specimen: Joint Fluid from Knee, Left Updated: 09/27/24 0927     AFB Culture & Smear Culture in progress     AFB CULTURE STAIN No acid fast bacilli seen.    Culture, Anaerobe [[id number]] Collected: 09/25/24 1746    Order Status: Completed Specimen: Joint Fluid from Knee, Left Updated: 09/27/24 0749     Anaerobic Culture Culture in progress    Culture, Anaerobe [[id number]] Collected: 09/25/24  1847    Order Status: Completed Specimen: Tissue from Arm, Left Updated: 09/27/24 0749     Anaerobic Culture Culture in progress    Narrative:      Left forearm deep tissue    Culture, Anaerobe [[id number]] Collected: 09/25/24 1847    Order Status: Completed Specimen: Tissue from Arm, Left Updated: 09/27/24 0749     Anaerobic Culture Culture in progress    Narrative:      Left forearm soft tissue    Culture, Anaerobe [[id number]] Collected: 09/25/24 1812    Order Status: Completed Specimen: Wound from Arm, Left Updated: 09/27/24 0749     Anaerobic Culture Culture in progress    Narrative:      Left forearm near callous    Culture, Anaerobe [[id number]] Collected: 09/25/24 1812    Order Status: Completed Specimen: Wound from Arm, Left Updated: 09/27/24 0749     Anaerobic Culture Culture in progress    Narrative:      Left ulna    Culture, Anaerobe [[id number]] Collected: 09/25/24 1803    Order Status: Completed Specimen: Wound from Arm, Left Updated: 09/27/24 0748     Anaerobic Culture Culture in progress    Narrative:      Left forearm superficial    Aerobic culture [[id number]] Collected: 09/25/24 1812    Order Status: Completed Specimen: Wound from Arm, Left Updated: 09/27/24 0731     Aerobic Bacterial Culture No growth    Narrative:      Left forearm near callous    Aerobic culture [[id number]] Collected: 09/25/24 1812    Order Status: Completed Specimen: Wound from Arm, Left Updated: 09/27/24 0731     Aerobic Bacterial Culture No growth    Narrative:      Left ulna    Aerobic culture [[id number]] Collected: 09/25/24 1803    Order Status: Completed Specimen: Wound from Arm, Left Updated: 09/27/24 0731     Aerobic Bacterial Culture No growth    Narrative:      Left forearm superficial    Gram stain [[id number]] Collected: 09/25/24 1746    Order Status: Completed Specimen: Joint Fluid from Knee, Left Updated: 09/26/24 0930     Gram Stain Result Many WBC's      No organisms seen    Gram stain [[id number]] Collected:  09/25/24 1812    Order Status: Completed Specimen: Wound from Arm, Left Updated: 09/26/24 0919     Gram Stain Result No WBC's      No organisms seen    Narrative:      Left ulna    Gram stain [[id number]] Collected: 09/25/24 1847    Order Status: Completed Specimen: Tissue from Arm, Left Updated: 09/26/24 0914     Gram Stain Result Rare WBC's      No organisms seen    Narrative:      Left forearm soft tissue    Gram stain [[id number]] Collected: 09/25/24 1812    Order Status: Completed Specimen: Wound from Arm, Left Updated: 09/26/24 0807     Gram Stain Result No WBC's      No organisms seen    Narrative:      Left forearm near callous    Gram stain [[id number]] Collected: 09/25/24 1847    Order Status: Completed Specimen: Tissue from Arm, Left Updated: 09/26/24 0802     Gram Stain Result Rare WBC's      Rare Gram positive cocci    Narrative:      Left forearm deep tissue    Gram stain [[id number]] Collected: 09/25/24 1803    Order Status: Completed Specimen: Wound from Arm, Left Updated: 09/26/24 0800     Gram Stain Result Rare WBC's      No organisms seen    Narrative:      Left forearm superficial    MRSA/SA Rapid ID by PCR from Blood culture [[id number]]  (Abnormal) Collected: 09/25/24 0903    Order Status: Completed Updated: 09/26/24 0754     Staph aureus ID by PCR Positive     Methicillin Resistant ID by PCR Positive    Narrative:      Aerobic and anaerobic    Fungus culture [[id number]] Collected: 09/25/24 1847    Order Status: Sent Specimen: Tissue from Arm, Left Updated: 09/26/24 0155    Fungus culture [[id number]] Collected: 09/25/24 1847    Order Status: Sent Specimen: Tissue from Arm, Left Updated: 09/26/24 0155    Fungus culture [[id number]] Collected: 09/25/24 1746    Order Status: Sent Specimen: Joint Fluid from Knee, Left Updated: 09/26/24 0155    Fungus culture [[id number]] Collected: 09/25/24 1803    Order Status: Sent Specimen: Wound from Arm, Left Updated: 09/26/24 0155    Fungus culture  [[id number]] Collected: 09/25/24 1812    Order Status: Sent Specimen: Wound from Arm, Left Updated: 09/26/24 0155    Fungus culture [[id number]] Collected: 09/25/24 1812    Order Status: Sent Specimen: Wound from Arm, Left Updated: 09/26/24 0155    Fungus culture [[id number]] Collected: 09/25/24 1009    Order Status: Sent Specimen: Joint Fluid from Knee, Left Updated: 09/25/24 2138    Culture, Anaerobe [[id number]] Collected: 09/25/24 1813    Order Status: Sent Specimen: Wound from Arm, Left Updated: 09/25/24 1813    AFB Culture & Smear [[id number]] Collected: 09/25/24 1813    Order Status: Sent Specimen: Wound from Arm, Left Updated: 09/25/24 1813    Gram stain [[id number]] Collected: 09/25/24 1813    Order Status: Sent Specimen: Wound from Arm, Left Updated: 09/25/24 1813    Fungus culture [[id number]] Collected: 09/25/24 1813    Order Status: Sent Specimen: Wound from Arm, Left Updated: 09/25/24 1813    Aerobic culture [[id number]] Collected: 09/25/24 1813    Order Status: Sent Specimen: Wound from Arm, Left Updated: 09/25/24 1813    Gram stain [[id number]] Collected: 09/25/24 1009    Order Status: Completed Specimen: Joint Fluid from Knee, Left Updated: 09/25/24 1133     Gram Stain Result Many WBC's      No epithelial cells      No organisms seen    Influenza A & B by Molecular [[id number]] Collected: 09/25/24 0938    Order Status: Completed Specimen: Nasopharyngeal Swab Updated: 09/25/24 1012     Influenza A, Molecular Negative     Influenza B, Molecular Negative     Flu A & B Source Nasal swab

## 2024-09-30 NOTE — PLAN OF CARE
Problem: Adult Inpatient Plan of Care  Goal: Plan of Care Review  Outcome: Progressing  Goal: Patient-Specific Goal (Individualized)  Outcome: Progressing  Goal: Absence of Hospital-Acquired Illness or Injury  Outcome: Progressing  Goal: Optimal Comfort and Wellbeing  Outcome: Progressing  Goal: Readiness for Transition of Care  Outcome: Progressing     Problem: Sepsis/Septic Shock  Goal: Optimal Coping  Outcome: Progressing  Goal: Absence of Bleeding  Outcome: Progressing  Goal: Blood Glucose Level Within Targeted Range  Outcome: Progressing  Goal: Absence of Infection Signs and Symptoms  Outcome: Progressing  Goal: Optimal Nutrition Intake  Outcome: Progressing     Problem: Wound  Goal: Optimal Coping  Outcome: Progressing  Goal: Optimal Functional Ability  Outcome: Progressing  Goal: Absence of Infection Signs and Symptoms  Outcome: Progressing  Goal: Improved Oral Intake  Outcome: Progressing  Goal: Optimal Pain Control and Function  Outcome: Progressing  Goal: Skin Health and Integrity  Outcome: Progressing  Goal: Optimal Wound Healing  Outcome: Progressing     Problem: Infection  Goal: Absence of Infection Signs and Symptoms  Outcome: Progressing     Problem: Fall Injury Risk  Goal: Absence of Fall and Fall-Related Injury  Outcome: Progressing     Problem: Skin Injury Risk Increased  Goal: Skin Health and Integrity  Outcome: Progressing     Pt did well through night, partner stayed with patient through night. NPO past MN for I/d wash out. IV to ac remains intact. Neuro intact - moves all extremities. Pt did get up and shower preparing for surgical procedure.

## 2024-09-30 NOTE — PLAN OF CARE
Pt resting on stretcher s/p I&D lt knee with scope and I&D lt fa performed under general anesthesia by Dr. Lamar. Respirations even and unlabored on room air with O2 sats of 99%. VSS. Will cont plan of care.

## 2024-09-30 NOTE — PROGRESS NOTES
[facility] Medicine  Progress Note    Patient Name: Georges Daniels  MRN: [id number]  Patient Class: IP- Inpatient   Admission Date: 9/25/2024  Length of Stay: 5 days  Attending Physician: Yousuf Gómez MD  Primary Care Provider: Sally, Primary Doctor        Subjective:     Principal Problem:Septic arthritis of knee, left        HPI:  Pt is a 39 YO  male with PMH notable for IV drug abuse, homelessness who presents to the ED for evaluation of L arm and knee pain. Pt reports that he has been having pain all over, but worse over the L arm and L knee over last day. Denies any recent trauma, injury, falls. Denies injections into joints. Reports feeling feverish over the last 4-5 days. In the ED, pt noted to have swelling to L forearm and L knee. Initial VS: Tmax 102.7, , /84, sats 100% on room air. Initial work up: WBC 15, Na 129, , procal 0.84, UDS + amphetamines. Imaging notable for: large L knee effusion, MRI of LUE showed ulnar shaft chronic fracture with callus formation, fracture incompletely healed, also has 2 loculated fluid collections at margins of callus concerning for osteomyelitis, abscesses and cellulitis. Pt underwent aspiration of L knee in the ED, aspirate yellow/cloudy with 68190 WBC with 89% segs, concerning for septic arthritis. Pt received Vanc + Cefepime in the ED. Orthopedics consulted and planning for washout of L knee and forearm today. Hospital medicine consulted for admission     Overview/Hospital Course:  Underwent washout of L knee and L forearm with ortho Dr. Lamar on 09/25/2024. Blood cultures from admission with staph in 2/2 sets, likely SST source of infection. Remains on IV Vanc + Cefepime. Infectious disease consulted for antibiotics recommendations. As blood cultures showed MRSA, Cefepime discontinued per ID. Underwent repeat I&D of LUE on 09/27 with Dr. Lamar and planned for another I&D on 09/30 per Dr. Lamar    ID  "recommend 4 week course of IV Vancomycin with EOT 10/29/2024  Will need LTAC placement for long term IV abx once ortho plans finalized. Not a candidate for home IV infusion as he is homeless and IVDU. Will consult SW to start LTAC process.     09/30/2024  Ortho taking him to OR today. Planned to I/d arm but now doing both knee and arm. Will follow up postoperatively.    Interval History:  See hospital course    Objective:   /70 (Patient Position: Lying)   Pulse 100   Temp 97.1 °F (36.2 °C) (Oral)   Resp 18   Ht 6' 1" (1.854 m)   Wt 67.9 kg (149 lb 11.1 oz)   SpO2 99%   BMI 19.75 kg/m²     Intake/Output Summary (Last 24 hours) at 9/30/2024 0731  Last data filed at 9/30/2024 0403  Gross per 24 hour   Intake 495.91 ml   Output 800 ml   Net -304.09 ml       PHYSICAL EXAM  Vitals reviewed  Constitutional:       General: He is not in acute distress.     Appearance: Ill appearance: chronic.   Cardiovascular:      Rate and Rhythm: Normal rate and regular rhythm.   Pulmonary:      Effort: Pulmonary effort is normal.      Breath sounds: Normal breath sounds. No wheezing or rales.   Abdominal:      General: Bowel sounds are normal. There is no distension.      Palpations: Abdomen is soft.      Tenderness: There is no abdominal tenderness.   Musculoskeletal:      Right lower leg: No edema.   Skin:     Comments: LUE AND LLE DRESSINGS IN PLACE    Neurological:      Mental Status: He is alert and oriented to person, place, and time.     LABS  All labs from the past 24 hours were reviewed.     BMP:   Recent Labs   Lab 09/30/24  0632   GLU 91   *   K 4.4      CO2 25   BUN 8   CREATININE 0.7   CALCIUM 9.1     CBC:   Recent Labs   Lab 09/29/24  0503 09/30/24  0632   WBC 9.54 11.86   HGB 9.4* 10.6*   HCT 28.9* 33.3*   * 706*     CMP:   Recent Labs   Lab 09/29/24  0503 09/30/24  0632    133*   K 3.7 4.4    101   CO2 25 25   GLU 99 91   BUN 9 8   CREATININE 0.7 0.7   CALCIUM 8.4* 9.1   ANIONGAP " "8 7*     Cardiac Markers: No results for input(s): "CKMB", "MYOGLOBIN", "BNP", "TROPISTAT" in the last 48 hours.  Coagulation: No results for input(s): "PT", "INR", "APTT" in the last 48 hours.  Lactic Acid: No results for input(s): "LACTATE" in the last 48 hours.  Magnesium: No results for input(s): "MG" in the last 48 hours.  Troponin: No results for input(s): "TROPONINI", "TROPONINIHS" in the last 48 hours.  TSH:   No results for input(s): "TSH" in the last 4320 hours.  Urine Studies:   No results for input(s): "COLORU", "APPEARANCEUA", "PHUR", "SPECGRAV", "PROTEINUA", "GLUCUA", "KETONESU", "BILIRUBINUA", "OCCULTUA", "NITRITE", "UROBILINOGEN", "LEUKOCYTESUR", "RBCUA", "WBCUA", "BACTERIA", "SQUAMEPITHEL", "HYALINECASTS" in the last 48 hours.    Invalid input(s): "WRIGHTSUR"    IMAGING  All imaging from the past 24 hours were reviewed.     Imaging Results              MRI Lumbar Spine W WO Cont (Final result)  Result time 09/25/24 13:24:24      Final result by Darrian Zhang MD (09/25/24 13:24:24)                   Impression:      No acute abnormality or abnormal enhancement.    Multilevel degenerative changes as detailed above including faint Modic type 1 edema at the L5-S1 endplates.    No significant spinal canal stenosis.  Varying degrees of mild-to-moderate neural foraminal stenosis at the L4-L5 and L5-S1 levels.      Electronically signed by: Darrian Zhang  Date:    09/25/2024  Time:    13:24               Narrative:    EXAMINATION:  MRI LUMBAR SPINE W WO CONTRAST    CLINICAL HISTORY:  Low back pain, infection suspected;infec;    TECHNIQUE:  Multiplanar, multisequence MR images were acquired from the thoracolumbar junction to the sacrum before and after intravenous administration of 6 mL Gadavist    COMPARISON:  None.    FINDINGS:  There are 5 non-rib-bearing lumbar vertebrae.  Alignment is unremarkable with no significant listhesis.  No acute fracture or compression deformity.  No abnormal " osseous enhancement or aggressive focal signal abnormality.  Faint Modic type 1 edema noted at the L5-S1 endplates.    Conus medullaris terminates at the L2 level.  Conus medullaris is normal in size and signal.    T12-L1: No significant disc pathology.  No significant spinal canal or neural foraminal stenosis.    L1-L2: No significant disc pathology.  No significant spinal canal or neural foraminal stenosis.    L2-L3: No significant disc pathology.  No significant spinal canal or neural foraminal stenosis.    L3-L4: Mild disc desiccation and trace disc bulge.  Mild bilateral facet arthropathy.  No significant spinal canal or neural foraminal stenosis.    L4-L5: Mild disc desiccation and small circumferential disc bulge.  Mild bilateral facet arthropathy.  No significant spinal canal stenosis.  Mild left greater than right neural foraminal stenosis.    L5-S1: Disc desiccation and small circumferential disc bulge.  No significant spinal canal stenosis.  Moderate right and mild left neural foraminal stenosis.    Paraspinal soft tissues are unremarkable.  Visualized intra-abdominal and pelvic contents are also unremarkable.                                       MRI Forearm W WO Contrast Left (Final result)  Result time 09/25/24 13:43:57   Procedure changed from MRI Forearm With Contrast Left     Final result by Masood Ching MD (09/25/24 13:43:57)                   Impression:     Markedly abnormal MR left forearm.  See discussion above.    Finalized on: 9/25/2024 1:43 PM By:  Masood Ching MD  [facility]# [id number]      2024-09-25 13:46:00.030    [facility]               Narrative:    EXAM:  MRI FOREARM W WO CONTRAST LEFT    CLINICAL HISTORY:   Osteomyelitis, unspecified    TECHNIQUE:  MR left forearm with and without contrast performed multiplanar multisequence imaging.  6 mL Gadavist.    COMPARISON STUDY:   Left forearm x-ray 09/24/2025, 06/30/2024.    FINDINGS:  There is marked motion artifact on multiple sequences limiting  detail.    Again, there is a ulnar shaft chronic fracture sequela with pronounced mature callus formation, new compared with June 2024, incompletely healed with persistent fracture defect, filled in with enhancing granulation/fluid signal and marked circumferential soft tissue swelling encircling the fracture site and extending throughout the mid to distal forearm.    Additionally, there are (2) loculated nonenhancing complex fluid collections medial and lateral margins of the exuberant callus, 3.5 x 1.5 x 1 cm and 2.1 x 0.8 x 0.7 cm, with the larger collection demonstrating a sinus track extending directly to the exuberant callus formation.    There is enhancing ulnar shaft bone marrow edema with decreased T1 and increased STIR signal.    There is diffuse forearm and hand the prominent circumferential subcutaneous edema.    The findings are characteristic of osteomyelitis, soft tissue abscesses, and marked cellulitis all superimposed on ulnar shaft fracture with delayed healing.  There is concomitant infective myositis with muscle belly edema surrounding the marked periosteal inflammation.    Normal radius.                                           US Lower Extremity Veins Left (Final result)  Result time 09/25/24 09:38:54      Final result by Samanta Elder MD (09/25/24 09:38:54)                   Impression:      No evidence of deep venous thrombosis in the left lower extremity.    4.0 cm fluid collection along the medial aspect of the knee, which may represent knee effusion.      Electronically signed by: Samanta Elder  Date:    09/25/2024  Time:    09:38               Narrative:    EXAMINATION:  US LOWER EXTREMITY VEINS LEFT    CLINICAL HISTORY:  Other specified soft tissue disorders    TECHNIQUE:  Duplex and color flow Doppler evaluation and graded compression of the left lower extremity veins was performed.    COMPARISON:  None    FINDINGS:  Left thigh veins: The common femoral, femoral,  popliteal, upper greater saphenous, and deep femoral veins are patent and free of thrombus. The veins are normally compressible and have normal phasic flow and augmentation response.    Left calf veins: The visualized calf veins are patent.    Contralateral CFV: The contralateral (right) common femoral vein is patent and free of thrombus.    Miscellaneous: Fluid collection measuring 3.7 x 3.0 x 4.0 cm noted along the medial aspect of the knee, potentially representing effusion.                                       X-Ray Chest AP Portable (Final result)  Result time 09/25/24 08:51:35      Final result by Antwan Bolton MD (09/25/24 08:51:35)                   Impression:      1.  Reticular interstitial changes throughout the lungs.  Interstitial pulmonary edema versus interstitial infectious process must be considered.    2.  Negative for acute process otherwise.  Stable findings as noted above.      Electronically signed by: Antwan Bolton MD  Date:    09/25/2024  Time:    08:51               Narrative:    EXAMINATION:  XR CHEST AP PORTABLE    CLINICAL HISTORY:  Sepsis;    COMPARISON:  October 5, 2009    FINDINGS:  EKG leads overlie the chest.  Low lung volumes.  Reticular interstitial changes throughout the lungs.  The lungs are free of alveolar opacities.  The cardiac silhouette size is normal. The trachea is midline and the mediastinal width is normal. Negative for focal infiltrate, effusion or pneumothorax. Pulmonary vasculature is normal. Negative for osseous abnormalities. Tortuous aorta.  Marginal spondylosis.                                       X-Ray Forearm Left (Final result)  Result time 09/25/24 08:50:43      Final result by Antwan Bolton MD (09/25/24 08:50:43)                   Impression:      1.  Exuberant callus formation involves the distal ulnar shaft, with lucency in the ulnar shaft.  Considering the continued presence of a needle fragment in the antecubital fossa, an underlying infectious  process in the ulnar shaft must be considered.  Clinical correlation is advised.  The patient may benefit from an MRI to further evaluate.    2.  Stable findings as noted above.      Electronically signed by: Antwan Bolton MD  Date:    09/25/2024  Time:    08:50               Narrative:    EXAMINATION:  XR FOREARM LEFT    CLINICAL HISTORY:  Cellulitis of left upper limb    TECHNIQUE:  AP and lateral views of the left forearm were performed.    COMPARISON:  June 30, 2024    FINDINGS:  Exuberant callus formation surrounds is a present involves the distal ulnar shaft, with lucency in the ulnar shaft.  An infectious process must be considered.    The rest of the visualized osseous structures and soft tissues are intact.    There is a needle fragment within the antecubital fossa.                                       X-Ray Knee Complete 4 or More Views Left (Final result)  Result time 09/25/24 08:46:01      Final result by Antwan Bolton MD (09/25/24 08:46:01)                   Impression:      1.  Knee joint effusion without underlying fracture seen.  Joint effusions can be associated with trauma, infection or synovitis.      Electronically signed by: Antwan Bolton MD  Date:    09/25/2024  Time:    08:46               Narrative:    EXAMINATION:  XR KNEE COMP 4 OR MORE VIEWS LEFT    CLINICAL HISTORY:  Effusion, left knee    TECHNIQUE:  AP, lateral, and bilateral oblique views of the left knee were performed.    COMPARISON:  None    FINDINGS:  There is a large knee joint effusion.  No definite underlying fracture is seen.  Joint spaces are well maintained.  Fabella.                                        Assessment/Plan:      * Septic arthritis of knee, left  - s/p aspiration in the ED with 49872 WBC, 89% polys  - orthopedics consulted- s/p washout of L knee 09/25 with Dr. Lamar   - Continue empiric IV Vanc ; vanc dosing and monitoring per pharmacy  - aspirate  cultures  NGTD   - ID consulted and recommend IV Vanc x 4  "weeks (EOT 10/29)   - pain control with PO Roxicodone and IV mOrphine as needed  - continue PT/OT         Abscess of left arm  - MRI concerning for abscess, osteomyelitis and cellulitis of L arm   - orthopedics consulted; s/p washout 09/25/2024, 09/27/2024 with Dr. Lamar   - discussed with Dr. Lamar- pt will undergo repeat washout on 09/30/2024  - Npo at [facility]; hold subq heparin ppx   - prelim op culture with MRSA   - continue IV Antibiotics as above    - ID consult for abx recommendations - ID rec 4 week course of IV Vanc (EOT 10/29/2024)     Sepsis  This patient does have evidence of infective focus  My overall impression is sepsis.  Source: Skin and Soft Tissue (location L arm and L knee )  Antibiotics given-   Antibiotics (72h ago, onward)      Start     Stop Route Frequency Ordered    09/28/24 1145  mupirocin 2 % ointment         10/03/24 0859 Nasl 2 times daily 09/28/24 1034    09/27/24 1700  vancomycin 1,500 mg in D5W 250 mL IVPB (admixture device)         -- IV Every 12 hours (non-standard times) 09/27/24 1631    09/25/24 1013  vancomycin - pharmacy to dose  (vancomycin IVPB (PEDS and ADULTS))        Placed in "And" Linked Group    -- IV pharmacy to manage frequency 09/25/24 1014          Latest lactate reviewed-  Recent Labs   Lab 09/25/24  1310   LACTATE 0.8       Organ dysfunction indicated by  None     Fluid challenge s/p sepsis fluids in the Ed      Post- resuscitation assessment No - Post resuscitation assessment not needed       Will Not start Pressors   Source control achieved by: IV antibiotics, surgical washout     Staphylococcus aureus bacteremia  - likely skin/soft tissue source  - repeat blood cultures 09/27 NGTD   - TTE 09/26 with no vegetations noted   - ID consult for abx recommendations   - Continue IV Vancomycin       Hepatitis C antibody test positive  - confirmatory test pending, likely in setting of IVDA       IV drug abuse  Prior hx of IVDA, UDS on admission + for methamphetamines "   Complicating management of above conditions   Will need LTAC placement for IV abx on discharge; sw following         VTE Risk Mitigation (From admission, onward)           Ordered     Reason for No Pharmacological VTE Prophylaxis  Once        Question:  Reasons:  Answer:  Physician Provided (leave comment)  Comment:  surgical intervention planned    09/25/24 1434     IP VTE HIGH RISK PATIENT  Once         09/25/24 1434     Place sequential compression device  Until discontinued         09/25/24 1434                    Discharge Planning   HAYLEE:      Code Status: Full Code   Is the patient medically ready for discharge?:     Reason for patient still in hospital (select all that apply): Patient trending condition, Treatment, Consult recommendations, and Pending disposition  Discharge Plan A: Jesus Gómez MD  Department of Hospital Medicine   [facility] - Telemetry ([facility])

## 2024-09-30 NOTE — PT/OT/SLP PROGRESS
Physical Therapy      Patient Name:  Georges Daniels   MRN:  [id number]    Chart review complete. Presented to pt's room at 1500. Patient not seen today secondary to Off the floor for procedure/surgery. Will continue efforts.    Ann Piage, PT, DPT  9/30/2024   1500

## 2024-09-30 NOTE — BRIEF OP NOTE
[facility] - Telemetry ([facility])  Brief Operative Note    SUMMARY     Surgery Date: 9/30/2024     Surgeons and Role:     * Morena Lamar, DO - Primary    Assisting Surgeon: None    Pre-op Diagnosis:  Abscess of left arm [L02.414]    Post-op Diagnosis:  Post-Op Diagnosis Codes:     * Abscess of left arm [L02.414]     * Pyogenic bacterial arthritis of knee, left [M00.862]    Procedure(s) (LRB):  IRRIGATION AND DEBRIDEMENT, UPPER EXTREMITY (Left)  ARTHROSCOPY, KNEE, WITH LAVAGE AND DRAINAGE, FOR INFECTION (Left)    Anesthesia: General    Implants:  * No implants in log *    Operative Findings: Knee with less inflammation but fluid purulent.   Forearm looked clean.    Estimated Blood Loss: 10 mL    Estimated Blood Loss has been documented.         Specimens:   Specimen (24h ago, onward)      None            [id number]

## 2024-09-30 NOTE — PLAN OF CARE
SW attempted room visit to f/u on LTAC placement. Pt met with [facility] and [facility] liaisons.   [facility] clinically accepting. [facility] in [place] accepting with provisions that pt would have to agree to prior to facility submitting authorization.   Pt out of room for procedure. SW to follow up in AM to discuss.

## 2024-09-30 NOTE — PROGRESS NOTES
3 Days Post-Op       SUBJECTIVE:   Patient reports:  Left forearmnot hurting nearly as much as knee.  Knee hurting nearly as much as when he came in.  States it is getting hard to straighten and to walk on it again.       PT/Nursing notes reviewed.       Physical Exam:   Vital Signs   Wt Readings from Last 1 Encounters:   09/29/24 67.9 kg (149 lb 11.1 oz)     Temp Readings from Last 1 Encounters:   09/30/24 98.6 °F (37 °C) (Oral)     BP Readings from Last 1 Encounters:   09/30/24 118/67     Pulse Readings from Last 1 Encounters:   09/30/24 80       Body mass index is 19.75 kg/m².    General Appearance:   NAD,  ill appearing, cooperative    Neurologic:  Alert and oriented x3    Pysch:  Age appropriate    STATION:   supine  in bed/in chair    Musculoskeletal:     Left arm:  Dressing in place with no drainage.  No erythema. Swelling much improved.   No visible ecchymosis. Distal neurovascular status intact.     LEFT Knee  Dressing removed.  Incisions benign.  Knee effusion moderate.  ROM 30-90.  Tender about the knee.  Calf soft/NTTP.                   LABS:   Hemoglobin   Date/Time Value Ref Range Status   09/30/2024 06:32 AM 10.6 (L) 14.0 - 18.0 g/dL Final     Hematocrit   Date/Time Value Ref Range Status   09/30/2024 06:32 AM 33.3 (L) 40.0 - 54.0 % Final       Sodium   Date/Time Value Ref Range Status   09/30/2024 06:32  (L) 136 - 145 mmol/L Final     Potassium   Date/Time Value Ref Range Status   09/30/2024 06:32 AM 4.4 3.5 - 5.1 mmol/L Final     Glucose   Date/Time Value Ref Range Status   09/30/2024 06:32 AM 91 70 - 110 mg/dL Final           IMAGING:     Assessment:        LEFT knee septic arthritis, s/p arthroscopic I/D  Left ulna infected non-union, s/p Irrigation/debridement including of fracture nonunion  Sepsis  Hepatitis C  Homelessness  H/o IVDA          DISCUSSION:   Patient's symptoms, imaging, diagnosis and prognosis reviewed and discussed.  Discussed healing progression. Case discussed with  attending hospitalist.    Patient given an opportunity to ask questions.  When his questions, were answered, the following plan was adopted.      Plan:       Plan for irrigation and debridement of forearm, repeat knee arthroscopic I/D  Patient in agreement.  Will sign consent later.             Morena Lamar DO, CAJEFF, [facility]  Orthopaedic Surgeon

## 2024-09-30 NOTE — ANESTHESIA PROCEDURE NOTES
Intubation    Date/Time: 9/30/2024 2:19 PM    Performed by: Sandip Carolina CRNA  Authorized by: Ebenezer Prasad MD    Intubation:     Induction:  Intravenous    Intubated:  Postinduction    Mask Ventilation:  Easy mask    Attempts:  1    Attempted By:  CRNA    Method of Intubation:  Video laryngoscopy    Blade:  Mendes 3    Laryngeal View Grade: Grade I - full view of cords      Difficult Airway Encountered?: No      Complications:  None    Airway Device:  Oral endotracheal tube    Airway Device Size:  7.5    Style/Cuff Inflation:  Cuffed (inflated to minimal occlusive pressure)    Tube secured:  22    Secured at:  The lips    Placement Verified By:  Capnometry    Complicating Factors:  None    Findings Post-Intubation:  BS equal bilateral
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
## 2024-09-30 NOTE — CONSULTS
SW met with patient and s/o, Noelle, at bedside to discuss consult for LTAC. SW discussed recommendations and LTAC placement process. SW provided list of in-network options: [facility], [facility] and [facility] in [place]. Pt has no preference at this time and is ok with referrals to all facilities d/t pt's history.   Referrals sent via [facility].  SW to follow for acceptance and anticipated DC for auth to be submitted.  SW to follow.

## 2024-09-30 NOTE — PROGRESS NOTES
"PREOP DIAGNOSIS:  LEFT knee septic arthritis; left ulna/forearm infection    PLANNED PROCEDURE: LEFT knee arthroscopic I/D, left forearm I/d    Patient seen and examined in hallway due to contact precautions.     Patient ready for surgery    Physical Exam  Vitals:    09/30/24 1149   BP: 132/62   Pulse: 91   Resp: 18   Temp: 98.1 °F (36.7 °C)       GEN NAD, ill appearing    PSYCH A&Ox3, pleasant           PLAN    Risks and benefits reviewed with patient including possible failure of procedure to relieve symptoms, need for further surgery, risks of infection, bleeding, damage to nerves/arteries/tendons/cartilage/ligaments, blood clots, pneumonia and risks of anesthesia.  Patient given an opportunity to ask questions.  When his  questions were answered, he decided to proceed with surgery.       Extremity marked with the word "yes" and my initials.        Patient on long term antibiotics.   "

## 2024-10-01 LAB
ANION GAP SERPL CALC-SCNC: 8 MMOL/L (ref 8–16)
BACTERIA SPEC AEROBE CULT: ABNORMAL
BACTERIA SPEC AEROBE CULT: NO GROWTH
BASOPHILS # BLD AUTO: 0.04 K/UL (ref 0–0.2)
BASOPHILS NFR BLD: 0.5 % (ref 0–1.9)
BUN SERPL-MCNC: 14 MG/DL (ref 6–20)
CALCIUM SERPL-MCNC: 8.7 MG/DL (ref 8.7–10.5)
CHLORIDE SERPL-SCNC: 101 MMOL/L (ref 95–110)
CO2 SERPL-SCNC: 27 MMOL/L (ref 23–29)
CREAT SERPL-MCNC: 0.7 MG/DL (ref 0.5–1.4)
DIFFERENTIAL METHOD BLD: ABNORMAL
EOSINOPHIL # BLD AUTO: 0.1 K/UL (ref 0–0.5)
EOSINOPHIL NFR BLD: 1 % (ref 0–8)
ERYTHROCYTE [DISTWIDTH] IN BLOOD BY AUTOMATED COUNT: 14.5 % (ref 11.5–14.5)
EST. GFR  (NO RACE VARIABLE): >60 ML/MIN/1.73 M^2
GLUCOSE SERPL-MCNC: 108 MG/DL (ref 70–110)
HCT VFR BLD AUTO: 28.8 % (ref 40–54)
HGB BLD-MCNC: 9.2 G/DL (ref 14–18)
IMM GRANULOCYTES # BLD AUTO: 0.13 K/UL (ref 0–0.04)
IMM GRANULOCYTES NFR BLD AUTO: 1.6 % (ref 0–0.5)
LYMPHOCYTES # BLD AUTO: 2 K/UL (ref 1–4.8)
LYMPHOCYTES NFR BLD: 24.8 % (ref 18–48)
MCH RBC QN AUTO: 27.7 PG (ref 27–31)
MCHC RBC AUTO-ENTMCNC: 31.9 G/DL (ref 32–36)
MCV RBC AUTO: 87 FL (ref 82–98)
MONOCYTES # BLD AUTO: 0.9 K/UL (ref 0.3–1)
MONOCYTES NFR BLD: 10.5 % (ref 4–15)
NEUTROPHILS # BLD AUTO: 5 K/UL (ref 1.8–7.7)
NEUTROPHILS NFR BLD: 61.6 % (ref 38–73)
NRBC BLD-RTO: 0 /100 WBC
PLATELET # BLD AUTO: 606 K/UL (ref 150–450)
PMV BLD AUTO: 9.4 FL (ref 9.2–12.9)
POTASSIUM SERPL-SCNC: 4.4 MMOL/L (ref 3.5–5.1)
RBC # BLD AUTO: 3.32 M/UL (ref 4.6–6.2)
SODIUM SERPL-SCNC: 136 MMOL/L (ref 136–145)
VANCOMYCIN TROUGH SERPL-MCNC: 13.8 UG/ML (ref 10–22)
WBC # BLD AUTO: 8.07 K/UL (ref 3.9–12.7)

## 2024-10-01 PROCEDURE — 85025 COMPLETE CBC W/AUTO DIFF WBC: CPT | Performed by: INTERNAL MEDICINE

## 2024-10-01 PROCEDURE — 21400001 HC TELEMETRY ROOM

## 2024-10-01 PROCEDURE — 80202 ASSAY OF VANCOMYCIN: CPT | Performed by: STUDENT IN AN ORGANIZED HEALTH CARE EDUCATION/TRAINING PROGRAM

## 2024-10-01 PROCEDURE — 25000003 PHARM REV CODE 250: Performed by: ORTHOPAEDIC SURGERY

## 2024-10-01 PROCEDURE — 36415 COLL VENOUS BLD VENIPUNCTURE: CPT | Performed by: INTERNAL MEDICINE

## 2024-10-01 PROCEDURE — 25000003 PHARM REV CODE 250: Performed by: NURSE PRACTITIONER

## 2024-10-01 PROCEDURE — 36415 COLL VENOUS BLD VENIPUNCTURE: CPT | Performed by: STUDENT IN AN ORGANIZED HEALTH CARE EDUCATION/TRAINING PROGRAM

## 2024-10-01 PROCEDURE — 63600175 PHARM REV CODE 636 W HCPCS: Performed by: INTERNAL MEDICINE

## 2024-10-01 PROCEDURE — 99233 SBSQ HOSP IP/OBS HIGH 50: CPT | Mod: NSCH,,, | Performed by: INTERNAL MEDICINE

## 2024-10-01 PROCEDURE — 25000003 PHARM REV CODE 250: Performed by: INTERNAL MEDICINE

## 2024-10-01 PROCEDURE — 99024 POSTOP FOLLOW-UP VISIT: CPT | Mod: ,,, | Performed by: ORTHOPAEDIC SURGERY

## 2024-10-01 PROCEDURE — 97530 THERAPEUTIC ACTIVITIES: CPT

## 2024-10-01 PROCEDURE — 27000207 HC ISOLATION

## 2024-10-01 PROCEDURE — 80048 BASIC METABOLIC PNL TOTAL CA: CPT | Performed by: INTERNAL MEDICINE

## 2024-10-01 RX ORDER — ACETAMINOPHEN 500 MG
5000 TABLET ORAL DAILY
Status: DISCONTINUED | OUTPATIENT
Start: 2024-10-02 | End: 2024-10-03 | Stop reason: HOSPADM

## 2024-10-01 RX ORDER — OXYCODONE HYDROCHLORIDE 5 MG/1
5 TABLET ORAL EVERY 4 HOURS PRN
Status: DISCONTINUED | OUTPATIENT
Start: 2024-10-01 | End: 2024-10-03 | Stop reason: HOSPADM

## 2024-10-01 RX ADMIN — MORPHINE SULFATE 2 MG: 2 INJECTION, SOLUTION INTRAMUSCULAR; INTRAVENOUS at 09:10

## 2024-10-01 RX ADMIN — MUPIROCIN: 20 OINTMENT TOPICAL at 09:10

## 2024-10-01 RX ADMIN — SENNOSIDES AND DOCUSATE SODIUM 1 TABLET: 50; 8.6 TABLET ORAL at 09:10

## 2024-10-01 RX ADMIN — MORPHINE SULFATE 2 MG: 2 INJECTION, SOLUTION INTRAMUSCULAR; INTRAVENOUS at 06:10

## 2024-10-01 RX ADMIN — OXYCODONE HYDROCHLORIDE 5 MG: 5 TABLET ORAL at 04:10

## 2024-10-01 RX ADMIN — OXYCODONE HYDROCHLORIDE 5 MG: 5 TABLET ORAL at 06:10

## 2024-10-01 RX ADMIN — ACETAMINOPHEN 650 MG: 325 TABLET ORAL at 12:10

## 2024-10-01 RX ADMIN — VANCOMYCIN HYDROCHLORIDE 1250 MG: 1.25 INJECTION, POWDER, LYOPHILIZED, FOR SOLUTION INTRAVENOUS at 12:10

## 2024-10-01 RX ADMIN — VANCOMYCIN HYDROCHLORIDE 1250 MG: 1.25 INJECTION, POWDER, LYOPHILIZED, FOR SOLUTION INTRAVENOUS at 09:10

## 2024-10-01 RX ADMIN — VANCOMYCIN HYDROCHLORIDE 1250 MG: 1.25 INJECTION, POWDER, LYOPHILIZED, FOR SOLUTION INTRAVENOUS at 04:10

## 2024-10-01 RX ADMIN — OXYCODONE HYDROCHLORIDE 5 MG: 5 TABLET ORAL at 12:10

## 2024-10-01 NOTE — ASSESSMENT & PLAN NOTE
On Vanco/Cefepime- follow Ortho  Follow final cultures    09/28- cultures - MSSA  Blood cultures- MRSa-  Will plan to treat for up to 6 weeks with IV antibiotics  Will need placement

## 2024-10-01 NOTE — PLAN OF CARE
SW met with patient regarding preference for LTAC. Pt preference obtained for [facility]. SW notified liaison, Mary, of pt's preference; updated clinicals provided via [facility].   SW to follow for ok for facility to submit for auth; likely within next 24-48hrs.

## 2024-10-01 NOTE — PROGRESS NOTES
Pharmacokinetic Assessment Follow Up: IV Vancomycin    Vancomycin serum concentration assessment(s):    The trough level was drawn correctly and can be used to guide therapy at this time. The measurement is within the desired definitive target range of 15 to 20 mcg/mL.    Vancomycin Regimen Plan:    Continue regimen to Vancomycin 1250 mg IV every 8 hours with next serum trough concentration measured at 1145 prior to next  dose on 10/2    Drug levels (last 3 results):  Recent Labs   Lab Result Units 09/29/24  0503 09/30/24  0633 10/01/24  1156   Vancomycin-Trough ug/mL 9.7* 15.0 13.8       Pharmacy will continue to follow and monitor vancomycin.    Please contact pharmacy at extension [phone]   for questions regarding this assessment.    Thank you for the consult,   Magi Mora       Patient brief summary:  Georges Daniels is a 38 y.o. male initiated on antimicrobial therapy with IV Vancomycin for treatment of bacteremia/ Septic Arthritis of left knee and abscess of left arm      Drug Allergies:   Review of patient's allergies indicates:  No Known Allergies    Actual Body Weight:   67.9 kg    Renal Function:   Estimated Creatinine Clearance: 134.7 mL/min (based on SCr of 0.7 mg/dL).,     Dialysis Method (if applicable):  N/A  /Magi Mora [facility] 10/1/2024 1:56 PM      CBC (last 72 hours):  Recent Labs   Lab Result Units 09/29/24  0503 09/30/24  0632 10/01/24  0556   WBC K/uL 9.54 11.86 8.07   Hemoglobin g/dL 9.4* 10.6* 9.2*   Hematocrit % 28.9* 33.3* 28.8*   Platelets K/uL 614* 706* 606*   Gran % % 64.9 54.0 61.6   Lymph % % 23.4 32.5 24.8   Mono % % 9.0 9.4 10.5   Eosinophil % % 1.3 1.4 1.0   Basophil % % 0.4 0.8 0.5   Differential Method  Automated Automated Automated       Metabolic Panel (last 72 hours):  Recent Labs   Lab Result Units 09/29/24  0503 09/30/24  0632 10/01/24  0556   Sodium mmol/L 138 133* 136   Potassium mmol/L 3.7 4.4 4.4   Chloride mmol/L 105 101 101   CO2 mmol/L 25 25 27   Glucose  mg/dL 99 91 108   BUN mg/dL 9 8 14   Creatinine mg/dL 0.7 0.7 0.7       Vancomycin Administrations:  vancomycin given in the last 96 hours                     vancomycin 1,250 mg in D5W 250 mL IVPB (admixture device) (mg) 1,250 mg New Bag 10/01/24 1248     1,250 mg New Bag  0421     1,250 mg New Bag 09/30/24 2044     1,250 mg New Bag  0734     1,250 mg New Bag 09/29/24 2252     1,250 mg New Bag  1432     1,250 mg New Bag  0814    vancomycin 1,500 mg in D5W 250 mL IVPB (admixture device) ()  Restarted 09/28/24 1637     1,500 mg New Bag  1632      Restarted  0455     1,500 mg New Bag  0407     1,500 mg New Bag 09/27/24 1711                    Microbiologic Results:  Microbiology Results (last 7 days)       Procedure Component Value Units Date/Time    AFB Culture & Smear [[id number]] Collected: 09/30/24 1529    Order Status: Completed Specimen: Joint Fluid from Knee, Left Updated: 10/01/24 1341     AFB CULTURE STAIN No acid fast bacilli seen.    Narrative:      Synovial fluid    Blood culture [[id number]] Collected: 09/27/24 0506    Order Status: Completed Specimen: Blood Updated: 10/01/24 1222     Blood Culture, Routine No Growth to date      No Growth to date      No Growth to date      No Growth to date      No Growth to date    Blood culture [[id number]] Collected: 09/27/24 0500    Order Status: Completed Specimen: Blood Updated: 10/01/24 1222     Blood Culture, Routine No Growth to date      No Growth to date      No Growth to date      No Growth to date      No Growth to date    Culture, Anaerobe [[id number]] Collected: 09/27/24 0759    Order Status: Completed Specimen: Wound from Arm, Left Updated: 10/01/24 1025     Anaerobic Culture Culture in progress    Narrative:      Left Forearm    Aerobic culture [[id number]]  (Abnormal)  (Susceptibility) Collected: 09/27/24 0759    Order Status: Completed Specimen: Wound from Arm, Left Updated: 10/01/24 1005     Aerobic Bacterial Culture STAPHYLOCOCCUS AUREUS  Rare       Narrative:      Left Ulna    Aerobic culture [[id number]] Collected: 09/27/24 0759    Order Status: Completed Specimen: Wound from Arm, Left Updated: 10/01/24 0937     Aerobic Bacterial Culture No growth    Narrative:      Left Forearm    Culture, Body Fluid (Aerobic) w/ GS [[id number]] Collected: 09/30/24 1529    Order Status: Completed Specimen: Joint Fluid from Knee, Left Updated: 10/01/24 0908     Gram Stain Result Many WBC's      No organisms seen    Narrative:      Synovial fluid    Culture, Anaerobe [[id number]] Collected: 09/27/24 0759    Order Status: Completed Specimen: Wound from Arm, Left Updated: 10/01/24 0845     Anaerobic Culture Culture in progress    Narrative:      Left Ulna    Culture, Anaerobic [[id number]] Collected: 09/30/24 1529    Order Status: Sent Specimen: Joint Fluid from Knee, Left Updated: 10/01/24 0032    Fungus culture [[id number]] Collected: 09/30/24 1529    Order Status: Sent Specimen: Joint Fluid from Knee, Left Updated: 10/01/24 0032    AFB Culture & Smear [[id number]] Collected: 09/27/24 0759    Order Status: Completed Specimen: Wound from Arm, Left Updated: 09/30/24 1602     AFB Culture & Smear Culture in progress     AFB CULTURE STAIN No acid fast bacilli seen.    Narrative:      Left Ulna    AFB Culture & Smear [[id number]] Collected: 09/27/24 0759    Order Status: Completed Specimen: Wound from Arm, Left Updated: 09/30/24 1602     AFB Culture & Smear Culture in progress     AFB CULTURE STAIN No acid fast bacilli seen.    Narrative:      Left Forearm    Gram stain [[id number]] Collected: 09/30/24 1529    Order Status: Canceled Specimen: Joint Fluid from Knee, Left     Aerobic culture [[id number]] Collected: 09/25/24 1812    Order Status: Completed Specimen: Wound from Arm, Left Updated: 09/30/24 0928     Aerobic Bacterial Culture No growth    Narrative:      Left ulna    Aerobic culture [[id number]] Collected: 09/25/24 1812    Order Status: Completed Specimen: Wound from Arm,  Left Updated: 09/30/24 0911     Aerobic Bacterial Culture No growth    Narrative:      Left forearm near callous    Aerobic culture [[id number]] Collected: 09/25/24 1803    Order Status: Completed Specimen: Wound from Arm, Left Updated: 09/30/24 0905     Aerobic Bacterial Culture No growth    Narrative:      Left forearm superficial    Culture, Anaerobe [[id number]] Collected: 09/25/24 1847    Order Status: Completed Specimen: Tissue from Arm, Left Updated: 09/30/24 0851     Anaerobic Culture No anaerobes isolated    Narrative:      Left forearm deep tissue    Culture, Anaerobe [[id number]] Collected: 09/25/24 1812    Order Status: Completed Specimen: Wound from Arm, Left Updated: 09/30/24 0850     Anaerobic Culture Culture in progress    Narrative:      Left forearm near callous    Culture, Anaerobe [[id number]] Collected: 09/25/24 1803    Order Status: Completed Specimen: Wound from Arm, Left Updated: 09/30/24 0849     Anaerobic Culture Culture in progress    Narrative:      Left forearm superficial    Culture, Anaerobe [[id number]] Collected: 09/25/24 1746    Order Status: Completed Specimen: Joint Fluid from Knee, Left Updated: 09/30/24 0848     Anaerobic Culture No anaerobes isolated    Culture, Anaerobe [[id number]] Collected: 09/25/24 1847    Order Status: Completed Specimen: Tissue from Arm, Left Updated: 09/30/24 0847     Anaerobic Culture No anaerobes isolated    Narrative:      Left forearm soft tissue    Culture, Anaerobe [[id number]] Collected: 09/25/24 1812    Order Status: Completed Specimen: Wound from Arm, Left Updated: 09/30/24 0846     Anaerobic Culture Culture in progress    Narrative:      Left ulna    Aerobic culture [[id number]]  (Abnormal)  (Susceptibility) Collected: 09/25/24 1746    Order Status: Completed Specimen: Wound from Knee, Left Updated: 09/30/24 0655     Aerobic Bacterial Culture STAPHYLOCOCCUS AUREUS  Rare      Blood culture x two cultures. Draw prior to antibiotics. [[id number]]   (Abnormal) Collected: 09/25/24 0901    Order Status: Completed Specimen: Blood from Peripheral, Forearm, Right Updated: 09/29/24 0920     Blood Culture, Routine Gram stain aer bottle: Gram positive cocci in clusters resembling Staph      Gram stain altaf bottle: Gram positive cocci in clusters resembling Staph      Results called to and read back by: Marimar Valle RN 09/26/2024  06:27      STAPHYLOCOCCUS AUREUS  ID consult required at [facility].  For susceptibility see order #[id number]      Narrative:      Aerobic and anaerobic    Blood culture x two cultures. Draw prior to antibiotics. [[id number]]  (Abnormal)  (Susceptibility) Collected: 09/25/24 0903    Order Status: Completed Specimen: Blood from Peripheral, Antecubital, Left Updated: 09/29/24 0919     Blood Culture, Routine Gram stain aer bottle: Gram positive cocci in clusters resembling Staph      Gram stain altaf bottle: Gram positive cocci in clusters resembling Staph      Results called to and read back by: Marimar Valle RN 09/26/2024  06:27      STAPHYLOCOCCUS AUREUS  ID consult required at [facility].      Narrative:      Aerobic and anaerobic    Culture, Body Fluid - Bactec [[id number]]  (Abnormal)  (Susceptibility) Collected: 09/25/24 1009    Order Status: Completed Specimen: Joint Fluid from Knee, Left Updated: 09/29/24 0919     Body Fluid Culture, Sterile Gram stain: Gram positive cocci in clusters resembling Staph 09/26/2024      20:56      STAPHYLOCOCCUS AUREUS    Aerobic culture [[id number]]  (Abnormal) Collected: 09/25/24 1847    Order Status: Completed Specimen: Wound from Arm, Left Updated: 09/28/24 1324     Aerobic Bacterial Culture STAPHYLOCOCCUS AUREUS  Few  For susceptibility see order #[id number]      Narrative:      Left forearm soft tissue    Aerobic culture [[id number]]  (Abnormal)  (Susceptibility) Collected: 09/25/24 1847    Order Status: Completed Specimen: Wound from  Arm, Left Updated: 09/28/24 1323     Aerobic Bacterial Culture STAPHYLOCOCCUS AUREUS  Few      Narrative:      Left forearm deep tissue    Gram stain [[id number]] Collected: 09/27/24 0759    Order Status: Completed Specimen: Wound from Arm, Left Updated: 09/28/24 0335     Gram Stain Result No WBC's      No organisms seen    Narrative:      Left Forearm    Gram stain [[id number]] Collected: 09/27/24 0759    Order Status: Completed Specimen: Wound from Arm, Left Updated: 09/27/24 2132     Gram Stain Result No WBC's      No organisms seen    Narrative:      Left Ulna    Fungus culture [[id number]] Collected: 09/27/24 0759    Order Status: Sent Specimen: Wound from Arm, Left Updated: 09/27/24 1700    Fungus culture [[id number]] Collected: 09/27/24 0759    Order Status: Sent Specimen: Wound from Arm, Left Updated: 09/27/24 1700    AFB Culture & Smear [[id number]] Collected: 09/25/24 1847    Order Status: Completed Specimen: Tissue from Arm, Left Updated: 09/27/24 0927     AFB Culture & Smear Culture in progress     AFB CULTURE STAIN No acid fast bacilli seen.    Narrative:      Left forearm deep tissue    AFB Culture & Smear [[id number]] Collected: 09/25/24 1746    Order Status: Completed Specimen: Joint Fluid from Knee, Left Updated: 09/27/24 0927     AFB Culture & Smear Culture in progress     AFB CULTURE STAIN No acid fast bacilli seen.    AFB Culture & Smear [[id number]] Collected: 09/25/24 1803    Order Status: Completed Specimen: Wound from Arm, Left Updated: 09/27/24 0927     AFB Culture & Smear Culture in progress     AFB CULTURE STAIN No acid fast bacilli seen.    Narrative:      Left forearm superficial    AFB Culture & Smear [[id number]] Collected: 09/25/24 1812    Order Status: Completed Specimen: Wound from Arm, Left Updated: 09/27/24 0927     AFB Culture & Smear Culture in progress     AFB CULTURE STAIN No acid fast bacilli seen.    Narrative:      Left forearm near callous    AFB Culture & Smear  [[id number]] Collected: 09/25/24 1847    Order Status: Completed Specimen: Tissue from Arm, Left Updated: 09/27/24 0927     AFB Culture & Smear Culture in progress     AFB CULTURE STAIN No acid fast bacilli seen.    Narrative:      Left forearm soft tissue    AFB Culture & Smear [[id number]] Collected: 09/25/24 1812    Order Status: Completed Specimen: Wound from Arm, Left Updated: 09/27/24 0927     AFB Culture & Smear Culture in progress     AFB CULTURE STAIN No acid fast bacilli seen.    Narrative:      Left ulna    AFB Culture & Smear [[id number]] Collected: 09/25/24 1009    Order Status: Completed Specimen: Joint Fluid from Knee, Left Updated: 09/27/24 0927     AFB Culture & Smear Culture in progress     AFB CULTURE STAIN No acid fast bacilli seen.    Gram stain [[id number]] Collected: 09/25/24 1746    Order Status: Completed Specimen: Joint Fluid from Knee, Left Updated: 09/26/24 0930     Gram Stain Result Many WBC's      No organisms seen    Gram stain [[id number]] Collected: 09/25/24 1812    Order Status: Completed Specimen: Wound from Arm, Left Updated: 09/26/24 0919     Gram Stain Result No WBC's      No organisms seen    Narrative:      Left ulna    Gram stain [[id number]] Collected: 09/25/24 1847    Order Status: Completed Specimen: Tissue from Arm, Left Updated: 09/26/24 0914     Gram Stain Result Rare WBC's      No organisms seen    Narrative:      Left forearm soft tissue    Gram stain [[id number]] Collected: 09/25/24 1812    Order Status: Completed Specimen: Wound from Arm, Left Updated: 09/26/24 0807     Gram Stain Result No WBC's      No organisms seen    Narrative:      Left forearm near callous    Gram stain [[id number]] Collected: 09/25/24 1847    Order Status: Completed Specimen: Tissue from Arm, Left Updated: 09/26/24 0802     Gram Stain Result Rare WBC's      Rare Gram positive cocci    Narrative:      Left forearm deep tissue    Gram stain [[id number]] Collected: 09/25/24 1803    Order  Status: Completed Specimen: Wound from Arm, Left Updated: 09/26/24 0800     Gram Stain Result Rare WBC's      No organisms seen    Narrative:      Left forearm superficial    MRSA/SA Rapid ID by PCR from Blood culture [[id number]]  (Abnormal) Collected: 09/25/24 0903    Order Status: Completed Updated: 09/26/24 0754     Staph aureus ID by PCR Positive     Methicillin Resistant ID by PCR Positive    Narrative:      Aerobic and anaerobic    Fungus culture [[id number]] Collected: 09/25/24 1847    Order Status: Sent Specimen: Tissue from Arm, Left Updated: 09/26/24 0155    Fungus culture [[id number]] Collected: 09/25/24 1847    Order Status: Sent Specimen: Tissue from Arm, Left Updated: 09/26/24 0155    Fungus culture [[id number]] Collected: 09/25/24 1746    Order Status: Sent Specimen: Joint Fluid from Knee, Left Updated: 09/26/24 0155    Fungus culture [[id number]] Collected: 09/25/24 1803    Order Status: Sent Specimen: Wound from Arm, Left Updated: 09/26/24 0155    Fungus culture [[id number]] Collected: 09/25/24 1812    Order Status: Sent Specimen: Wound from Arm, Left Updated: 09/26/24 0155    Fungus culture [[id number]] Collected: 09/25/24 1812    Order Status: Sent Specimen: Wound from Arm, Left Updated: 09/26/24 0155    Fungus culture [[id number]] Collected: 09/25/24 1009    Order Status: Sent Specimen: Joint Fluid from Knee, Left Updated: 09/25/24 2138    Culture, Anaerobe [[id number]] Collected: 09/25/24 1813    Order Status: Sent Specimen: Wound from Arm, Left Updated: 09/25/24 1813    AFB Culture & Smear [[id number]] Collected: 09/25/24 1813    Order Status: Sent Specimen: Wound from Arm, Left Updated: 09/25/24 1813    Gram stain [[id number]] Collected: 09/25/24 1813    Order Status: Sent Specimen: Wound from Arm, Left Updated: 09/25/24 1813    Fungus culture [[id number]] Collected: 09/25/24 1813    Order Status: Sent Specimen: Wound from Arm, Left Updated: 09/25/24 6894    Aerobic culture [[id number]]  Collected: 09/25/24 1813    Order Status: Sent Specimen: Wound from Arm, Left Updated: 09/25/24 1813    Gram stain [[id number]] Collected: 09/25/24 1009    Order Status: Completed Specimen: Joint Fluid from Knee, Left Updated: 09/25/24 1133     Gram Stain Result Many WBC's      No epithelial cells      No organisms seen    Influenza A & B by Molecular [[id number]] Collected: 09/25/24 0938    Order Status: Completed Specimen: Nasopharyngeal Swab Updated: 09/25/24 1012     Influenza A, Molecular Negative     Influenza B, Molecular Negative     Flu A & B Source Nasal swab

## 2024-10-01 NOTE — ASSESSMENT & PLAN NOTE
- s/p aspiration in the ED with [id number] WBC, 89% polys  - orthopedics consulted- s/p washout of L knee 09/25 with Dr. Lamar   - Continue empiric IV Vanc ; vanc dosing and monitoring per pharmacy  - aspirate  cultures  NGTD   - ID consulted and recommend IV Vanc x 4 weeks (EOT 10/29)   - pain control with PO Roxicodone and IV mOrphine as needed  - continue PT/OT

## 2024-10-01 NOTE — ASSESSMENT & PLAN NOTE
Prior hx of IVDA, UDS on admission + for methamphetamines   Complicating management of above conditions   Will need LTAC placement for IV abx on discharge; sw following   --patient clinically accepted by [facility], possible d/c on Thursday pending Ortho clearance

## 2024-10-01 NOTE — PROGRESS NOTES
[facility])  Infectious Disease  Progress Note    Patient Name: Georges Daniels  MRN: [id number]  Admission Date: 9/25/2024  Length of Stay: 6 days  Attending Physician: Yousuf Gómez MD  Primary Care Provider: Sally, Primary Doctor    Isolation Status: Contact  Assessment/Plan:      ID  * Septic arthritis of knee, left  S/p washout of L knee and L forearm with ortho Dr. Lamar on 09/25/2024.   Prelim cultures -GPC   On Vanco/Cefepime -  Will use cultures to adjust regime    09/28- will continue Vancomycin - this can be out patient regime if he goes to LTAC  09/30- will discuss with the primary team about disposition  Outpatient Antibiotic Therapy Plan:     Please send referral to    1) Infection:  MRSA bacteremia /septic knee     2) Discharge Antibiotics:     Intravenous antibiotics:IV vancomycin 1.250mg every 8 hours     3) Therapy Duration:  6 weeks      Estimated end date of IV antibiotics:      4) Outpatient Weekly Labs:11/12/24     Order the following labs to be drawn on Mondays:   CBC  CMP   CPK (when on Daptomycin)  ESR  CRP     Please send all labs to [facility] .    Abscess of left arm  On Vanco/Cefepime- follow Ortho  Follow final cultures    09/28- cultures - MSSA  Blood cultures- MRSa-  Will plan to treat for up to 6 weeks with IV antibiotics  Will need placement         Anticipated Disposition:     Thank you for your consult. I will follow-up with patient. Please contact us if you have any additional questions.    Panda Holman MD, [facility]  Infectious Disease  [facility])    Subjective:     Principal Problem:Septic arthritis of knee, left    HPI: 38 year old man with PMH notable for IV drug abuse, homelessness who was admitted for left  arm and knee pain.   There was associated history of fever . T max 102.7 ..  Labs and imaging test -   WBC 15, UDS + amphetamines. Imaging notable for: large L knee effusion, MRI of LUE showed ulnar shaft chronic fracture with  callus formation, fracture incompletely healed, also has 2 loculated fluid collections at margins of callus concerning for osteomyelitis, abscesses and cellulitis.   Body fluid culture- 09/25-  Body Fluid Culture, Sterile Gram stain: Gram positive cocci in clusters resembling Staph 09/26/2024     Blood culture- 09/25- GPC   ECho-09/25- no veg  Interval History:   38 year old man with MRSA bacteremia  Cultures from left arm - MSSA  09/30-   He reports left knee pain .  Awaiting knee wash out today    Review of Systems   Constitutional:  Negative for activity change, appetite change, chills and diaphoresis.   Neurological:  Negative for dizziness, facial asymmetry and headaches.     Objective:     Vital Signs (Most Recent):  Temp: 98 °F (36.7 °C) (10/01/24 0422)  Pulse: 97 (10/01/24 0439)  Resp: 18 (10/01/24 0422)  BP: 108/62 (10/01/24 0422)  SpO2: 98 % (10/01/24 0422) Vital Signs (24h Range):  Temp:  [97.6 °F (36.4 °C)-98.9 °F (37.2 °C)] 98 °F (36.7 °C)  Pulse:  [] 97  Resp:  [10-20] 18  SpO2:  [97 %-100 %] 98 %  BP: (105-137)/(55-88) 108/62     Weight: 67.9 kg (149 lb 11.1 oz)  Body mass index is 19.75 kg/m².    Estimated Creatinine Clearance: 134.7 mL/min (based on SCr of 0.7 mg/dL).     Physical Exam  Vitals and nursing note reviewed.   HENT:      Head: Normocephalic.   Cardiovascular:      Rate and Rhythm: Normal rate.   Pulmonary:      Effort: Pulmonary effort is normal.   Musculoskeletal:      Comments: Dressing noted   Skin:     Findings: Lesion present.   Neurological:      Mental Status: He is alert.          Significant Labs: Blood Culture:   Recent Labs   Lab 09/25/24  0901 09/25/24  0903 09/27/24  0500 09/27/24  0506   LABBLOO Gram stain aer bottle: Gram positive cocci in clusters resembling Staph  Gram stain altaf bottle: Gram positive cocci in clusters resembling Staph  Results called to and read back by: Marimar Valle RN 09/26/2024  06:27  STAPHYLOCOCCUS AUREUS  ID consult required at [facility] [place] [facility]  [facility] and [facility].  For susceptibility see order #[id number]  * Gram stain aer bottle: Gram positive cocci in clusters resembling Staph  Gram stain altaf bottle: Gram positive cocci in clusters resembling Staph  Results called to and read back by: Marimar Valle RN 09/26/2024  06:27  STAPHYLOCOCCUS AUREUS  ID consult required at [facility] and [facility].  * No Growth to date  No Growth to date  No Growth to date  No Growth to date No Growth to date  No Growth to date  No Growth to date  No Growth to date     CBC:   Recent Labs   Lab 09/30/24  0632   WBC 11.86   HGB 10.6*   HCT 33.3*   *     CMP:   Recent Labs   Lab 09/30/24  0632   *   K 4.4      CO2 25   GLU 91   BUN 8   CREATININE 0.7   CALCIUM 9.1   ANIONGAP 7*     Wound Culture:   Recent Labs   Lab 09/25/24  1746 09/25/24  1803 09/25/24  1812 09/25/24  1847 09/27/24  0759   LABAERO STAPHYLOCOCCUS AUREUS  Rare  * No growth No growth  No growth STAPHYLOCOCCUS AUREUS  Few  For susceptibility see order #[id number]  *  STAPHYLOCOCCUS AUREUS  Few  * No growth  STAPHYLOCOCCUS AUREUS  Rare  Susceptibility pending  *     All pertinent labs within the past 24 hours have been reviewed.    Significant Imaging: I have reviewed all pertinent imaging results/findings within the past 24 hours.

## 2024-10-01 NOTE — SUBJECTIVE & OBJECTIVE
Interval History:   38 year old man with MRSA bacteremia  Cultures from left arm - MSSA  09/30-   He reports left knee pain .  Awaiting knee wash out today    Review of Systems   Constitutional:  Negative for activity change, appetite change, chills and diaphoresis.   Neurological:  Negative for dizziness, facial asymmetry and headaches.     Objective:     Vital Signs (Most Recent):  Temp: 98 °F (36.7 °C) (10/01/24 0422)  Pulse: 97 (10/01/24 0439)  Resp: 18 (10/01/24 0422)  BP: 108/62 (10/01/24 0422)  SpO2: 98 % (10/01/24 0422) Vital Signs (24h Range):  Temp:  [97.6 °F (36.4 °C)-98.9 °F (37.2 °C)] 98 °F (36.7 °C)  Pulse:  [] 97  Resp:  [10-20] 18  SpO2:  [97 %-100 %] 98 %  BP: (105-137)/(55-88) 108/62     Weight: 67.9 kg (149 lb 11.1 oz)  Body mass index is 19.75 kg/m².    Estimated Creatinine Clearance: 134.7 mL/min (based on SCr of 0.7 mg/dL).     Physical Exam  Vitals and nursing note reviewed.   HENT:      Head: Normocephalic.   Cardiovascular:      Rate and Rhythm: Normal rate.   Pulmonary:      Effort: Pulmonary effort is normal.   Musculoskeletal:      Comments: Dressing noted   Skin:     Findings: Lesion present.   Neurological:      Mental Status: He is alert.          Significant Labs: Blood Culture:   Recent Labs   Lab 09/25/24  0901 09/25/24  0903 09/27/24  0500 09/27/24  0506   LABBLOO Gram stain aer bottle: Gram positive cocci in clusters resembling Staph  Gram stain altaf bottle: Gram positive cocci in clusters resembling Staph  Results called to and read back by: Marimar Valle RN 09/26/2024  06:27  STAPHYLOCOCCUS AUREUS  ID consult required at [facility] and [facility] locations.  For susceptibility see order #[id number]  * Gram stain aer bottle: Gram positive cocci in clusters resembling Staph  Gram stain altaf bottle: Gram positive cocci in clusters resembling Staph  Results called to and read back by: Marimar Valle RN 09/26/2024  06:27  STAPHYLOCOCCUS AUREUS  ID consult required  at [facility] and [place] locations.  * No Growth to date  No Growth to date  No Growth to date  No Growth to date No Growth to date  No Growth to date  No Growth to date  No Growth to date     CBC:   Recent Labs   Lab 09/30/24  0632   WBC 11.86   HGB 10.6*   HCT 33.3*   *     CMP:   Recent Labs   Lab 09/30/24  0632   *   K 4.4      CO2 25   GLU 91   BUN 8   CREATININE 0.7   CALCIUM 9.1   ANIONGAP 7*     Wound Culture:   Recent Labs   Lab 09/25/24  1746 09/25/24  1803 09/25/24  1812 09/25/24  1847 09/27/24  0759   LABAERO STAPHYLOCOCCUS AUREUS  Rare  * No growth No growth  No growth STAPHYLOCOCCUS AUREUS  Few  For susceptibility see order #[id number]  *  STAPHYLOCOCCUS AUREUS  Few  * No growth  STAPHYLOCOCCUS AUREUS  Rare  Susceptibility pending  *     All pertinent labs within the past 24 hours have been reviewed.    Significant Imaging: I have reviewed all pertinent imaging results/findings within the past 24 hours.

## 2024-10-01 NOTE — PLAN OF CARE
A244/A244 DAVFerny Daniels is a 38 y.o.male admitted on 9/25/2024 for Septic arthritis of knee, left   Code Status: Full Code MRN: [id number]   Review of patient's allergies indicates:  No Known Allergies  Past Medical History:   Diagnosis Date    Hepatitis C antibody positive in blood 09/25/2024    RNA NEGATIVE    Hyponatremia 09/25/2024    Medical history non-contributory       PRN meds    0.9% NaCl, , PRN  0.9% NaCl, , PRN  acetaminophen, 650 mg, Q4H PRN  dextrose 10%, 12.5 g, PRN  dextrose 10%, 25 g, PRN  glucagon (human recombinant), 1 mg, PRN  glucose, 16 g, PRN  glucose, 24 g, PRN  melatonin, 6 mg, Nightly PRN  morphine, 2 mg, Q4H PRN  naloxone, 0.02 mg, PRN  ondansetron, 4 mg, Q8H PRN  oxyCODONE, 5 mg, Q6H PRN  polyethylene glycol, 17 g, Daily PRN  prochlorperazine, 5 mg, Q6H PRN  sodium chloride 0.9%, 10 mL, Q12H PRN  vancomycin - pharmacy to dose, , pharmacy to manage frequency      Chart check completed. Will continue plan of care.      Orientation: oriented x 4  Jennifer Coma Scale Score: 15     Lead Monitored: Lead II Rhythm: normal sinus rhythm    Cardiac/Telemetry Box Number: [id number]  VTE Required Core Measure: Patient refused interventions Last Bowel Movement: 09/30/24  Diet Adult Regular     Geraldo Score: 20  Fall Risk Score: 10  Accucheck []   Freq?      Lines/Drains/Airways       Peripheral Intravenous Line  Duration                  Peripheral IV - Single Lumen 09/27/24 0211 20 G Anterior;Proximal;Right Forearm 4 days         Peripheral IV - Single Lumen 09/30/24 1624 20 G Right Wrist <1 day

## 2024-10-01 NOTE — ASSESSMENT & PLAN NOTE
S/p washout of L knee and L forearm with ortho Dr. Lamar on 09/25/2024.   Prelim cultures -GPC   On Vanco/Cefepime -  Will use cultures to adjust regime    09/28- will continue Vancomycin - this can be out patient regime if he goes to LTAC  09/30- will discuss with the primary team about disposition  Outpatient Antibiotic Therapy Plan:     Please send referral to    1) Infection:  MRSA bacteremia /septic knee     2) Discharge Antibiotics:     Intravenous antibiotics:IV vancomycin 1.250mg every 8 hours     3) Therapy Duration:  6 weeks      Estimated end date of IV antibiotics:      4) Outpatient Weekly Labs:11/12/24     Order the following labs to be drawn on Mondays:   CBC  CMP   CPK (when on Daptomycin)  ESR  CRP     Please send all labs to [facility] .

## 2024-10-01 NOTE — PROGRESS NOTES
[facility] Medicine  Progress Note    Patient Name: Georges Daniels  MRN: [id number]  Patient Class: IP- Inpatient   Admission Date: 9/25/2024  Length of Stay: 6 days  Attending Physician: Frieda Alberto MD  Primary Care Provider: Sally, Primary Doctor        Subjective:     Principal Problem:Septic arthritis of knee, left        HPI:  Pt is a 37 YO  male with PMH notable for IV drug abuse, homelessness who presents to the ED for evaluation of L arm and knee pain. Pt reports that he has been having pain all over, but worse over the L arm and L knee over last day. Denies any recent trauma, injury, falls. Denies injections into joints. Reports feeling feverish over the last 4-5 days. In the ED, pt noted to have swelling to L forearm and L knee. Initial VS: Tmax 102.7, , /84, sats 100% on room air. Initial work up: WBC 15, Na 129, , procal 0.84, UDS + amphetamines. Imaging notable for: large L knee effusion, MRI of LUE showed ulnar shaft chronic fracture with callus formation, fracture incompletely healed, also has 2 loculated fluid collections at margins of callus concerning for osteomyelitis, abscesses and cellulitis. Pt underwent aspiration of L knee in the ED, aspirate yellow/cloudy with 75109 WBC with 89% segs, concerning for septic arthritis. Pt received Vanc + Cefepime in the ED. Orthopedics consulted and planning for washout of L knee and forearm today. Hospital medicine consulted for admission     Overview/Hospital Course:  Underwent washout of L knee and L forearm with ortho Dr. Lamar on 09/25/2024. Blood cultures from admission with staph in 2/2 sets, likely SST source of infection. Remains on IV Vanc + Cefepime. Infectious disease consulted for antibiotics recommendations. As blood cultures showed MRSA, Cefepime discontinued per ID. Underwent repeat I&D of LUE on 09/27 with Dr. Lamar and planned for another I&D on 09/30 per   [place]    ID recommend 4 week course of IV Vancomycin with EOT 10/29/2024  Will need LTAC placement for long term IV abx once ortho plans finalized. Not a candidate for home IV infusion as he is homeless and IVDU. Will consult SW to start LTAC process.     Ortho took him back to the OR on 9/30, planned for I&D of arm, reported no need for further intervention with arm. Proceeded with left Knee arthroscopy with lavage and drainage, collected fluid for cultures. Patient has been clinically accepted by an LTAC, discussed with orthopedic, recommended to hold for approx. 48H to ensure he does not require additional surgical intervention prior to transfer.     Interval History: Afebrile, labs stable. Pending Culture results    Review of Systems   Constitutional:  Negative for activity change, appetite change, chills and diaphoresis.   Musculoskeletal:  Positive for arthralgias, gait problem and joint swelling.   Neurological:  Negative for dizziness, facial asymmetry and headaches.     Objective:     Vital Signs (Most Recent):  Temp: 98.5 °F (36.9 °C) (10/01/24 1145)  Pulse: 101 (10/01/24 1145)  Resp: 17 (10/01/24 1245)  BP: 136/76 (10/01/24 1145)  SpO2: 96 % (10/01/24 1145) Vital Signs (24h Range):  Temp:  [97.6 °F (36.4 °C)-99 °F (37.2 °C)] 98.5 °F (36.9 °C)  Pulse:  [] 101  Resp:  [10-20] 17  SpO2:  [96 %-100 %] 96 %  BP: (105-137)/(55-88) 136/76     Weight: 67.9 kg (149 lb 11.1 oz)  Body mass index is 19.75 kg/m².    Intake/Output Summary (Last 24 hours) at 10/1/2024 1448  Last data filed at 10/1/2024 0830  Gross per 24 hour   Intake 1540 ml   Output 1260 ml   Net 280 ml         Physical Exam  Vitals and nursing note reviewed.   Constitutional:       General: He is not in acute distress.     Appearance: Ill appearance: chronic.   Cardiovascular:      Rate and Rhythm: Normal rate and regular rhythm.   Pulmonary:      Effort: Pulmonary effort is normal.      Breath sounds: Normal breath sounds. No wheezing or  rales.   Abdominal:      General: Bowel sounds are normal. There is no distension.      Palpations: Abdomen is soft.      Tenderness: There is no abdominal tenderness.   Musculoskeletal:      Right lower leg: No edema.   Skin:     Comments: LUE AND LLE DRESSINGS IN PLACE    Neurological:      Mental Status: He is alert and oriented to person, place, and time.             Significant Labs: All pertinent labs within the past 24 hours have been reviewed.  CBC:   Recent Labs   Lab 09/30/24  0632 10/01/24  0556   WBC 11.86 8.07   HGB 10.6* 9.2*   HCT 33.3* 28.8*   * 606*     CMP:   Recent Labs   Lab 09/30/24  0632 10/01/24  0556   * 136   K 4.4 4.4    101   CO2 25 27   GLU 91 108   BUN 8 14   CREATININE 0.7 0.7   CALCIUM 9.1 8.7   ANIONGAP 7* 8       Significant Imaging: I have reviewed all pertinent imaging results/findings within the past 24 hours.    Assessment/Plan:      * Septic arthritis of knee, left  - s/p aspiration in the ED with 22848 WBC, 89% polys  - orthopedics consulted- s/p washout of L knee 09/25 with Dr. Lamar   - Continue empiric IV Vanc ; vanc dosing and monitoring per pharmacy  - aspirate  cultures  NGTD   - ID consulted and recommend IV Vanc x 4 weeks (EOT 10/29)   - pain control with PO Roxicodone and IV mOrphine as needed  - continue PT/OT         Staphylococcus aureus bacteremia  - likely skin/soft tissue source  - repeat blood cultures 09/27 NGTD   - TTE 09/26 with no vegetations noted   - ID consult for abx recommendations   - Continue IV Vancomycin       Hepatitis C antibody test positive  - confirmatory test pending, likely in setting of IVDA       Sepsis  This patient does have evidence of infective focus  My overall impression is sepsis.  Source: Skin and Soft Tissue (location L arm and L knee )  Antibiotics given-   Antibiotics (72h ago, onward)      Start     Stop Route Frequency Ordered    09/28/24 1145  mupirocin 2 % ointment         10/03/24 0859 Nasl 2 times daily  "09/28/24 1034    09/27/24 1700  vancomycin 1,500 mg in D5W 250 mL IVPB (admixture device)         -- IV Every 12 hours (non-standard times) 09/27/24 1631    09/25/24 1013  vancomycin - pharmacy to dose  (vancomycin IVPB (PEDS and ADULTS))        Placed in "And" Linked Group    -- IV pharmacy to manage frequency 09/25/24 1014          Latest lactate reviewed-  Recent Labs   Lab 09/25/24  1310   LACTATE 0.8       Organ dysfunction indicated by  None     Fluid challenge s/p sepsis fluids in the Ed      Post- resuscitation assessment No - Post resuscitation assessment not needed       Will Not start Pressors   Source control achieved by: IV antibiotics, surgical washout     IV drug abuse  Prior hx of IVDA, UDS on admission + for methamphetamines   Complicating management of above conditions   Will need LTAC placement for IV abx on discharge; sw following   --patient clinically accepted by [facility], possible d/c on Thursday pending Ortho clearance      Abscess of left arm  - MRI concerning for abscess, osteomyelitis and cellulitis of L arm   - orthopedics consulted; s/p washout 09/25/2024, 09/27/2024 with Dr. Lamar   - discussed with Dr. Lamar- pt will undergo repeat washout on 09/30/2024  - prelim op culture with MRSA   - continue IV Antibiotics as above    - ID consult for abx recommendations - ID rec 4 week course of IV Vanc (EOT 10/29/2024)       VTE Risk Mitigation (From admission, onward)           Ordered     Reason for No Pharmacological VTE Prophylaxis  Once        Question:  Reasons:  Answer:  Physician Provided (leave comment)  Comment:  surgical intervention planned    09/25/24 1434     IP VTE HIGH RISK PATIENT  Once         09/25/24 1434     Place sequential compression device  Until discontinued         09/25/24 1434                    Discharge Planning   HAYLEE:      Code Status: Full Code   Is the patient medically ready for discharge?:     Reason for patient still in hospital (select all that " apply): Patient trending condition  Discharge Plan A: Shelter                  April J TORIBIO Camacho  Department of Hospital Medicine   [facility]

## 2024-10-01 NOTE — ANESTHESIA POSTPROCEDURE EVALUATION
Anesthesia Post Evaluation    Patient: Georges Daniels    Procedure(s) Performed: Procedure(s) (LRB):  IRRIGATION AND DEBRIDEMENT, UPPER EXTREMITY (Left)    Final Anesthesia Type: general      Patient location during evaluation: PACU  Patient participation: Yes- Able to Participate  Level of consciousness: awake and alert and oriented  Post-procedure vital signs: reviewed and stable  Pain management: adequate  Airway patency: patent  CARLA mitigation strategies: Verification of full reversal of neuromuscular block  PONV status at discharge: No PONV  Anesthetic complications: no      Cardiovascular status: blood pressure returned to baseline and hemodynamically stable  Respiratory status: unassisted  Hydration status: euvolemic  Follow-up not needed.              Vitals Value Taken Time   /76 09/27/24 1145   Temp 36.9 °C (98.5 °F) 09/27/24 1145   Pulse 101 09/27/24 1145   Resp 17 09/27/24 1245   SpO2 96 % 09/27/24 1145         Event Time   Out of Recovery 09/27/2024 09:01:04         Pain/Adis Score: Pain Rating Prior to Med Admin: 10 (10/1/2024 12:45 PM)  Pain Rating Post Med Admin: 0 (10/1/2024  5:15 AM)  Adis Score: 10 (9/30/2024  5:00 PM)

## 2024-10-01 NOTE — PLAN OF CARE
Patient currently NSR on the monitor. Patient has no acute signs of distress and states he has no need at this time. Pain well-controlled at this time.

## 2024-10-01 NOTE — PT/OT/SLP PROGRESS
"Physical Therapy Treatment    Patient Name:  Georges Daniels   MRN:  [id number]    Recommendations:     Discharge Recommendations: High Intensity Therapy  Discharge Equipment Recommendations: to be determined by next level of care  Barriers to discharge:  pt is homeless     Assessment:     Georges Daniels is a 38 y.o. male admitted with a medical diagnosis of Septic arthritis of knee, left.  He presents with the following impairments/functional limitations: weakness, impaired endurance, impaired functional mobility, gait instability, impaired balance, pain, decreased safety awareness, decreased lower extremity function, decreased ROM, orthopedic precautions.    Rehab Prognosis: Fair; patient would benefit from acute skilled PT services to address these deficits and reach maximum level of function.    Recent Surgery: Procedure(s) (LRB):  IRRIGATION AND DEBRIDEMENT, UPPER EXTREMITY (Left)  ARTHROSCOPY, KNEE, WITH LAVAGE AND DRAINAGE, FOR INFECTION (Left) 1 Day Post-Op    Plan:     During this hospitalization, patient to be seen 3 x/week to address the identified rehab impairments via gait training, therapeutic activities, therapeutic exercises and progress toward the following goals:    Plan of Care Expires:  10/10/24    Subjective     Chief Complaint: Pt refused PT intervention at this time due to pain. Reported "I just got up to go to the bathroom." Pt reports he is unable to put weight through L LE due to pain.   Patient/Family Comments/goals: none stated  Pain/Comfort:  Pain Rating 1: 9/10  Location - Side 1: Right  Location - Orientation 1: generalized  Location 1: leg  Pain Addressed 1: Reposition, Distraction  Pain Rating Post-Intervention 1: 9/10      Objective:     Communicated with nurse and epic chart review prior to session.  Patient found  long sitting in bed  with peripheral IV, telemetry upon PT entry to room.     General Precautions: Standard, fall, contact  Orthopedic Precautions: LLE " "weight bearing as tolerated, LUE weight bearing as tolerated (UE weightbearing with platform RW)  Braces: N/A  Respiratory Status: Room air     Functional Mobility:  Pt refused all functional mobility at this time due to pain.     AM-PAC 6 CLICK MOBILITY  Turning over in bed (including adjusting bedclothes, sheets and blankets)?: 1 (REF)  Sitting down on and standing up from a chair with arms (e.g., wheelchair, bedside commode, etc.): 1 (REF)  Moving from lying on back to sitting on the side of the bed?: 1 (REF)  Moving to and from a bed to a chair (including a wheelchair)?: 1 (REF)  Need to walk in hospital room?: 1 (REF)  Climbing 3-5 steps with a railing?: 1 (NT)  Basic Mobility Total Score: 6       Treatment & Education:  Reviewed role of PT in acute care and POC. Pt refused all OOB/EOB activity and supine TherEx, despite max encouragement from therapist, due to pain. Educated on the importance of OOB/EOB activity for his recovery. Educated on importance of consistent participation with PT. Reviewed L LE and UE WBAT and use of platform RW for mobility. Educated on importance of TherEx to maintain/regain strength, encouraged to complete supine TherEx (hip flex, hip abd/add, heel slides, quad sets, ankle pumps) throughout the day. Encouraged frequent position changes to reduce the risk of pressure injury. Encouraged to sit up in the chair for all meals. Reviewed "call don't fall" policy and increased risk of falling due to weakness, instructed to utilize call bell for assistance with all transfers. Pt agreeable to all requests.    Patient left  long sitting in bed  with all lines intact, call button in reach, and visitor present.    GOALS:   Multidisciplinary Problems       Physical Therapy Goals          Problem: Physical Therapy    Goal Priority Disciplines Outcome Interventions   Physical Therapy Goal     PT, PT/OT     Description: Goals to be met by 10/10/24.  1. Pt will complete bed mobility CGA.  2. Pt will " complete sit to stand MIN A.  3. Pt will ambulate 20ft MIN A with platform RW.  4. Pt will increase AMPAC score by 2 points to progress functional mobility.                       Time Tracking:     PT Received On: 10/01/24  PT Start Time: 1412     PT Stop Time: 1420  PT Total Time (min): 8 min     Billable Minutes: Therapeutic Activity 8min    Treatment Type: Treatment  PT/PTA: PT     Number of PTA visits since last PT visit: 0     10/01/2024

## 2024-10-01 NOTE — PLAN OF CARE
"Pt refused all OOB/EOB activity and supine TherEx, despite max encouragement from therapist, due to pain. Reported "I just got up to the bathroom." Educated on the importance of OOB/EOB activity for his recovery. Educated on importance of consistent participation with PT. Educated on importance of TherEx to maintain/regain strength, encouraged to complete supine TherEx (hip flex, hip abd/add, heel slides, quad sets, ankle pumps) throughout the day. Encouraged frequent position changes to reduce the risk of pressure injury. Encouraged to sit up in the chair for all meals. Recommending high intensity therapy upon d/c.  "

## 2024-10-01 NOTE — SUBJECTIVE & OBJECTIVE
Interval History: Afebrile, labs stable. Pending Culture results    Review of Systems   Constitutional:  Negative for activity change, appetite change, chills and diaphoresis.   Musculoskeletal:  Positive for arthralgias, gait problem and joint swelling.   Neurological:  Negative for dizziness, facial asymmetry and headaches.     Objective:     Vital Signs (Most Recent):  Temp: 98.5 °F (36.9 °C) (10/01/24 1145)  Pulse: 101 (10/01/24 1145)  Resp: 17 (10/01/24 1245)  BP: 136/76 (10/01/24 1145)  SpO2: 96 % (10/01/24 1145) Vital Signs (24h Range):  Temp:  [97.6 °F (36.4 °C)-99 °F (37.2 °C)] 98.5 °F (36.9 °C)  Pulse:  [] 101  Resp:  [10-20] 17  SpO2:  [96 %-100 %] 96 %  BP: (105-137)/(55-88) 136/76     Weight: 67.9 kg (149 lb 11.1 oz)  Body mass index is 19.75 kg/m².    Intake/Output Summary (Last 24 hours) at 10/1/2024 1448  Last data filed at 10/1/2024 0830  Gross per 24 hour   Intake 1540 ml   Output 1260 ml   Net 280 ml         Physical Exam  Vitals and nursing note reviewed.   Constitutional:       General: He is not in acute distress.     Appearance: Ill appearance: chronic.   Cardiovascular:      Rate and Rhythm: Normal rate and regular rhythm.   Pulmonary:      Effort: Pulmonary effort is normal.      Breath sounds: Normal breath sounds. No wheezing or rales.   Abdominal:      General: Bowel sounds are normal. There is no distension.      Palpations: Abdomen is soft.      Tenderness: There is no abdominal tenderness.   Musculoskeletal:      Right lower leg: No edema.   Skin:     Comments: LUE AND LLE DRESSINGS IN PLACE    Neurological:      Mental Status: He is alert and oriented to person, place, and time.             Significant Labs: All pertinent labs within the past 24 hours have been reviewed.  CBC:   Recent Labs   Lab 09/30/24  0632 10/01/24  0556   WBC 11.86 8.07   HGB 10.6* 9.2*   HCT 33.3* 28.8*   * 606*     CMP:   Recent Labs   Lab 09/30/24  0632 10/01/24  0556   * 136   K 4.4 4.4     101   CO2 25 27   GLU 91 108   BUN 8 14   CREATININE 0.7 0.7   CALCIUM 9.1 8.7   ANIONGAP 7* 8       Significant Imaging: I have reviewed all pertinent imaging results/findings within the past 24 hours.

## 2024-10-02 LAB
ACID FAST MOD KINY STN SPEC: NORMAL
BACTERIA BLD CULT: NORMAL
BACTERIA BLD CULT: NORMAL
CREAT SERPL-MCNC: 0.7 MG/DL (ref 0.5–1.4)
EST. GFR  (NO RACE VARIABLE): >60 ML/MIN/1.73 M^2
FUNGUS SPEC CULT: NORMAL
MYCOBACTERIUM SPEC QL CULT: NORMAL
VANCOMYCIN TROUGH SERPL-MCNC: 17.5 UG/ML (ref 10–22)

## 2024-10-02 PROCEDURE — 99024 POSTOP FOLLOW-UP VISIT: CPT | Mod: ,,, | Performed by: ORTHOPAEDIC SURGERY

## 2024-10-02 PROCEDURE — 36415 COLL VENOUS BLD VENIPUNCTURE: CPT | Performed by: STUDENT IN AN ORGANIZED HEALTH CARE EDUCATION/TRAINING PROGRAM

## 2024-10-02 PROCEDURE — 36415 COLL VENOUS BLD VENIPUNCTURE: CPT | Performed by: SPECIALIST

## 2024-10-02 PROCEDURE — 63600175 PHARM REV CODE 636 W HCPCS: Performed by: INTERNAL MEDICINE

## 2024-10-02 PROCEDURE — S4991 NICOTINE PATCH NONLEGEND: HCPCS | Performed by: STUDENT IN AN ORGANIZED HEALTH CARE EDUCATION/TRAINING PROGRAM

## 2024-10-02 PROCEDURE — 25000003 PHARM REV CODE 250: Performed by: ORTHOPAEDIC SURGERY

## 2024-10-02 PROCEDURE — 27000207 HC ISOLATION

## 2024-10-02 PROCEDURE — 97110 THERAPEUTIC EXERCISES: CPT

## 2024-10-02 PROCEDURE — 25000003 PHARM REV CODE 250: Performed by: STUDENT IN AN ORGANIZED HEALTH CARE EDUCATION/TRAINING PROGRAM

## 2024-10-02 PROCEDURE — 80202 ASSAY OF VANCOMYCIN: CPT | Performed by: SPECIALIST

## 2024-10-02 PROCEDURE — 97116 GAIT TRAINING THERAPY: CPT

## 2024-10-02 PROCEDURE — 21400001 HC TELEMETRY ROOM

## 2024-10-02 PROCEDURE — 25000003 PHARM REV CODE 250: Performed by: INTERNAL MEDICINE

## 2024-10-02 PROCEDURE — 82565 ASSAY OF CREATININE: CPT | Performed by: STUDENT IN AN ORGANIZED HEALTH CARE EDUCATION/TRAINING PROGRAM

## 2024-10-02 PROCEDURE — 25000003 PHARM REV CODE 250: Performed by: NURSE PRACTITIONER

## 2024-10-02 RX ORDER — IBUPROFEN 200 MG
1 TABLET ORAL DAILY
Status: DISCONTINUED | OUTPATIENT
Start: 2024-10-02 | End: 2024-10-03 | Stop reason: HOSPADM

## 2024-10-02 RX ADMIN — VANCOMYCIN HYDROCHLORIDE 1250 MG: 1.25 INJECTION, POWDER, LYOPHILIZED, FOR SOLUTION INTRAVENOUS at 04:10

## 2024-10-02 RX ADMIN — OXYCODONE HYDROCHLORIDE 5 MG: 5 TABLET ORAL at 01:10

## 2024-10-02 RX ADMIN — MORPHINE SULFATE 2 MG: 2 INJECTION, SOLUTION INTRAMUSCULAR; INTRAVENOUS at 04:10

## 2024-10-02 RX ADMIN — OXYCODONE HYDROCHLORIDE 5 MG: 5 TABLET ORAL at 08:10

## 2024-10-02 RX ADMIN — SODIUM CHLORIDE 30 ML: 9 INJECTION, SOLUTION INTRAVENOUS at 08:10

## 2024-10-02 RX ADMIN — CHOLECALCIFEROL TAB 125 MCG (5000 UNIT) 5000 UNITS: 125 TAB at 09:10

## 2024-10-02 RX ADMIN — MORPHINE SULFATE 2 MG: 2 INJECTION, SOLUTION INTRAMUSCULAR; INTRAVENOUS at 10:10

## 2024-10-02 RX ADMIN — VANCOMYCIN HYDROCHLORIDE 1250 MG: 1.25 INJECTION, POWDER, LYOPHILIZED, FOR SOLUTION INTRAVENOUS at 08:10

## 2024-10-02 RX ADMIN — SENNOSIDES AND DOCUSATE SODIUM 1 TABLET: 50; 8.6 TABLET ORAL at 09:10

## 2024-10-02 RX ADMIN — SODIUM CHLORIDE: 9 INJECTION, SOLUTION INTRAVENOUS at 01:10

## 2024-10-02 RX ADMIN — MORPHINE SULFATE 2 MG: 2 INJECTION, SOLUTION INTRAMUSCULAR; INTRAVENOUS at 11:10

## 2024-10-02 RX ADMIN — OXYCODONE HYDROCHLORIDE 5 MG: 5 TABLET ORAL at 07:10

## 2024-10-02 RX ADMIN — MORPHINE SULFATE 2 MG: 2 INJECTION, SOLUTION INTRAMUSCULAR; INTRAVENOUS at 05:10

## 2024-10-02 RX ADMIN — MUPIROCIN: 20 OINTMENT TOPICAL at 09:10

## 2024-10-02 RX ADMIN — NICOTINE 1 PATCH: 21 PATCH, EXTENDED RELEASE TRANSDERMAL at 01:10

## 2024-10-02 RX ADMIN — SENNOSIDES AND DOCUSATE SODIUM 1 TABLET: 50; 8.6 TABLET ORAL at 08:10

## 2024-10-02 RX ADMIN — VANCOMYCIN HYDROCHLORIDE 1250 MG: 1.25 INJECTION, POWDER, LYOPHILIZED, FOR SOLUTION INTRAVENOUS at 01:10

## 2024-10-02 RX ADMIN — MUPIROCIN: 20 OINTMENT TOPICAL at 08:10

## 2024-10-02 NOTE — PROGRESS NOTES
[facility]  Progress Note    Patient Name: Georges Daniels  MRN: [id number]  Patient Class: IP- Inpatient   Admission Date: 9/25/2024  Length of Stay: 7 days  Attending Physician: Yousuf Gómez MD  Primary Care Provider: Sally, Primary Doctor        Subjective:     Principal Problem:Septic arthritis of knee, left        HPI:  Pt is a 37 YO  male with PMH notable for IV drug abuse, homelessness who presents to the ED for evaluation of L arm and knee pain. Pt reports that he has been having pain all over, but worse over the L arm and L knee over last day. Denies any recent trauma, injury, falls. Denies injections into joints. Reports feeling feverish over the last 4-5 days. In the ED, pt noted to have swelling to L forearm and L knee. Initial VS: Tmax 102.7, , /84, sats 100% on room air. Initial work up: WBC 15, Na 129, , procal 0.84, UDS + amphetamines. Imaging notable for: large L knee effusion, MRI of LUE showed ulnar shaft chronic fracture with callus formation, fracture incompletely healed, also has 2 loculated fluid collections at margins of callus concerning for osteomyelitis, abscesses and cellulitis. Pt underwent aspiration of L knee in the ED, aspirate yellow/cloudy with 32362 WBC with 89% segs, concerning for septic arthritis. Pt received Vanc + Cefepime in the ED. Orthopedics consulted and planning for washout of L knee and forearm today. Hospital medicine consulted for admission     Overview/Hospital Course:  Underwent washout of L knee and L forearm with ortho Dr. Lamar on 09/25/2024. Blood cultures from admission with staph in 2/2 sets, likely SST source of infection. Remains on IV Vanc + Cefepime. Infectious disease consulted for antibiotics recommendations. As blood cultures showed MRSA, Cefepime discontinued per ID. Underwent repeat I&D of LUE on 09/27 with Dr. Lamar and planned for another I&D on 09/30 per Dr. Lamar    ID  "recommend 4 week course of IV Vancomycin with EOT 10/29/2024  Will need LTAC placement for long term IV abx once ortho plans finalized. Not a candidate for home IV infusion as he is homeless and IVDU. Will consult SW to start LTAC process.     Ortho took him back to the OR on 9/30, planned for I&D of arm, reported no need for further intervention with arm. Proceeded with left Knee arthroscopy with lavage and drainage, collected fluid for cultures. Patient has been clinically accepted by an LTAC, discussed with orthopedic, recommended to hold for approx. 48H to ensure he does not require additional surgical intervention prior to transfer.     Interval History:  See hospital course    Objective:   /68 (BP Location: Right arm, Patient Position: Lying)   Pulse 92   Temp 98.6 °F (37 °C)   Resp 16   Ht 6' 1" (1.854 m)   Wt 67.6 kg (149 lb 0.5 oz)   SpO2 98%   BMI 19.66 kg/m²     Intake/Output Summary (Last 24 hours) at 10/2/2024 0752  Last data filed at 10/2/2024 0726  Gross per 24 hour   Intake 980 ml   Output 3725 ml   Net -2745 ml       PHYSICAL EXAM  Vitals reviewed  Constitutional:       General: He is not in acute distress.     Appearance: Ill appearance: chronic.   Cardiovascular:      Rate and Rhythm: Normal rate and regular rhythm.   Pulmonary:      Effort: Pulmonary effort is normal.      Breath sounds: Normal breath sounds. No wheezing or rales.   Abdominal:      General: Bowel sounds are normal. There is no distension.      Palpations: Abdomen is soft.      Tenderness: There is no abdominal tenderness.   Musculoskeletal:      Right lower leg: No edema.   Skin:     Comments: LUE AND LLE DRESSINGS IN PLACE    Neurological:      Mental Status: He is alert and oriented to person, place, and time.     LABS  All labs from the past 24 hours were reviewed.     BMP:   Recent Labs   Lab 10/01/24  0556         K 4.4      CO2 27   BUN 14   CREATININE 0.7   CALCIUM 8.7     CBC:   Recent Labs " "  Lab 10/01/24  0556   WBC 8.07   HGB 9.2*   HCT 28.8*   *     CMP:   Recent Labs   Lab 10/01/24  0556      K 4.4      CO2 27      BUN 14   CREATININE 0.7   CALCIUM 8.7   ANIONGAP 8     Cardiac Markers: No results for input(s): "CKMB", "MYOGLOBIN", "BNP", "TROPISTAT" in the last 48 hours.  Coagulation: No results for input(s): "PT", "INR", "APTT" in the last 48 hours.  Lactic Acid: No results for input(s): "LACTATE" in the last 48 hours.  Magnesium: No results for input(s): "MG" in the last 48 hours.  Troponin: No results for input(s): "TROPONINI", "TROPONINIHS" in the last 48 hours.  TSH:   No results for input(s): "TSH" in the last 4320 hours.  Urine Studies:   No results for input(s): "COLORU", "APPEARANCEUA", "PHUR", "SPECGRAV", "PROTEINUA", "GLUCUA", "KETONESU", "BILIRUBINUA", "OCCULTUA", "NITRITE", "UROBILINOGEN", "LEUKOCYTESUR", "RBCUA", "WBCUA", "BACTERIA", "SQUAMEPITHEL", "HYALINECASTS" in the last 48 hours.    Invalid input(s): "WRIGHTSUR"    IMAGING  All imaging from the past 24 hours were reviewed.     Imaging Results              MRI Lumbar Spine W WO Cont (Final result)  Result time 09/25/24 13:24:24      Final result by Darrian Zhang MD (09/25/24 13:24:24)                   Impression:      No acute abnormality or abnormal enhancement.    Multilevel degenerative changes as detailed above including faint Modic type 1 edema at the L5-S1 endplates.    No significant spinal canal stenosis.  Varying degrees of mild-to-moderate neural foraminal stenosis at the L4-L5 and L5-S1 levels.      Electronically signed by: Darrian Zhang  Date:    09/25/2024  Time:    13:24               Narrative:    EXAMINATION:  MRI LUMBAR SPINE W WO CONTRAST    CLINICAL HISTORY:  Low back pain, infection suspected;infec;    TECHNIQUE:  Multiplanar, multisequence MR images were acquired from the thoracolumbar junction to the sacrum before and after intravenous administration of 6 mL " Gadavist    COMPARISON:  None.    FINDINGS:  There are 5 non-rib-bearing lumbar vertebrae.  Alignment is unremarkable with no significant listhesis.  No acute fracture or compression deformity.  No abnormal osseous enhancement or aggressive focal signal abnormality.  Faint Modic type 1 edema noted at the L5-S1 endplates.    Conus medullaris terminates at the L2 level.  Conus medullaris is normal in size and signal.    T12-L1: No significant disc pathology.  No significant spinal canal or neural foraminal stenosis.    L1-L2: No significant disc pathology.  No significant spinal canal or neural foraminal stenosis.    L2-L3: No significant disc pathology.  No significant spinal canal or neural foraminal stenosis.    L3-L4: Mild disc desiccation and trace disc bulge.  Mild bilateral facet arthropathy.  No significant spinal canal or neural foraminal stenosis.    L4-L5: Mild disc desiccation and small circumferential disc bulge.  Mild bilateral facet arthropathy.  No significant spinal canal stenosis.  Mild left greater than right neural foraminal stenosis.    L5-S1: Disc desiccation and small circumferential disc bulge.  No significant spinal canal stenosis.  Moderate right and mild left neural foraminal stenosis.    Paraspinal soft tissues are unremarkable.  Visualized intra-abdominal and pelvic contents are also unremarkable.                                       MRI Forearm W WO Contrast Left (Final result)  Result time 09/25/24 13:43:57   Procedure changed from MRI Forearm With Contrast Left     Final result by Masood Ching MD (09/25/24 13:43:57)                   Impression:     Markedly abnormal MR left forearm.  See discussion above.    Finalized on: 9/25/2024 1:43 PM By:  Masood Ching MD  [facility]# [id number]      2024-09-25 13:46:00.030    [facility]               Narrative:    EXAM:  MRI FOREARM W WO CONTRAST LEFT    CLINICAL HISTORY:   Osteomyelitis, unspecified    TECHNIQUE:  MR left forearm with and without contrast  performed multiplanar multisequence imaging.  6 mL Gadavist.    COMPARISON STUDY:   Left forearm x-ray 09/24/2025, 06/30/2024.    FINDINGS:  There is marked motion artifact on multiple sequences limiting detail.    Again, there is a ulnar shaft chronic fracture sequela with pronounced mature callus formation, new compared with June 2024, incompletely healed with persistent fracture defect, filled in with enhancing granulation/fluid signal and marked circumferential soft tissue swelling encircling the fracture site and extending throughout the mid to distal forearm.    Additionally, there are (2) loculated nonenhancing complex fluid collections medial and lateral margins of the exuberant callus, 3.5 x 1.5 x 1 cm and 2.1 x 0.8 x 0.7 cm, with the larger collection demonstrating a sinus track extending directly to the exuberant callus formation.    There is enhancing ulnar shaft bone marrow edema with decreased T1 and increased STIR signal.    There is diffuse forearm and hand the prominent circumferential subcutaneous edema.    The findings are characteristic of osteomyelitis, soft tissue abscesses, and marked cellulitis all superimposed on ulnar shaft fracture with delayed healing.  There is concomitant infective myositis with muscle belly edema surrounding the marked periosteal inflammation.    Normal radius.                                           US Lower Extremity Veins Left (Final result)  Result time 09/25/24 09:38:54      Final result by Samanta Elder MD (09/25/24 09:38:54)                   Impression:      No evidence of deep venous thrombosis in the left lower extremity.    4.0 cm fluid collection along the medial aspect of the knee, which may represent knee effusion.      Electronically signed by: Samanta Elder  Date:    09/25/2024  Time:    09:38               Narrative:    EXAMINATION:  US LOWER EXTREMITY VEINS LEFT    CLINICAL HISTORY:  Other specified soft tissue  disorders    TECHNIQUE:  Duplex and color flow Doppler evaluation and graded compression of the left lower extremity veins was performed.    COMPARISON:  None    FINDINGS:  Left thigh veins: The common femoral, femoral, popliteal, upper greater saphenous, and deep femoral veins are patent and free of thrombus. The veins are normally compressible and have normal phasic flow and augmentation response.    Left calf veins: The visualized calf veins are patent.    Contralateral CFV: The contralateral (right) common femoral vein is patent and free of thrombus.    Miscellaneous: Fluid collection measuring 3.7 x 3.0 x 4.0 cm noted along the medial aspect of the knee, potentially representing effusion.                                       X-Ray Chest AP Portable (Final result)  Result time 09/25/24 08:51:35      Final result by Antwan Bolton MD (09/25/24 08:51:35)                   Impression:      1.  Reticular interstitial changes throughout the lungs.  Interstitial pulmonary edema versus interstitial infectious process must be considered.    2.  Negative for acute process otherwise.  Stable findings as noted above.      Electronically signed by: Antwan Bolton MD  Date:    09/25/2024  Time:    08:51               Narrative:    EXAMINATION:  XR CHEST AP PORTABLE    CLINICAL HISTORY:  Sepsis;    COMPARISON:  October 5, 2009    FINDINGS:  EKG leads overlie the chest.  Low lung volumes.  Reticular interstitial changes throughout the lungs.  The lungs are free of alveolar opacities.  The cardiac silhouette size is normal. The trachea is midline and the mediastinal width is normal. Negative for focal infiltrate, effusion or pneumothorax. Pulmonary vasculature is normal. Negative for osseous abnormalities. Tortuous aorta.  Marginal spondylosis.                                       X-Ray Forearm Left (Final result)  Result time 09/25/24 08:50:43      Final result by Antwan Bolton MD (09/25/24 08:50:43)                    Impression:      1.  Exuberant callus formation involves the distal ulnar shaft, with lucency in the ulnar shaft.  Considering the continued presence of a needle fragment in the antecubital fossa, an underlying infectious process in the ulnar shaft must be considered.  Clinical correlation is advised.  The patient may benefit from an MRI to further evaluate.    2.  Stable findings as noted above.      Electronically signed by: Antwan Bolton MD  Date:    09/25/2024  Time:    08:50               Narrative:    EXAMINATION:  XR FOREARM LEFT    CLINICAL HISTORY:  Cellulitis of left upper limb    TECHNIQUE:  AP and lateral views of the left forearm were performed.    COMPARISON:  June 30, 2024    FINDINGS:  Exuberant callus formation surrounds is a present involves the distal ulnar shaft, with lucency in the ulnar shaft.  An infectious process must be considered.    The rest of the visualized osseous structures and soft tissues are intact.    There is a needle fragment within the antecubital fossa.                                       X-Ray Knee Complete 4 or More Views Left (Final result)  Result time 09/25/24 08:46:01      Final result by Antwan Bolton MD (09/25/24 08:46:01)                   Impression:      1.  Knee joint effusion without underlying fracture seen.  Joint effusions can be associated with trauma, infection or synovitis.      Electronically signed by: Antwan Bolton MD  Date:    09/25/2024  Time:    08:46               Narrative:    EXAMINATION:  XR KNEE COMP 4 OR MORE VIEWS LEFT    CLINICAL HISTORY:  Effusion, left knee    TECHNIQUE:  AP, lateral, and bilateral oblique views of the left knee were performed.    COMPARISON:  None    FINDINGS:  There is a large knee joint effusion.  No definite underlying fracture is seen.  Joint spaces are well maintained.  Clem.                                        Assessment/Plan:      * Septic arthritis of knee, left  - s/p aspiration in the ED with 63987 WBC,  "89% polys  - orthopedics consulted- s/p washout of L knee 09/25 with Dr. Lmaar   - Continue empiric IV Vanc ; vanc dosing and monitoring per pharmacy  - aspirate  cultures  NGTD   - ID consulted and recommend IV Vanc x 4 weeks (EOT 10/29)   - pain control with PO Roxicodone and IV mOrphine as needed  - continue PT/OT         Staphylococcus aureus bacteremia  - likely skin/soft tissue source  - repeat blood cultures 09/27 NGTD   - TTE 09/26 with no vegetations noted   - ID consult for abx recommendations   - Continue IV Vancomycin       Hepatitis C antibody test positive  - confirmatory test pending, likely in setting of IVDA       Sepsis  This patient does have evidence of infective focus  My overall impression is sepsis.  Source: Skin and Soft Tissue (location L arm and L knee )  Antibiotics given-   Antibiotics (72h ago, onward)      Start     Stop Route Frequency Ordered    09/28/24 1145  mupirocin 2 % ointment         10/03/24 0859 Nasl 2 times daily 09/28/24 1034    09/27/24 1700  vancomycin 1,500 mg in D5W 250 mL IVPB (admixture device)         -- IV Every 12 hours (non-standard times) 09/27/24 1631    09/25/24 1013  vancomycin - pharmacy to dose  (vancomycin IVPB (PEDS and ADULTS))        Placed in "And" Linked Group    -- IV pharmacy to manage frequency 09/25/24 1014          Latest lactate reviewed-  Recent Labs   Lab 09/25/24  1310   LACTATE 0.8       Organ dysfunction indicated by  None     Fluid challenge s/p sepsis fluids in the Ed      Post- resuscitation assessment No - Post resuscitation assessment not needed       Will Not start Pressors   Source control achieved by: IV antibiotics, surgical washout     IV drug abuse  Prior hx of IVDA, UDS on admission + for methamphetamines   Complicating management of above conditions   Will need LTAC placement for IV abx on discharge; sw following   --patient clinically accepted by [facility], possible d/c on Thursday pending Ortho clearance      Abscess of " left arm  - MRI concerning for abscess, osteomyelitis and cellulitis of L arm   - orthopedics consulted; s/p washout 09/25/2024, 09/27/2024 with Dr. Lamar   - discussed with Dr. Lamar- pt will undergo repeat washout on 09/30/2024  - prelim op culture with MRSA   - continue IV Antibiotics as above    - ID consult for abx recommendations - ID rec 4 week course of IV Vanc (EOT 10/29/2024)       VTE Risk Mitigation (From admission, onward)           Ordered     Reason for No Pharmacological VTE Prophylaxis  Once        Question:  Reasons:  Answer:  Physician Provided (leave comment)  Comment:  surgical intervention planned    09/25/24 1434     IP VTE HIGH RISK PATIENT  Once         09/25/24 1434     Place sequential compression device  Until discontinued         09/25/24 1434                    Discharge Planning   HAYLEE:      Code Status: Full Code   Is the patient medically ready for discharge?:     Reason for patient still in hospital (select all that apply): Patient trending condition  Discharge Plan A: Jesus Gómez MD  Department of Hospital Medicine   [facility] - Telemetry ([facility])

## 2024-10-02 NOTE — SUBJECTIVE & OBJECTIVE
Interval History:   38 year old man with MRSA bacteremia  Cultures from left arm - MSSA  09/30-   He reports left knee pain .  Awaiting knee wash out today    10/01/24-  No acute events noted  Cultures-  Left ulna- staph aureus - MSSA-09/25/24  Knee- 09/25- MSSA  Blood culture - 09/25- MSSA  Blood culture- 09/27- no growth   Review of Systems   Constitutional:  Negative for activity change, appetite change, chills and diaphoresis.   Neurological:  Negative for dizziness, facial asymmetry and headaches.     Objective:     Vital Signs (Most Recent):  Temp: 98.8 °F (37.1 °C) (10/02/24 0351)  Pulse: 96 (10/02/24 0400)  Resp: 20 (10/02/24 0412)  BP: 115/73 (10/02/24 0351)  SpO2: 97 % (10/02/24 0351) Vital Signs (24h Range):  Temp:  [98.1 °F (36.7 °C)-99 °F (37.2 °C)] 98.8 °F (37.1 °C)  Pulse:  [] 96  Resp:  [16-20] 20  SpO2:  [96 %-99 %] 97 %  BP: (108-136)/(66-76) 115/73     Weight: 67.6 kg (149 lb 0.5 oz)  Body mass index is 19.66 kg/m².    Estimated Creatinine Clearance: 134.1 mL/min (based on SCr of 0.7 mg/dL).     Physical Exam  Vitals and nursing note reviewed.   HENT:      Head: Normocephalic.   Cardiovascular:      Rate and Rhythm: Normal rate.   Pulmonary:      Effort: Pulmonary effort is normal.   Musculoskeletal:      Comments: Dressing noted   Skin:     Findings: Lesion present.   Neurological:      Mental Status: He is alert.          Significant Labs: Blood Culture:   Recent Labs   Lab 09/25/24  0901 09/25/24  0903 09/27/24  0500 09/27/24  0506   LABBLOO Gram stain aer bottle: Gram positive cocci in clusters resembling Staph  Gram stain altaf bottle: Gram positive cocci in clusters resembling Staph  Results called to and read back by: Marimar Valle RN 09/26/2024  06:27  STAPHYLOCOCCUS AUREUS  [facility]  For susceptibility see order #[id number]  * Gram stain aer bottle: Gram positive cocci in clusters resembling Staph  Gram stain altaf bottle:  Gram positive cocci in clusters resembling Staph  Results called to and read back by: Marimar Valle RN 09/26/2024  06:27  STAPHYLOCOCCUS AUREUS  [facility] at [facility]  * No Growth to date  No Growth to date  No Growth to date  No Growth to date  No Growth to date No Growth to date  No Growth to date  No Growth to date  No Growth to date  No Growth to date     CBC:   Recent Labs   Lab 09/30/24  0632 10/01/24  0556   WBC 11.86 8.07   HGB 10.6* 9.2*   HCT 33.3* 28.8*   * 606*     CMP:   Recent Labs   Lab 09/30/24  0632 10/01/24  0556   * 136   K 4.4 4.4    101   CO2 25 27   GLU 91 108   BUN 8 14   CREATININE 0.7 0.7   CALCIUM 9.1 8.7   ANIONGAP 7* 8     Wound Culture:   Recent Labs   Lab 09/25/24  1746 09/25/24  1803 09/25/24  1812 09/25/24  1847 09/27/24  0759   LABAERO STAPHYLOCOCCUS AUREUS  Rare  * No growth No growth  No growth STAPHYLOCOCCUS AUREUS  Few  For susceptibility see order #[id number]  *  STAPHYLOCOCCUS AUREUS  Few  * STAPHYLOCOCCUS AUREUS  Rare  *  No growth     All pertinent labs within the past 24 hours have been reviewed.    Significant Imaging: I have reviewed all pertinent imaging results/findings within the past 24 hours.

## 2024-10-02 NOTE — PROGRESS NOTES
1 Day Post-Op       SUBJECTIVE:   Patient reports:  Arm is not hurting.      Knee is painful, but less so than before yesterday's surgery.  Is having pain when walking on the leg.  Has been trying to move it.     Asking for boost shakes.     PT/Nursing notes reviewed.       Physical Exam:   Vital Signs   Wt Readings from Last 1 Encounters:   09/29/24 67.9 kg (149 lb 11.1 oz)     Temp Readings from Last 1 Encounters:   10/01/24 98.1 °F (36.7 °C) (Oral)     BP Readings from Last 1 Encounters:   10/01/24 111/69     Pulse Readings from Last 1 Encounters:   10/01/24 95       Body mass index is 19.75 kg/m².    General Appearance:   NAD, ill appearing, cooperative    Neurologic:  Alert and oriented x3    Pysch:  Age appropriate    STATION:   Supine in bed     Musculoskeletal:     Left arm:  Dressing/splint in place with no drainage.  No erythema. Swelling significantly improved.   No ecchymosis. Distal neurovascular status intact.     Left Knee   Dressing clean/dry/intact.  .  AROM very tentative 30-90.  Calf soft NTTP.  Distal neurovascular status intact.                    LABS:   Hemoglobin   Date/Time Value Ref Range Status   10/01/2024 05:56 AM 9.2 (L) 14.0 - 18.0 g/dL Final     Hematocrit   Date/Time Value Ref Range Status   10/01/2024 05:56 AM 28.8 (L) 40.0 - 54.0 % Final       Sodium   Date/Time Value Ref Range Status   10/01/2024 05:56  136 - 145 mmol/L Final     Potassium   Date/Time Value Ref Range Status   10/01/2024 05:56 AM 4.4 3.5 - 5.1 mmol/L Final     Glucose   Date/Time Value Ref Range Status   10/01/2024 05:56  70 - 110 mg/dL Final            Assessment:         LEFT knee septic arthritis, s/p arthroscopic I/D  Left ulna infected non-union, s/p Irrigation/debridement including of fracture nonunion  Sepsis  Hepatitis C  Homelessness  H/o IVDA          DISCUSSION:   Patient's symptoms, imaging, diagnosis and prognosis reviewed and discussed.  Discussed healing progression. Case with  attending Ms. Camacho.     Patient given an opportunity to ask questions.  When his questions, were answered, the following plan was adopted.      Plan:          Continue to monitor left knee for another 24-48h from now (total 72h postop) to see if continues to improve or has another worsening.  Weight bearing:    Left leg As Tolerated   Left forearm WBAT via platform  Ice left knee/foreram as needed  Vitamin D added to regimen due to non-union of left ulna fracture  Boost shakes added to assist with nutriion  Ortho Trauma to follow             Morena Lamar DO, CAQSM, [facility]  Orthopaedic Surgeon

## 2024-10-02 NOTE — OP NOTE
Procedure Date: 9/27/2024     PREOP DIAGNOSIS:  Left forearm infected nonunion with absces     POSTOP DIAGNOSIS: Left forearm infected nonunion with absces     PROCEDURE:  Left forearm irrigation and excisional debridement of subcutaneous tissues, fascia, muscle and bone     SURGEON:  Morena Lamar DO, CAQSM    ANES:  general    EBL:   7ML    COMPLICATIONS:  NONE    TOURNIQUET TIME:  NO min @ 250mm Hg    CULTURES:  Swabs and tissue sent for gram stain/aerobic/anaerobic/AFB/fungal    PREOPERATIVE COURSE/FINDINGS:  Georges Daniels is a 38 y.o.  right hand dominant male patient who had undergone previous irrigation and debridement of his left forearm.  At the end of the previous surgery it was planned to return to the operating room for a repeat irrigation and debridement.     Risks and benefits of surgery and non-operative treatment were discussed with the patient including recurrence of the infection, futher  infection, need for more surgery, bleeding, damage to other structures including nerves/tendons/blood vessels, need for more surgery.  Patient was given an opportunity to ask questions.   When his questions were answered, he decided to proceed with surgery.  Consent was signed and saved to the chart.    The patient was seen in the preoperative holding area.  The left arm/forearm was marked with the word yes and my initials.      OPERATIVE COURSE:   The patient was taken to the operative suite and placed supine on the operative table.  General anesthesia was induced and the patient intubated.  A well padded tourniquet was placed high on the left arm.  The left arm was then sterilely prepped and draped in the standard fashion.    The timeout was then performed identifying the patient as Georges Daniels and the consented procedure as irrigation and debridement of the left arm/forearm.  It was confirmed that the left arm/forearm was indeed the prepped and draped extremity.  It was confirmed  that the patient was receiving scheduled antibiotics.    The previous incision along the subcutaneous border of the ulna was opened.  The skin flap was raised and retracted. Some improvement in color and appearance of the deep tissues was noted.  All non-viable subcutaneous tissues were sharply excised.  The wound was again irrigated. Tissue cultures were taken of bone and soft tissues.  The bone was curetted until it appeared healthy.  Bipolar cautery was used for hemostasis.   Once hemostasis was obtained, the wound was again irrigated.  The wound was then copiously irrigated with the pulse lavage.  The overall appearance of the wound was much improved compared to the previous surgery. .  The deep tissues were closed using 0 PDS.  Subcutaneous tissues were closed using 2'0 monocryl.  Skin was closed using 3'0 prolene.  Xeroform and four by fours were placed over the incision.  A volar fiberglass shortarm splint was then applied.      The patient was awakened from anesthesia. He was extubated.  He was transferred to the [facility] and taken to the pacu in satisfactory condition.       POSTOPERATIVE PLAN  DRESSING INSTRUCTIONS:  Maintain the dressing/splint until changed by ortho  ANTIBIOTICS    Continue IV antibiotics per primary team/ID, to be adjusted per cultures  ACTIVITY   Light use left arm/forearm/hand  DVT prophylaxis per primary team  Plan for repeat irrigation and debridement of the left forearm on Monday 9/30/2024

## 2024-10-02 NOTE — PT/OT/SLP PROGRESS
Physical Therapy  Treatment    Georges Daniels   MRN: [id number]   Admitting Diagnosis: Septic arthritis of knee, left       PT Start Time: 1035     PT Stop Time: 1100    PT Total Time (min): 25 min       Billable Minutes:  Gait Training 15 and Therapeutic Exercise 10    Treatment Type: Treatment  PT/PTA: PT     Number of PTA visits since last PT visit: 0       General Precautions: Standard, fall, contact  Orthopedic Precautions: LUE weight bearing as tolerated, LLE weight bearing as tolerated (LUE WBAT WITH PRW)  Braces: N/A  Respiratory Status: Room air    Spiritual, Cultural Beliefs, Cheondoism Practices, Values that Affect Care: no    Subjective:  Communicated with nursing (Christine) and performed chart review via epic system prior to session.  Pt agreeable to PT services    Pain/Comfort  Pain Rating 1: 8/10 (RLE pain)  Pain Addressed 1: Reposition, Distraction  Pain Rating Post-Intervention 1: 8/10    Objective:   Patient found with: peripheral IV, telemetry. Pt supine in bed with significant other at bedside    Functional Mobility:  Bed Mobility:    Pt performed supine <> sit independently and tolerate sitting EOB with no s/s of distress and no LOB    Transfers:   Sit<>Stand independently with no AD   Stand pivot independently with no AD, SPV with PRW     Gait:    Gait training SPV with PRW, 15 ft x 6 reps. Demonstrated slight impulsivity, decreased stance time to LLE with increased R lateral sway but no LOB.       Balance:   Dynamic Sit: GOOD-: Incosistently Maintains balance through MODERATE excursions of active trunk movement,     Dynamic stand: FAIR+: Needs CLOSE SUPERVISION during gait and is able to right self with minor LOB       Treatment and Education:  Educated pt on benefits of consistent participation in PT services to meet functional goals. Educated pt on supine/seated therex to promote strength, circulation and joint mobility. Exercises included AP, QS, toe flex/extension. Educated to perform  exercises intermittently throughout day to tolerance. Educated pt on importance of sitting OOB to promote endurance and overall activity tolerance. Educated on positioning pillow under calf to promote terminal knee extension needed for gait activity. Educated pt on call don't fall policy and use of call button to alert nursing staff of needs (including to assist in/out of bed). Pt expressed understanding.      AM-PAC 6 CLICK MOBILITY  How much help from another person does this patient currently need?   1 = Unable, Total/Dependent Assistance  2 = A lot, Maximum/Moderate Assistance  3 = A little, Minimum/Contact Guard/Supervision  4 = None, Modified Gray/Independent    Turning over in bed (including adjusting bedclothes, sheets and blankets)?: 4  Sitting down on and standing up from a chair with arms (e.g., wheelchair, bedside commode, etc.): 4  Moving from lying on back to sitting on the side of the bed?: 4  Moving to and from a bed to a chair (including a wheelchair)?: 4  Need to walk in hospital room?: 3  Climbing 3-5 steps with a railing?: 1  Basic Mobility Total Score: 20    AM-PAC Raw Score CMS G-Code Modifier Level of Impairment Assistance   6 % Total / Unable   7 - 9 CM 80 - 100% Maximal Assist   10 - 14 CL 60 - 80% Moderate Assist   15 - 19 CK 40 - 60% Moderate Assist   20 - 22 CJ 20 - 40% Minimal Assist   23 CI 1-20% SBA / CGA   24 CH 0% Independent/ Mod I     Patient left supine with all lines intact, call button in reach, nursing notified, and significant other present.    Assessment:  Georges Daniels is a 38 y.o. male with a medical diagnosis of Septic arthritis of knee, left and presents with deficits in overall mobility. Pt able to tolerate gait without AD in room minimal distance, reporting decreased pain. Pt may also benefit from cane for mobility instead of PRW. Cont POC.    Rehab identified problem list/impairments: weakness, impaired endurance, impaired functional mobility,  gait instability, impaired balance, decreased safety awareness, pain    Rehab potential is good.    Activity tolerance: Good    Discharge recommendations: High Intensity Therapy      Barriers to discharge:      Equipment recommendations: to be determined by next level of care     GOALS:   Multidisciplinary Problems       Physical Therapy Goals          Problem: Physical Therapy    Goal Priority Disciplines Outcome Interventions   Physical Therapy Goal     PT, PT/OT Progressing    Description: Goals to be met by 10/10/24.  1. Pt will complete bed mobility CGA.  2. Pt will complete sit to stand MIN A.  3. Pt will ambulate 20ft MIN A with platform RW.  4. Pt will increase AMPAC score by 2 points to progress functional mobility.                       PLAN:    Patient to be seen 3 x/week to address the above listed problems via gait training, therapeutic activities, therapeutic exercises, neuromuscular re-education  Plan of Care expires: 10/10/24  Plan of Care reviewed with: patient, significant other         10/02/2024

## 2024-10-02 NOTE — PLAN OF CARE
A244/A244 DAVFerny Daniels is a 38 y.o.male admitted on 9/25/2024 for Septic arthritis of knee, left   Code Status: Full Code MRN: [id number]   Review of patient's allergies indicates:  No Known Allergies  Past Medical History:   Diagnosis Date    Hepatitis C antibody positive in blood 09/25/2024    RNA NEGATIVE    Hyponatremia 09/25/2024    Medical history non-contributory       PRN meds    0.9% NaCl, , PRN  0.9% NaCl, , PRN  acetaminophen, 650 mg, Q4H PRN  dextrose 10%, 12.5 g, PRN  dextrose 10%, 25 g, PRN  glucagon (human recombinant), 1 mg, PRN  glucose, 16 g, PRN  glucose, 24 g, PRN  melatonin, 6 mg, Nightly PRN  morphine, 2 mg, Q4H PRN  naloxone, 0.02 mg, PRN  ondansetron, 4 mg, Q8H PRN  oxyCODONE, 5 mg, Q4H PRN  polyethylene glycol, 17 g, Daily PRN  prochlorperazine, 5 mg, Q6H PRN  sodium chloride 0.9%, 10 mL, Q12H PRN  vancomycin - pharmacy to dose, , pharmacy to manage frequency      Chart check completed. Will continue plan of care.      Orientation: oriented x 4  Jennifer Coma Scale Score: 15     Lead Monitored: Lead II Rhythm: normal sinus rhythm    Cardiac/Telemetry Box Number: [id number]  VTE Required Core Measure: Patient refused interventions Last Bowel Movement: 09/30/24  Diet Adult Regular     Geraldo Score: 20  Fall Risk Score: 10  Accucheck []   Freq?      Lines/Drains/Airways       Peripheral Intravenous Line  Duration                  Peripheral IV - Single Lumen 10/01/24 1900 22 G Posterior;Right Forearm <1 day

## 2024-10-02 NOTE — ASSESSMENT & PLAN NOTE
S/p washout of L knee and L forearm with ortho Dr. Lamar on 09/25/2024.   Prelim cultures -[facility]   On Vanco/Cefepime -  Will use cultures to adjust regime    09/28- will continue Vancomycin - this can be out patient regime if he goes to LTAC  09/30- will discuss with the primary team about disposition  Outpatient Antibiotic Therapy Plan:     Please send referral to    1) Infection:  MRSA bacteremia /septic knee     2) Discharge Antibiotics:     Intravenous antibiotics:IV vancomycin 1.250mg every 8 hours     3) Therapy Duration:  6 weeks      Estimated end date of IV antibiotics:      4) Outpatient Weekly Labs:11/12/24     Order the following labs to be drawn on Mondays:   CBC  CMP   CPK (when on Daptomycin)  ESR  CRP     Please send all labs to [facility] .    10/01- will continue Vanco for now but will need to discuss with microlab about discordant lab results,

## 2024-10-02 NOTE — PROGRESS NOTES
[facility] - Telemetry ([facility])  Infectious Disease  Progress Note    Patient Name: Georges Daniels  MRN: [id number]  Admission Date: 9/25/2024  Length of Stay: 7 days  Attending Physician: Frieda Alberto MD  Primary Care Provider: Sally, Primary Doctor    Isolation Status: Contact  Assessment/Plan:      ID  * Septic arthritis of knee, left  S/p washout of L knee and L forearm with ortho Dr. Lamar on 09/25/2024.   Prelim cultures -[facility]   On Vanco/Cefepime -  Will use cultures to adjust regime    09/28- will continue Vancomycin - this can be out patient regime if he goes to LTAC  09/30- will discuss with the primary team about disposition  Outpatient Antibiotic Therapy Plan:     Please send referral to    1) Infection:  MRSA bacteremia /septic knee     2) Discharge Antibiotics:     Intravenous antibiotics:IV vancomycin 1.250mg every 8 hours     3) Therapy Duration:  6 weeks      Estimated end date of IV antibiotics:      4) Outpatient Weekly Labs:11/12/24     Order the following labs to be drawn on Mondays:   CBC  CMP   CPK (when on Daptomycin)  ESR  CRP     Please send all labs to [facility] .    10/01- will continue Vanco for now but will need to discuss with microlab about discordant lab results,    Abscess of left arm  On Vanco/Cefepime- follow Ortho  Follow final cultures    09/28- cultures - MSSA  Blood cultures- MRSa- by PCR but cultures showed MSSA  Aerobic and anaerobic   Susceptibility     Staphylococcus aureus     CULTURE, BLOOD     Clindamycin <=0.5 mcg/mL Sensitive     Erythromycin >4 mcg/mL Resistant     Oxacillin <=0.25 mcg/mL Sensitive     Penicillin 2 mcg/mL Resistant     Tetracycline >8 mcg/mL Resistant     Trimeth/Sulfa <=0.5/9.5 m... Sensitive               Linear View         MRSA/SA Rapid ID by PCR from Blood culture [[id number]] (Abnormal) Collected: 09/25/24 0903   Order Status: Completed Updated: 09/26/24 0754    Staph aureus ID by PCR Positive Abnormal     Methicillin Resistant ID by  PCR Positive Abnormal    Narrative:     -will need to discuss with [facility] about this discrepancy-   Will continue Vancomycin for now-         Anticipated Disposition:     Thank you for your consult. I will follow-up with patient. Please contact us if you have any additional questions.    Panda Holman MD, [facility]  Infectious Disease  [facility] - Telemetry ([facility])    Subjective:     Principal Problem:Septic arthritis of knee, left    HPI: 38 year old man with PMH notable for IV drug abuse, homelessness who was admitted for left  arm and knee pain.   There was associated history of fever . T max 102.7 ..  Labs and imaging test -   WBC 15, UDS + amphetamines. Imaging notable for: large L knee effusion, MRI of LUE showed ulnar shaft chronic fracture with callus formation, fracture incompletely healed, also has 2 loculated fluid collections at margins of callus concerning for osteomyelitis, abscesses and cellulitis.   Body fluid culture- 09/25-  Body Fluid Culture, Sterile Gram stain: Gram positive cocci in clusters resembling Staph 09/26/2024     Blood culture- 09/25- [facility]   [facility]-09/25- no veg  Interval History:   38 year old man with MRSA bacteremia  Cultures from left arm - MSSA  09/30-   He reports left knee pain .  Awaiting knee wash out today    10/01/24-  No acute events noted  Cultures-  Left ulna- staph aureus - MSSA-09/25/24  Knee- 09/25- MSSA  Blood culture - 09/25- MSSA  Blood culture- 09/27- no growth   Review of Systems   Constitutional:  Negative for activity change, appetite change, chills and diaphoresis.   Neurological:  Negative for dizziness, facial asymmetry and headaches.     Objective:     Vital Signs (Most Recent):  Temp: 98.8 °F (37.1 °C) (10/02/24 0351)  Pulse: 96 (10/02/24 0400)  Resp: 20 (10/02/24 0412)  BP: 115/73 (10/02/24 0351)  SpO2: 97 % (10/02/24 0351) Vital Signs (24h Range):  Temp:  [98.1 °F (36.7 °C)-99 °F (37.2 °C)] 98.8 °F (37.1 °C)  Pulse:  [] 96  Resp:  [16-20] 20  SpO2:  [96  %-99 %] 97 %  BP: (108-136)/(66-76) 115/73     Weight: 67.6 kg (149 lb 0.5 oz)  Body mass index is 19.66 kg/m².    Estimated Creatinine Clearance: 134.1 mL/min (based on SCr of 0.7 mg/dL).     Physical Exam  Vitals and nursing note reviewed.   HENT:      Head: Normocephalic.   Cardiovascular:      Rate and Rhythm: Normal rate.   Pulmonary:      Effort: Pulmonary effort is normal.   Musculoskeletal:      Comments: Dressing noted   Skin:     Findings: Lesion present.   Neurological:      Mental Status: He is alert.          Significant Labs: Blood Culture:   Recent Labs   Lab 09/25/24  0901 09/25/24  0903 09/27/24  0500 09/27/24  0506   LABBLOO Gram stain aer bottle: Gram positive cocci in clusters resembling Staph  Gram stain altaf bottle: Gram positive cocci in clusters resembling Staph  Results called to and read back by: Marimar Valel RN 09/26/2024  06:27  STAPHYLOCOCCUS AUREUS  ID consult required at [facility].  For susceptibility see order #[id number]  * Gram stain aer bottle: Gram positive cocci in clusters resembling Staph  Gram stain altaf bottle: Gram positive cocci in clusters resembling Staph  Results called to and read back by: Marimar Valle RN 09/26/2024  06:27  STAPHYLOCOCCUS AUREUS  ID consult required at [facility].  * No Growth to date  No Growth to date  No Growth to date  No Growth to date  No Growth to date No Growth to date  No Growth to date  No Growth to date  No Growth to date  No Growth to date     CBC:   Recent Labs   Lab 09/30/24  0632 10/01/24  0556   WBC 11.86 8.07   HGB 10.6* 9.2*   HCT 33.3* 28.8*   * 606*     CMP:   Recent Labs   Lab 09/30/24  0632 10/01/24  0556   * 136   K 4.4 4.4    101   CO2 25 27   GLU 91 108   BUN 8 14   CREATININE 0.7 0.7   CALCIUM 9.1 8.7   ANIONGAP 7* 8     Wound Culture:   Recent Labs   Lab 09/25/24  1746 09/25/24  1803 09/25/24  1812 09/25/24  1847 09/27/24  0750    LABAERO STAPHYLOCOCCUS AUREUS  Rare  * No growth No growth  No growth STAPHYLOCOCCUS AUREUS  Few  For susceptibility see order #[id number]  *  STAPHYLOCOCCUS AUREUS  Few  * STAPHYLOCOCCUS AUREUS  Rare  *  No growth     All pertinent labs within the past 24 hours have been reviewed.    Significant Imaging: I have reviewed all pertinent imaging results/findings within the past 24 hours.

## 2024-10-02 NOTE — OP NOTE
"DATE OF SURGERY:  9/30/2024      PREOPERATIVE DIAGNOSIS:   Left knee septic arthritis  Left forearm infected nonunion with abscess     POSTOPERATIVE DIAGNOSIS:    Left knee septic arthritis  2.   Left forearm infected nonunion with abscess      OPERATIVE PROCEDURES:   Left knee arthroscopy with irrigation/debridement and chondroplasty   Left forearm irrigation and debridement of subcutaneous tissues, fascia, muscle and bone    SURGEON:  Morena Lamar DO    ANESTHESIA:  Gen ETA    ESTIMATED BLOOD LOSS:  5ml    COMPLICATIONS:  None.    INDICATIONS:  Geroges Daniels is a 38 y.o. 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  He had undergone irrigation and debridement of the left forearm and arthroscopic irrigation/debridement of the left knee on the day of admission.  He then underwent irrigation and debridement of the forearm only on 9/27/2024 with plans to return today for a last irrigation/debridement of the left forearm.  Over the course of thee weekend, he had increasing pain and decreasing ROM of the left knee.  Physical exam showed a large effusion.    Risks and benefits of surgery - repeat arthroscopic I/d of the knee and repeat irrigation and debridmenet of the arm, were discussed with the patient, including failure of the procedure to relieve symptoms, need for further surgery; damage to nerves, arteries, tendons, veins; risk of blood clots, pneumonia, the risk inherent to general  anesthesia.  The risk of infection was discussed with the patient.  Patient was given the opportunity to ask questions.  When his questions were satisfactorily answered, he decided to proceed with surgery.  The consent was signed electronically and saved to the electronic health record.      The patient was seen and identified in the Preoperative Holding Area.  The left knee and left forearm were marked with the word "yes" and my " initials.      DESCRIPTION OF THE PROCEDURE:  The patient was taken to the Operative Suite and placed supine on the operative table. Anesthesia was induced and the patient was intubated.     Once the patient was anesthetized, he was positioned on the table with the foot end of the table bent to 90 degrees.   The operative leg was placed in the leg woods.  The non-operative leg was placed over padding.      The entire right leg and foot were then sterilely prepped and draped in the standard fashion.     A timeout was performed identifying the patient as Georges Daniels and the consented procedure as left knee arthroscopic irrigation and debridement.  It was confirmed that the patient would not receive perioperative antibiotics until cultures were taken.     Medial and lateral parapatellar portals were established. Once the obturator had been removed from the camera trocar, there was a moderate effusion of synovial fluid drained which was still very cloudy..  This fluid was collected for cell count, crystals, and cultures.  The camera was then inserted into the joint and the joint survey carried out.  The synovium was noted to less injected and inflamed then on the day of admission.     Attention was turned to the medial joint space and the medial meniscus was noted to be intact.  The cartilage was noted be stable.   The meniscus was intact.     The articular cartilage of the tibial plateau was noted to be in good condition.     Attention was then turned to the intercondylar notch and the ACL was noted to be intact.    Attention was then turned to the lateral compartment where the meniscus was noted to be intact.   The articular cartilage of the lateral femoral condyle and lateral tibial plateau was noted to be in pristine condition.        The knee was then copiously irrigated for a total of 9 liters.  It was also agitated to ensure any loose pieces were removed.  The gutters were inspected and revealed no  loose bodies.  After copious irrigation, all instruments were removed.      The incisions were closed using 3-0 nylon in a figure-of-eight fashion.  A sterile ankle-to-groin dressing was then placed.    The sterile field was broken down, and the patient positioned for the irrigation and debridement of the arm.     ------------------------------------------------------    A well padded tourniquet was placed high on the left arm.  The left arm was then sterilely prepped and draped in the standard fashion.    The timeout was then performed identifying the patient as Georges Daniels and the consented procedure as irrigation and debridement of the left hand.   It was confirmed that the left hand were indeed the prepped and draped extremity.  It was confirmed that the patient received perioperative antibiotics.    The previous incision that was made along the subcutaneous border of the ulna was open.  The skin flap was raised and retracted.  Cultures were then taken.   There was healthy looking muscle and tissues and bone.  No purulence was encountered. Minimal excisional debridement of periosteum and fascia was done.   Bipolar cautery was used for hemostasis.   Once hemostasis was obtained, the wound was again irrigated. Soft tissues were excised until they appeared healthy including subcutaneous tissues, fascia, muscles and bone.  The wound was then copiously irrigated with the pulse lavage .  The deep tissues were closed using 0 PDS.  Subcutaneous tissues were closed using 2'0 monocryl.  Skin was closed using 3'0 prolene.  Xeroform and four by fours were placed over the incision.  A volar fiberglass shortarm splint was then applied.    ----------------------------------------------    The patient was awakened from anesthesia.   He was transferred to the [facility].   He was taken to the Recovery Room in satisfactory condition.       POSTOPERATIVE PLAN  WBAT Left leg  WBAT via plateform walker left arm  Maintain  dressing on left leg and splint on left arm until changed by orthopedics  PT eval/treat  DVT prophylaxis x6 weeks  No further surgeries planned at this time for the forearm  Will follow the knee clinically for the need for more surgery given how quickly he developed the recurrent effusion.

## 2024-10-02 NOTE — PLAN OF CARE
Plan of care discussed with pt. Pt verbalized understanding. Free from injury. No s/s of distress noted. Vitals stable. IV abx. Meds as ordered. Pain controlled. Diet tolerated. Tele monitor in place. Q2 hour rounding. No complaints. Will continue to monitor pt. 12 hour chart review.

## 2024-10-02 NOTE — PROGRESS NOTES
Pharmacokinetic Assessment Follow Up: IV Vancomycin    Vancomycin serum concentration assessment(s):    The trough level was drawn correctly and can be used to guide therapy at this time. The measurement is within the desired definitive target range of 15 to 20 mcg/mL.    Vancomycin Regimen Plan:    Continue regimen to Vancomycin 1250 mg IV every 8 hours with next serum trough concentration measured at 0430 prior to next dose on 10/3    Drug levels (last 3 results):  Recent Labs   Lab Result Units 09/30/24  0633 10/01/24  1156 10/02/24  1146   Vancomycin-Trough ug/mL 15.0 13.8 17.5       Pharmacy will continue to follow and monitor vancomycin.    Please contact pharmacy at extension [phone] for questions regarding this assessment.    Thank you for the consult,   Magi Mora       Patient brief summary:  Georges Daniels is a 38 y.o. male initiated on antimicrobial therapy with IV Vancomycin for treatment of bacteremia/septic arthritis    Drug Allergies:   Review of patient's allergies indicates:  No Known Allergies    Actual Body Weight:   67.6 kg    Renal Function:   Estimated Creatinine Clearance: 134.1 mL/min (based on SCr of 0.7 mg/dL).,     Dialysis Method (if applicable):  N/A    CBC (last 72 hours):  Recent Labs   Lab Result Units 09/30/24  0632 10/01/24  0556   WBC K/uL 11.86 8.07   Hemoglobin g/dL 10.6* 9.2*   Hematocrit % 33.3* 28.8*   Platelets K/uL 706* 606*   Gran % % 54.0 61.6   Lymph % % 32.5 24.8   Mono % % 9.4 10.5   Eosinophil % % 1.4 1.0   Basophil % % 0.8 0.5   Differential Method  Automated Automated       Metabolic Panel (last 72 hours):  Recent Labs   Lab Result Units 09/30/24  0632 10/01/24  0556   Sodium mmol/L 133* 136   Potassium mmol/L 4.4 4.4   Chloride mmol/L 101 101   CO2 mmol/L 25 27   Glucose mg/dL 91 108   BUN mg/dL 8 14   Creatinine mg/dL 0.7 0.7       Vancomycin Administrations:  vancomycin given in the last 96 hours                     vancomycin 1,250 mg in D5W 250 mL  IVPB (admixture device) (mg) 1,250 mg New Bag 10/02/24 1337     1,250 mg New Bag  0412     1,250 mg New Bag 10/01/24 2107     1,250 mg New Bag  1248     1,250 mg New Bag  0421     1,250 mg New Bag 09/30/24 2044     1,250 mg New Bag  0734     1,250 mg New Bag 09/29/24 2252     1,250 mg New Bag  1432     1,250 mg New Bag  0814    vancomycin 1,500 mg in D5W 250 mL IVPB (admixture device) ()  Restarted 09/28/24 1637     1,500 mg New Bag  1632                    Microbiologic Results:  Microbiology Results (last 7 days)       Procedure Component Value Units Date/Time    Blood culture [[id number]] Collected: 09/27/24 0506    Order Status: Completed Specimen: Blood Updated: 10/02/24 1222     Blood Culture, Routine No growth after 5 days.    Blood culture [[id number]] Collected: 09/27/24 0500    Order Status: Completed Specimen: Blood Updated: 10/02/24 1222     Blood Culture, Routine No growth after 5 days.    Culture, Anaerobe [[id number]] Collected: 09/27/24 0759    Order Status: Completed Specimen: Wound from Arm, Left Updated: 10/02/24 1121     Anaerobic Culture Culture in progress    Narrative:      Left Forearm    Culture, Body Fluid (Aerobic) w/ GS [[id number]] Collected: 09/30/24 1529    Order Status: Completed Specimen: Joint Fluid from Knee, Left Updated: 10/02/24 1008     AEROBIC CULTURE - FLUID No growth     Gram Stain Result Many WBC's      No organisms seen    Narrative:      Synovial fluid    AFB Culture & Smear [[id number]] Collected: 09/30/24 1529    Order Status: Completed Specimen: Joint Fluid from Knee, Left Updated: 10/02/24 0927     AFB Culture & Smear Culture in progress     AFB CULTURE STAIN No acid fast bacilli seen.    Narrative:      Synovial fluid    Culture, Anaerobic [[id number]] Collected: 09/30/24 1529    Order Status: Completed Specimen: Joint Fluid from Knee, Left Updated: 10/02/24 0748     Anaerobic Culture Culture in progress    Narrative:      Synovial fluid    Aerobic culture  [[id number]]  (Abnormal)  (Susceptibility) Collected: 09/27/24 0759    Order Status: Completed Specimen: Wound from Arm, Left Updated: 10/01/24 1005     Aerobic Bacterial Culture STAPHYLOCOCCUS AUREUS  Rare      Narrative:      Left Ulna    Aerobic culture [[id number]] Collected: 09/27/24 0759    Order Status: Completed Specimen: Wound from Arm, Left Updated: 10/01/24 0937     Aerobic Bacterial Culture No growth    Narrative:      Left Forearm    Culture, Anaerobe [[id number]] Collected: 09/27/24 0759    Order Status: Completed Specimen: Wound from Arm, Left Updated: 10/01/24 0845     Anaerobic Culture Culture in progress    Narrative:      Left Ulna    Fungus culture [[id number]] Collected: 09/30/24 1529    Order Status: Sent Specimen: Joint Fluid from Knee, Left Updated: 10/01/24 0032    AFB Culture & Smear [[id number]] Collected: 09/27/24 0759    Order Status: Completed Specimen: Wound from Arm, Left Updated: 09/30/24 1602     AFB Culture & Smear Culture in progress     AFB CULTURE STAIN No acid fast bacilli seen.    Narrative:      Left Ulna    AFB Culture & Smear [[id number]] Collected: 09/27/24 0759    Order Status: Completed Specimen: Wound from Arm, Left Updated: 09/30/24 1602     AFB Culture & Smear Culture in progress     AFB CULTURE STAIN No acid fast bacilli seen.    Narrative:      Left Forearm    Gram stain [[id number]] Collected: 09/30/24 1529    Order Status: Canceled Specimen: Joint Fluid from Knee, Left     Aerobic culture [[id number]] Collected: 09/25/24 1812    Order Status: Completed Specimen: Wound from Arm, Left Updated: 09/30/24 0928     Aerobic Bacterial Culture No growth    Narrative:      Left ulna    Aerobic culture [[id number]] Collected: 09/25/24 1812    Order Status: Completed Specimen: Wound from Arm, Left Updated: 09/30/24 0911     Aerobic Bacterial Culture No growth    Narrative:      Left forearm near callous    Aerobic culture [[id number]] Collected: 09/25/24 1803    Order Status:  Completed Specimen: Wound from Arm, Left Updated: 09/30/24 0905     Aerobic Bacterial Culture No growth    Narrative:      Left forearm superficial    Culture, Anaerobe [[id number]] Collected: 09/25/24 1847    Order Status: Completed Specimen: Tissue from Arm, Left Updated: 09/30/24 0851     Anaerobic Culture No anaerobes isolated    Narrative:      Left forearm deep tissue    Culture, Anaerobe [[id number]] Collected: 09/25/24 1812    Order Status: Completed Specimen: Wound from Arm, Left Updated: 09/30/24 0850     Anaerobic Culture Culture in progress    Narrative:      Left forearm near callous    Culture, Anaerobe [[id number]] Collected: 09/25/24 1803    Order Status: Completed Specimen: Wound from Arm, Left Updated: 09/30/24 0849     Anaerobic Culture Culture in progress    Narrative:      Left forearm superficial    Culture, Anaerobe [[id number]] Collected: 09/25/24 1746    Order Status: Completed Specimen: Joint Fluid from Knee, Left Updated: 09/30/24 0848     Anaerobic Culture No anaerobes isolated    Culture, Anaerobe [[id number]] Collected: 09/25/24 1847    Order Status: Completed Specimen: Tissue from Arm, Left Updated: 09/30/24 0847     Anaerobic Culture No anaerobes isolated    Narrative:      Left forearm soft tissue    Culture, Anaerobe [[id number]] Collected: 09/25/24 1812    Order Status: Completed Specimen: Wound from Arm, Left Updated: 09/30/24 0846     Anaerobic Culture Culture in progress    Narrative:      Left ulna    Aerobic culture [[id number]]  (Abnormal)  (Susceptibility) Collected: 09/25/24 1746    Order Status: Completed Specimen: Wound from Knee, Left Updated: 09/30/24 0655     Aerobic Bacterial Culture STAPHYLOCOCCUS AUREUS  Rare      Blood culture x two cultures. Draw prior to antibiotics. [[id number]]  (Abnormal) Collected: 09/25/24 0901    Order Status: Completed Specimen: Blood from Peripheral, Forearm, Right Updated: 09/29/24 0920     Blood Culture, Routine Gram stain aer bottle:  Gram positive cocci in clusters resembling Staph      Gram stain altaf bottle: Gram positive cocci in clusters resembling Staph      Results called to and read back by: Marimar Valle RN 09/26/2024  06:27      STAPHYLOCOCCUS AUREUS  ID consult required at [facility].  For susceptibility see order #[id number]      Narrative:      Aerobic and anaerobic    Blood culture x two cultures. Draw prior to antibiotics. [[id number]]  (Abnormal)  (Susceptibility) Collected: 09/25/24 0903    Order Status: Completed Specimen: Blood from Peripheral, Antecubital, Left Updated: 09/29/24 0919     Blood Culture, Routine Gram stain aer bottle: Gram positive cocci in clusters resembling Staph      Gram stain altaf bottle: Gram positive cocci in clusters resembling Staph      Results called to and read back by: Marimar Valle RN 09/26/2024  06:27      STAPHYLOCOCCUS AUREUS  ID consult required at [facility] and [facility] locations.      Narrative:      Aerobic and anaerobic    Culture, Body Fluid - Bactec [[id number]]  (Abnormal)  (Susceptibility) Collected: 09/25/24 1009    Order Status: Completed Specimen: Joint Fluid from Knee, Left Updated: 09/29/24 0919     Body Fluid Culture, Sterile Gram stain: Gram positive cocci in clusters resembling Staph 09/26/2024      20:56      STAPHYLOCOCCUS AUREUS    Aerobic culture [[id number]]  (Abnormal) Collected: 09/25/24 1847    Order Status: Completed Specimen: Wound from Arm, Left Updated: 09/28/24 1324     Aerobic Bacterial Culture STAPHYLOCOCCUS AUREUS  Few  For susceptibility see order #[id number]      Narrative:      Left forearm soft tissue    Aerobic culture [[id number]]  (Abnormal)  (Susceptibility) Collected: 09/25/24 1847    Order Status: Completed Specimen: Wound from Arm, Left Updated: 09/28/24 1323     Aerobic Bacterial Culture STAPHYLOCOCCUS AUREUS  Few      Narrative:      Left forearm deep tissue    Gram stain [[id number]] Collected: 09/27/24  0759    Order Status: Completed Specimen: Wound from Arm, Left Updated: 09/28/24 0335     Gram Stain Result No WBC's      No organisms seen    Narrative:      Left Forearm    Gram stain [[id number]] Collected: 09/27/24 0759    Order Status: Completed Specimen: Wound from Arm, Left Updated: 09/27/24 2132     Gram Stain Result No WBC's      No organisms seen    Narrative:      Left Ulna    Fungus culture [[id number]] Collected: 09/27/24 0759    Order Status: Sent Specimen: Wound from Arm, Left Updated: 09/27/24 1700    Fungus culture [[id number]] Collected: 09/27/24 0759    Order Status: Sent Specimen: Wound from Arm, Left Updated: 09/27/24 1700    AFB Culture & Smear [[id number]] Collected: 09/25/24 1847    Order Status: Completed Specimen: Tissue from Arm, Left Updated: 09/27/24 0927     AFB Culture & Smear Culture in progress     AFB CULTURE STAIN No acid fast bacilli seen.    Narrative:      Left forearm deep tissue    AFB Culture & Smear [[id number]] Collected: 09/25/24 1746    Order Status: Completed Specimen: Joint Fluid from Knee, Left Updated: 09/27/24 0927     AFB Culture & Smear Culture in progress     AFB CULTURE STAIN No acid fast bacilli seen.    AFB Culture & Smear [[id number]] Collected: 09/25/24 1803    Order Status: Completed Specimen: Wound from Arm, Left Updated: 09/27/24 0927     AFB Culture & Smear Culture in progress     AFB CULTURE STAIN No acid fast bacilli seen.    Narrative:      Left forearm superficial    AFB Culture & Smear [[id number]] Collected: 09/25/24 1812    Order Status: Completed Specimen: Wound from Arm, Left Updated: 09/27/24 0927     AFB Culture & Smear Culture in progress     AFB CULTURE STAIN No acid fast bacilli seen.    Narrative:      Left forearm near callous    AFB Culture & Smear [[id number]] Collected: 09/25/24 1847    Order Status: Completed Specimen: Tissue from Arm, Left Updated: 09/27/24 0927     AFB Culture & Smear Culture in progress     AFB CULTURE STAIN No acid  fast bacilli seen.    Narrative:      Left forearm soft tissue    AFB Culture & Smear [[id number]] Collected: 09/25/24 1812    Order Status: Completed Specimen: Wound from Arm, Left Updated: 09/27/24 0927     AFB Culture & Smear Culture in progress     AFB CULTURE STAIN No acid fast bacilli seen.    Narrative:      Left ulna    AFB Culture & Smear [[id number]] Collected: 09/25/24 1009    Order Status: Completed Specimen: Joint Fluid from Knee, Left Updated: 09/27/24 0927     AFB Culture & Smear Culture in progress     AFB CULTURE STAIN No acid fast bacilli seen.    Gram stain [[id number]] Collected: 09/25/24 1746    Order Status: Completed Specimen: Joint Fluid from Knee, Left Updated: 09/26/24 0930     Gram Stain Result Many WBC's      No organisms seen    Gram stain [[id number]] Collected: 09/25/24 1812    Order Status: Completed Specimen: Wound from Arm, Left Updated: 09/26/24 0919     Gram Stain Result No WBC's      No organisms seen    Narrative:      Left ulna    Gram stain [[id number]] Collected: 09/25/24 1847    Order Status: Completed Specimen: Tissue from Arm, Left Updated: 09/26/24 0914     Gram Stain Result Rare WBC's      No organisms seen    Narrative:      Left forearm soft tissue    Gram stain [[id number]] Collected: 09/25/24 1812    Order Status: Completed Specimen: Wound from Arm, Left Updated: 09/26/24 0807     Gram Stain Result No WBC's      No organisms seen    Narrative:      Left forearm near callous    Gram stain [[id number]] Collected: 09/25/24 1847    Order Status: Completed Specimen: Tissue from Arm, Left Updated: 09/26/24 0802     Gram Stain Result Rare WBC's      Rare Gram positive cocci    Narrative:      Left forearm deep tissue    Gram stain [[id number]] Collected: 09/25/24 1803    Order Status: Completed Specimen: Wound from Arm, Left Updated: 09/26/24 0800     Gram Stain Result Rare WBC's      No organisms seen    Narrative:      Left forearm superficial    MRSA/SA Rapid ID by PCR  from Blood culture [[id number]]  (Abnormal) Collected: 09/25/24 0903    Order Status: Completed Updated: 09/26/24 0754     Staph aureus ID by PCR Positive     Methicillin Resistant ID by PCR Positive    Narrative:      Aerobic and anaerobic    Fungus culture [[id number]] Collected: 09/25/24 1847    Order Status: Sent Specimen: Tissue from Arm, Left Updated: 09/26/24 0155    Fungus culture [[id number]] Collected: 09/25/24 1847    Order Status: Sent Specimen: Tissue from Arm, Left Updated: 09/26/24 0155    Fungus culture [[id number]] Collected: 09/25/24 1746    Order Status: Sent Specimen: Joint Fluid from Knee, Left Updated: 09/26/24 0155    Fungus culture [[id number]] Collected: 09/25/24 1803    Order Status: Sent Specimen: Wound from Arm, Left Updated: 09/26/24 0155    Fungus culture [[id number]] Collected: 09/25/24 1812    Order Status: Sent Specimen: Wound from Arm, Left Updated: 09/26/24 0155    Fungus culture [[id number]] Collected: 09/25/24 1812    Order Status: Sent Specimen: Wound from Arm, Left Updated: 09/26/24 0155    Fungus culture [[id number]] Collected: 09/25/24 1009    Order Status: Sent Specimen: Joint Fluid from Knee, Left Updated: 09/25/24 2138    Culture, Anaerobe [[id number]] Collected: 09/25/24 1813    Order Status: Sent Specimen: Wound from Arm, Left Updated: 09/25/24 1813    AFB Culture & Smear [[id number]] Collected: 09/25/24 1813    Order Status: Sent Specimen: Wound from Arm, Left Updated: 09/25/24 1813    Gram stain [[id number]] Collected: 09/25/24 1813    Order Status: Sent Specimen: Wound from Arm, Left Updated: 09/25/24 1813    Fungus culture [[id number]] Collected: 09/25/24 1813    Order Status: Sent Specimen: Wound from Arm, Left Updated: 09/25/24 1813    Aerobic culture [[id number]] Collected: 09/25/24 1813    Order Status: Sent Specimen: Wound from Arm, Left Updated: 09/25/24 4195

## 2024-10-02 NOTE — PLAN OF CARE
TX COMPLETED: facilitated bed mobility independently, transfers independently, ambulated 15 ft x 6 reps with L PRW. Recommend continued PT services.

## 2024-10-02 NOTE — PLAN OF CARE
10/02/24 1352   Post-Acute Status   Post-Acute Authorization Placement   Post-Acute Placement Status Pending payor review/awaiting authorization (if required)   Discharge Delays None known at this time   Discharge Plan   Discharge Plan A Long-term acute care facility (LTAC)     [facility] notified to submit for authorization; updated clinicals provided to facility via [facility]. DC anticipated within 24hrs pending ortho clearance.  SW to follow

## 2024-10-02 NOTE — PROGRESS NOTES
2 Days Post-Op       SUBJECTIVE:   Patient reports:  Feeling very much better.  Knee moving better today and has been able to walk more.      Forearm not hurting.     PT/Nursing notes reviewed.       Physical Exam:   Vital Signs   Wt Readings from Last 1 Encounters:   10/02/24 67.6 kg (149 lb 0.5 oz)     Temp Readings from Last 1 Encounters:   10/02/24 97.8 °F (36.6 °C)     BP Readings from Last 1 Encounters:   10/02/24 117/75     Pulse Readings from Last 1 Encounters:   10/02/24 100       Body mass index is 19.66 kg/m².    General Appearance:   NAD, less ill appearing than one week ago, cooperative    Neurologic:  Alert and oriented x3    Pysch:  Age appropriate    STATION:   Seated in bed with left leg extended    Musculoskeletal:      Left arm:  Dressing/splint in place with no drainage.  No erythema. Swelling even more improved.   No ecchymosis. Distal neurovascular status intact.     Left Knee   Dressing clean/dry/intact.  .  AROM 0-90 slowly.  Calf soft NTTP.  Distal neurovascular status intact.                                     LABS:   Hemoglobin   Date/Time Value Ref Range Status   10/01/2024 05:56 AM 9.2 (L) 14.0 - 18.0 g/dL Final     Hematocrit   Date/Time Value Ref Range Status   10/01/2024 05:56 AM 28.8 (L) 40.0 - 54.0 % Final       Sodium   Date/Time Value Ref Range Status   10/01/2024 05:56  136 - 145 mmol/L Final     Potassium   Date/Time Value Ref Range Status   10/01/2024 05:56 AM 4.4 3.5 - 5.1 mmol/L Final     Glucose   Date/Time Value Ref Range Status   10/01/2024 05:56  70 - 110 mg/dL Final          Assessment:      LEFT knee septic arthritis, s/p arthroscopic I/D  Left ulna infected non-union, s/p Irrigation/debridement including of fracture nonunion  Sepsis  Hepatitis C  Homelessness  H/o IVDA          DISCUSSION:   Patient's symptoms, imaging, diagnosis and prognosis reviewed and discussed.  Discussed healing progression. Case discussed with attending hospitalist and  ID.    Patient given an opportunity to ask questions.  When his questions, were answered, the following plan was adopted.      Plan:         Continue to monitor left knee for another 24h from now (total 72h postop) to see if continues to improve or has another worsening.  Weight bearing:    Left leg As Tolerated   Left forearm WBAT via platform  Ice left knee/foreram as needed  Continue Vitamin D  Ortho Trauma to follow        Morena Lamar DO, CAQSM, FAOAO  Orthopaedic Surgeon

## 2024-10-02 NOTE — ASSESSMENT & PLAN NOTE
On Vanco/Cefepime- follow Ortho  Follow final cultures    09/28- cultures - MSSA  Blood cultures- MRSa- by PCR but cultures showed MSSA  Aerobic and anaerobic   Susceptibility     Staphylococcus aureus     CULTURE, BLOOD     Clindamycin <=0.5 mcg/mL Sensitive     Erythromycin >4 mcg/mL Resistant     Oxacillin <=0.25 mcg/mL Sensitive     Penicillin 2 mcg/mL Resistant     Tetracycline >8 mcg/mL Resistant     Trimeth/Sulfa <=0.5/9.5 m... Sensitive               Linear View         MRSA/SA Rapid ID by PCR from Blood culture [[id number]] (Abnormal) Collected: 09/25/24 0903   Order Status: Completed Updated: 09/26/24 0754    Staph aureus ID by PCR Positive Abnormal     Methicillin Resistant ID by PCR Positive Abnormal    Narrative:     -will need to discuss with [facility] about this discrepancy-   Will continue Vancomycin for now-

## 2024-10-03 VITALS
RESPIRATION RATE: 18 BRPM | OXYGEN SATURATION: 98 % | HEART RATE: 97 BPM | SYSTOLIC BLOOD PRESSURE: 128 MMHG | BODY MASS INDEX: 19.75 KG/M2 | WEIGHT: 149.06 LBS | DIASTOLIC BLOOD PRESSURE: 74 MMHG | TEMPERATURE: 99 F | HEIGHT: 73 IN

## 2024-10-03 LAB
ANION GAP SERPL CALC-SCNC: 10 MMOL/L (ref 8–16)
BACTERIA SPEC ANAEROBE CULT: NORMAL
BUN SERPL-MCNC: 20 MG/DL (ref 6–20)
CALCIUM SERPL-MCNC: 9 MG/DL (ref 8.7–10.5)
CHLORIDE SERPL-SCNC: 100 MMOL/L (ref 95–110)
CO2 SERPL-SCNC: 25 MMOL/L (ref 23–29)
CREAT SERPL-MCNC: 0.7 MG/DL (ref 0.5–1.4)
EST. GFR  (NO RACE VARIABLE): >60 ML/MIN/1.73 M^2
GLUCOSE SERPL-MCNC: 98 MG/DL (ref 70–110)
POTASSIUM SERPL-SCNC: 4.9 MMOL/L (ref 3.5–5.1)
SODIUM SERPL-SCNC: 135 MMOL/L (ref 136–145)
VANCOMYCIN TROUGH SERPL-MCNC: 20.4 UG/ML (ref 10–22)

## 2024-10-03 PROCEDURE — 36415 COLL VENOUS BLD VENIPUNCTURE: CPT | Performed by: STUDENT IN AN ORGANIZED HEALTH CARE EDUCATION/TRAINING PROGRAM

## 2024-10-03 PROCEDURE — 25000003 PHARM REV CODE 250: Performed by: NURSE PRACTITIONER

## 2024-10-03 PROCEDURE — 25000003 PHARM REV CODE 250: Performed by: INTERNAL MEDICINE

## 2024-10-03 PROCEDURE — 02HV33Z INSERTION OF INFUSION DEVICE INTO SUPERIOR VENA CAVA, PERCUTANEOUS APPROACH: ICD-10-PCS | Performed by: STUDENT IN AN ORGANIZED HEALTH CARE EDUCATION/TRAINING PROGRAM

## 2024-10-03 PROCEDURE — 63600175 PHARM REV CODE 636 W HCPCS: Performed by: INTERNAL MEDICINE

## 2024-10-03 PROCEDURE — 97116 GAIT TRAINING THERAPY: CPT

## 2024-10-03 PROCEDURE — 80202 ASSAY OF VANCOMYCIN: CPT | Performed by: STUDENT IN AN ORGANIZED HEALTH CARE EDUCATION/TRAINING PROGRAM

## 2024-10-03 PROCEDURE — S4991 NICOTINE PATCH NONLEGEND: HCPCS | Performed by: STUDENT IN AN ORGANIZED HEALTH CARE EDUCATION/TRAINING PROGRAM

## 2024-10-03 PROCEDURE — 97110 THERAPEUTIC EXERCISES: CPT

## 2024-10-03 PROCEDURE — 36569 INSJ PICC 5 YR+ W/O IMAGING: CPT

## 2024-10-03 PROCEDURE — 80048 BASIC METABOLIC PNL TOTAL CA: CPT | Performed by: STUDENT IN AN ORGANIZED HEALTH CARE EDUCATION/TRAINING PROGRAM

## 2024-10-03 PROCEDURE — 99024 POSTOP FOLLOW-UP VISIT: CPT | Mod: ,,, | Performed by: ORTHOPAEDIC SURGERY

## 2024-10-03 PROCEDURE — 25000003 PHARM REV CODE 250: Performed by: STUDENT IN AN ORGANIZED HEALTH CARE EDUCATION/TRAINING PROGRAM

## 2024-10-03 PROCEDURE — 25000003 PHARM REV CODE 250: Performed by: ORTHOPAEDIC SURGERY

## 2024-10-03 PROCEDURE — C1751 CATH, INF, PER/CENT/MIDLINE: HCPCS

## 2024-10-03 RX ORDER — SODIUM CHLORIDE 0.9 % (FLUSH) 0.9 %
10 SYRINGE (ML) INJECTION
Status: DISCONTINUED | OUTPATIENT
Start: 2024-10-03 | End: 2024-10-03 | Stop reason: HOSPADM

## 2024-10-03 RX ORDER — SODIUM CHLORIDE 0.9 % (FLUSH) 0.9 %
10 SYRINGE (ML) INJECTION EVERY 6 HOURS
Status: DISCONTINUED | OUTPATIENT
Start: 2024-10-03 | End: 2024-10-03 | Stop reason: HOSPADM

## 2024-10-03 RX ORDER — ACETAMINOPHEN 325 MG/1
650 TABLET ORAL EVERY 4 HOURS PRN
Start: 2024-10-03

## 2024-10-03 RX ORDER — OXYCODONE HYDROCHLORIDE 5 MG/1
5 TABLET ORAL EVERY 6 HOURS PRN
Qty: 28 TABLET | Refills: 0 | Status: SHIPPED | OUTPATIENT
Start: 2024-10-03 | End: 2024-10-10

## 2024-10-03 RX ADMIN — VANCOMYCIN HYDROCHLORIDE 1000 MG: 1 INJECTION, POWDER, LYOPHILIZED, FOR SOLUTION INTRAVENOUS at 03:10

## 2024-10-03 RX ADMIN — OXYCODONE HYDROCHLORIDE 5 MG: 5 TABLET ORAL at 03:10

## 2024-10-03 RX ADMIN — MORPHINE SULFATE 2 MG: 2 INJECTION, SOLUTION INTRAMUSCULAR; INTRAVENOUS at 06:10

## 2024-10-03 RX ADMIN — CHOLECALCIFEROL TAB 125 MCG (5000 UNIT) 5000 UNITS: 125 TAB at 09:10

## 2024-10-03 RX ADMIN — SENNOSIDES AND DOCUSATE SODIUM 1 TABLET: 50; 8.6 TABLET ORAL at 09:10

## 2024-10-03 RX ADMIN — OXYCODONE HYDROCHLORIDE 5 MG: 5 TABLET ORAL at 09:10

## 2024-10-03 RX ADMIN — VANCOMYCIN HYDROCHLORIDE 1000 MG: 1 INJECTION, POWDER, LYOPHILIZED, FOR SOLUTION INTRAVENOUS at 06:10

## 2024-10-03 RX ADMIN — SODIUM CHLORIDE 30 ML: 9 INJECTION, SOLUTION INTRAVENOUS at 06:10

## 2024-10-03 RX ADMIN — MORPHINE SULFATE 2 MG: 2 INJECTION, SOLUTION INTRAMUSCULAR; INTRAVENOUS at 03:10

## 2024-10-03 RX ADMIN — NICOTINE 1 PATCH: 21 PATCH, EXTENDED RELEASE TRANSDERMAL at 09:10

## 2024-10-03 NOTE — PROGRESS NOTES
Pharmacokinetic Assessment Follow Up: IV Vancomycin    Vancomycin serum concentration assessment(s):    The trough level was drawn correctly and can be used to guide therapy at this time. The measurement is above the desired definitive target range of 15 to 20 mcg/mL.    Vancomycin Regimen Plan:    Change regimen to Vancomycin 1000 mg IV every 24 hours with next serum trough concentration measured at 0530 prior to fourth dose on 10/4/24.    Drug levels (last 3 results):  Recent Labs   Lab Result Units 10/01/24  1156 10/02/24  1146 10/03/24  0418   Vancomycin-Trough ug/mL 13.8 17.5 20.4       Pharmacy will continue to follow and monitor vancomycin.    Please contact pharmacy at extension [phone] for questions regarding this assessment.    Thank you for the consult,   Fernandez Stockton       Patient brief summary:  Georges Daniels is a 38 y.o. male initiated on antimicrobial therapy with IV Vancomycin for treatment of bone/joint infection and bacteremia.    Drug Allergies:   Review of patient's allergies indicates:  No Known Allergies    Actual Body Weight:   67.6 kg    Renal Function:   Estimated Creatinine Clearance: 134.1 mL/min (based on SCr of 0.7 mg/dL).,     Dialysis Method (if applicable):  N/A    CBC (last 72 hours):  Recent Labs   Lab Result Units 09/30/24  0632 10/01/24  0556   WBC K/uL 11.86 8.07   Hemoglobin g/dL 10.6* 9.2*   Hematocrit % 33.3* 28.8*   Platelets K/uL 706* 606*   Gran % % 54.0 61.6   Lymph % % 32.5 24.8   Mono % % 9.4 10.5   Eosinophil % % 1.4 1.0   Basophil % % 0.8 0.5   Differential Method  Automated Automated       Metabolic Panel (last 72 hours):  Recent Labs   Lab Result Units 09/30/24  0632 10/01/24  0556 10/02/24  1146 10/03/24  0418   Sodium mmol/L 133* 136  --  135*   Potassium mmol/L 4.4 4.4  --  4.9   Chloride mmol/L 101 101  --  100   CO2 mmol/L 25 27  --  25   Glucose mg/dL 91 108  --  98   BUN mg/dL 8 14  --  20   Creatinine mg/dL 0.7 0.7 0.7 0.7       Vancomycin  Administrations:  vancomycin given in the last 96 hours                     vancomycin 1,250 mg in D5W 250 mL IVPB (admixture device) (mg) 1,250 mg New Bag 10/02/24 2039     1,250 mg New Bag  1337     1,250 mg New Bag  0412     1,250 mg New Bag 10/01/24 2107     1,250 mg New Bag  1248     1,250 mg New Bag  0421     1,250 mg New Bag 09/30/24 2044     1,250 mg New Bag  0734     1,250 mg New Bag 09/29/24 2252     1,250 mg New Bag  1432     1,250 mg New Bag  0814                    Microbiologic Results:  Microbiology Results (last 7 days)       Procedure Component Value Units Date/Time    Fungus culture [[id number]] Collected: 09/30/24 1529    Order Status: Completed Specimen: Joint Fluid from Knee, Left Updated: 10/02/24 1457     Fungus (Mycology) Culture Culture in progress    Narrative:      Synovial fluid    Fungus culture [[id number]] Collected: 09/27/24 0759    Order Status: Completed Specimen: Wound from Arm, Left Updated: 10/02/24 1447     Fungus (Mycology) Culture Culture in progress    Narrative:      Left Forearm    Fungus culture [[id number]] Collected: 09/27/24 0759    Order Status: Completed Specimen: Wound from Arm, Left Updated: 10/02/24 1447     Fungus (Mycology) Culture Culture in progress    Narrative:      Left Ulna    Fungus culture [[id number]] Collected: 09/25/24 1746    Order Status: Completed Specimen: Joint Fluid from Knee, Left Updated: 10/02/24 1428     Fungus (Mycology) Culture Culture in progress    Fungus culture [[id number]] Collected: 09/25/24 1812    Order Status: Completed Specimen: Wound from Arm, Left Updated: 10/02/24 1428     Fungus (Mycology) Culture Culture in progress    Narrative:      Left forearm near callous    Fungus culture [[id number]] Collected: 09/25/24 1847    Order Status: Completed Specimen: Tissue from Arm, Left Updated: 10/02/24 1428     Fungus (Mycology) Culture Culture in progress    Narrative:      Left forearm soft tissue    Fungus culture [[id number]]  Collected: 09/25/24 1803    Order Status: Completed Specimen: Wound from Arm, Left Updated: 10/02/24 1428     Fungus (Mycology) Culture Culture in progress    Narrative:      Left forearm superficial    Fungus culture [[id number]] Collected: 09/25/24 1812    Order Status: Completed Specimen: Wound from Arm, Left Updated: 10/02/24 1428     Fungus (Mycology) Culture Culture in progress    Narrative:      Left ulna    Fungus culture [[id number]] Collected: 09/25/24 1847    Order Status: Completed Specimen: Tissue from Arm, Left Updated: 10/02/24 1428     Fungus (Mycology) Culture Culture in progress    Narrative:      Left forearm deep tissue    Fungus culture [[id number]] Collected: 09/25/24 1009    Order Status: Completed Specimen: Joint Fluid from Knee, Left Updated: 10/02/24 1428     Fungus (Mycology) Culture Culture in progress    Blood culture [[id number]] Collected: 09/27/24 0506    Order Status: Completed Specimen: Blood Updated: 10/02/24 1222     Blood Culture, Routine No growth after 5 days.    Blood culture [[id number]] Collected: 09/27/24 0500    Order Status: Completed Specimen: Blood Updated: 10/02/24 1222     Blood Culture, Routine No growth after 5 days.    Culture, Anaerobe [[id number]] Collected: 09/27/24 0759    Order Status: Completed Specimen: Wound from Arm, Left Updated: 10/02/24 1121     Anaerobic Culture Culture in progress    Narrative:      Left Forearm    Culture, Body Fluid (Aerobic) w/ GS [[id number]] Collected: 09/30/24 1529    Order Status: Completed Specimen: Joint Fluid from Knee, Left Updated: 10/02/24 1008     AEROBIC CULTURE - FLUID No growth     Gram Stain Result Many WBC's      No organisms seen    Narrative:      Synovial fluid    AFB Culture & Smear [[id number]] Collected: 09/30/24 1529    Order Status: Completed Specimen: Joint Fluid from Knee, Left Updated: 10/02/24 0927     AFB Culture & Smear Culture in progress     AFB CULTURE STAIN No acid fast bacilli seen.    Narrative:       Synovial fluid    Culture, Anaerobic [[id number]] Collected: 09/30/24 1529    Order Status: Completed Specimen: Joint Fluid from Knee, Left Updated: 10/02/24 0748     Anaerobic Culture Culture in progress    Narrative:      Synovial fluid    Aerobic culture [[id number]]  (Abnormal)  (Susceptibility) Collected: 09/27/24 0759    Order Status: Completed Specimen: Wound from Arm, Left Updated: 10/01/24 1005     Aerobic Bacterial Culture STAPHYLOCOCCUS AUREUS  Rare      Narrative:      Left Ulna    Aerobic culture [[id number]] Collected: 09/27/24 0759    Order Status: Completed Specimen: Wound from Arm, Left Updated: 10/01/24 0937     Aerobic Bacterial Culture No growth    Narrative:      Left Forearm    Culture, Anaerobe [[id number]] Collected: 09/27/24 0759    Order Status: Completed Specimen: Wound from Arm, Left Updated: 10/01/24 0845     Anaerobic Culture Culture in progress    Narrative:      Left Ulna    AFB Culture & Smear [[id number]] Collected: 09/27/24 0759    Order Status: Completed Specimen: Wound from Arm, Left Updated: 09/30/24 1602     AFB Culture & Smear Culture in progress     AFB CULTURE STAIN No acid fast bacilli seen.    Narrative:      Left Ulna    AFB Culture & Smear [[id number]] Collected: 09/27/24 0759    Order Status: Completed Specimen: Wound from Arm, Left Updated: 09/30/24 1602     AFB Culture & Smear Culture in progress     AFB CULTURE STAIN No acid fast bacilli seen.    Narrative:      Left Forearm    Gram stain [[id number]] Collected: 09/30/24 1529    Order Status: Canceled Specimen: Joint Fluid from Knee, Left     Aerobic culture [[id number]] Collected: 09/25/24 1812    Order Status: Completed Specimen: Wound from Arm, Left Updated: 09/30/24 0928     Aerobic Bacterial Culture No growth    Narrative:      Left ulna    Aerobic culture [[id number]] Collected: 09/25/24 1812    Order Status: Completed Specimen: Wound from Arm, Left Updated: 09/30/24 0911     Aerobic Bacterial Culture No  growth    Narrative:      Left forearm near callous    Aerobic culture [[id number]] Collected: 09/25/24 1803    Order Status: Completed Specimen: Wound from Arm, Left Updated: 09/30/24 0905     Aerobic Bacterial Culture No growth    Narrative:      Left forearm superficial    Culture, Anaerobe [[id number]] Collected: 09/25/24 1847    Order Status: Completed Specimen: Tissue from Arm, Left Updated: 09/30/24 0851     Anaerobic Culture No anaerobes isolated    Narrative:      Left forearm deep tissue    Culture, Anaerobe [[id number]] Collected: 09/25/24 1812    Order Status: Completed Specimen: Wound from Arm, Left Updated: 09/30/24 0850     Anaerobic Culture Culture in progress    Narrative:      Left forearm near callous    Culture, Anaerobe [[id number]] Collected: 09/25/24 1803    Order Status: Completed Specimen: Wound from Arm, Left Updated: 09/30/24 0849     Anaerobic Culture Culture in progress    Narrative:      Left forearm superficial    Culture, Anaerobe [[id number]] Collected: 09/25/24 1746    Order Status: Completed Specimen: Joint Fluid from Knee, Left Updated: 09/30/24 0848     Anaerobic Culture No anaerobes isolated    Culture, Anaerobe [[id number]] Collected: 09/25/24 1847    Order Status: Completed Specimen: Tissue from Arm, Left Updated: 09/30/24 0847     Anaerobic Culture No anaerobes isolated    Narrative:      Left forearm soft tissue    Culture, Anaerobe [[id number]] Collected: 09/25/24 1812    Order Status: Completed Specimen: Wound from Arm, Left Updated: 09/30/24 0846     Anaerobic Culture Culture in progress    Narrative:      Left ulna    Aerobic culture [[id number]]  (Abnormal)  (Susceptibility) Collected: 09/25/24 1746    Order Status: Completed Specimen: Wound from Knee, Left Updated: 09/30/24 0655     Aerobic Bacterial Culture STAPHYLOCOCCUS AUREUS  Rare      Blood culture x two cultures. Draw prior to antibiotics. [[id number]]  (Abnormal) Collected: 09/25/24 0901    Order Status:  Completed Specimen: Blood from Peripheral, Forearm, Right Updated: 09/29/24 0920     Blood Culture, Routine Gram stain aer bottle: Gram positive cocci in clusters resembling Staph      Gram stain altaf bottle: Gram positive cocci in clusters resembling Staph      Results called to and read back by: Marimar Valle RN 09/26/2024  06:27      STAPHYLOCOCCUS AUREUS  ID consult required at [facility].  For susceptibility see order #[id number]      Narrative:      Aerobic and anaerobic    Blood culture x two cultures. Draw prior to antibiotics. [[id number]]  (Abnormal)  (Susceptibility) Collected: 09/25/24 0903    Order Status: Completed Specimen: Blood from Peripheral, Antecubital, Left Updated: 09/29/24 0919     Blood Culture, Routine Gram stain aer bottle: Gram positive cocci in clusters resembling Staph      Gram stain altaf bottle: Gram positive cocci in clusters resembling Staph      Results called to and read back by: Marimar Valle RN 09/26/2024  06:27      STAPHYLOCOCCUS AUREUS  ID consult required at [facility].      Narrative:      Aerobic and anaerobic    Culture, Body Fluid - Bactec [[id number]]  (Abnormal)  (Susceptibility) Collected: 09/25/24 1009    Order Status: Completed Specimen: Joint Fluid from Knee, Left Updated: 09/29/24 0919     Body Fluid Culture, Sterile Gram stain: Gram positive cocci in clusters resembling Staph 09/26/2024      20:56      STAPHYLOCOCCUS AUREUS    Aerobic culture [[id number]]  (Abnormal) Collected: 09/25/24 1847    Order Status: Completed Specimen: Wound from Arm, Left Updated: 09/28/24 1324     Aerobic Bacterial Culture STAPHYLOCOCCUS AUREUS  Few  For susceptibility see order #[id number]      Narrative:      Left forearm soft tissue    Aerobic culture [[id number]]  (Abnormal)  (Susceptibility) Collected: 09/25/24 1847    Order Status: Completed Specimen: Wound from Arm, Left Updated: 09/28/24 1323     Aerobic Bacterial  Culture STAPHYLOCOCCUS AUREUS  Few      Narrative:      Left forearm deep tissue    Gram stain [[id number]] Collected: 09/27/24 0759    Order Status: Completed Specimen: Wound from Arm, Left Updated: 09/28/24 0335     Gram Stain Result No WBC's      No organisms seen    Narrative:      Left Forearm    Gram stain [[id number]] Collected: 09/27/24 0759    Order Status: Completed Specimen: Wound from Arm, Left Updated: 09/27/24 2132     Gram Stain Result No WBC's      No organisms seen    Narrative:      Left Ulna    AFB Culture & Smear [[id number]] Collected: 09/25/24 1847    Order Status: Completed Specimen: Tissue from Arm, Left Updated: 09/27/24 0927     AFB Culture & Smear Culture in progress     AFB CULTURE STAIN No acid fast bacilli seen.    Narrative:      Left forearm deep tissue    AFB Culture & Smear [[id number]] Collected: 09/25/24 1746    Order Status: Completed Specimen: Joint Fluid from Knee, Left Updated: 09/27/24 0927     AFB Culture & Smear Culture in progress     AFB CULTURE STAIN No acid fast bacilli seen.    AFB Culture & Smear [[id number]] Collected: 09/25/24 1803    Order Status: Completed Specimen: Wound from Arm, Left Updated: 09/27/24 0927     AFB Culture & Smear Culture in progress     AFB CULTURE STAIN No acid fast bacilli seen.    Narrative:      Left forearm superficial    AFB Culture & Smear [[id number]] Collected: 09/25/24 1812    Order Status: Completed Specimen: Wound from Arm, Left Updated: 09/27/24 0927     AFB Culture & Smear Culture in progress     AFB CULTURE STAIN No acid fast bacilli seen.    Narrative:      Left forearm near callous    AFB Culture & Smear [[id number]] Collected: 09/25/24 1847    Order Status: Completed Specimen: Tissue from Arm, Left Updated: 09/27/24 0927     AFB Culture & Smear Culture in progress     AFB CULTURE STAIN No acid fast bacilli seen.    Narrative:      Left forearm soft tissue    AFB Culture & Smear [[id number]] Collected: 09/25/24 1812    Order  Status: Completed Specimen: Wound from Arm, Left Updated: 09/27/24 0927     AFB Culture & Smear Culture in progress     AFB CULTURE STAIN No acid fast bacilli seen.    Narrative:      Left ulna    AFB Culture & Smear [[id number]] Collected: 09/25/24 1009    Order Status: Completed Specimen: Joint Fluid from Knee, Left Updated: 09/27/24 0927     AFB Culture & Smear Culture in progress     AFB CULTURE STAIN No acid fast bacilli seen.    Gram stain [[id number]] Collected: 09/25/24 1746    Order Status: Completed Specimen: Joint Fluid from Knee, Left Updated: 09/26/24 0930     Gram Stain Result Many WBC's      No organisms seen    Gram stain [[id number]] Collected: 09/25/24 1812    Order Status: Completed Specimen: Wound from Arm, Left Updated: 09/26/24 0919     Gram Stain Result No WBC's      No organisms seen    Narrative:      Left ulna    Gram stain [[id number]] Collected: 09/25/24 1847    Order Status: Completed Specimen: Tissue from Arm, Left Updated: 09/26/24 0914     Gram Stain Result Rare WBC's      No organisms seen    Narrative:      Left forearm soft tissue    Gram stain [[id number]] Collected: 09/25/24 1812    Order Status: Completed Specimen: Wound from Arm, Left Updated: 09/26/24 0807     Gram Stain Result No WBC's      No organisms seen    Narrative:      Left forearm near callous    Gram stain [[id number]] Collected: 09/25/24 1847    Order Status: Completed Specimen: Tissue from Arm, Left Updated: 09/26/24 0802     Gram Stain Result Rare WBC's      Rare Gram positive cocci    Narrative:      Left forearm deep tissue    Gram stain [[id number]] Collected: 09/25/24 1803    Order Status: Completed Specimen: Wound from Arm, Left Updated: 09/26/24 0800     Gram Stain Result Rare WBC's      No organisms seen    Narrative:      Left forearm superficial    MRSA/SA Rapid ID by PCR from Blood culture [[id number]]  (Abnormal) Collected: 09/25/24 0903    Order Status: Completed Updated: 09/26/24 0754     Staph  aureus ID by PCR Positive     Methicillin Resistant ID by PCR Positive    Narrative:      Aerobic and anaerobic

## 2024-10-03 NOTE — PROCEDURES
"Georges Daniels is a 38 y.o. male patient.    Temp: 98.6 °F (37 °C) (10/03/24 1154)  Pulse: 97 (10/03/24 1154)  Resp: 18 (10/03/24 1550)  BP: 128/74 (10/03/24 1154)  SpO2: 98 % (10/03/24 1154)  Weight: 67.6 kg (149 lb 0.5 oz) (10/02/24 0351)  Height: 6' 1" (185.4 cm) (09/26/24 1159)    PICC  Date/Time: 10/3/2024 3:28 PM  Performed by: Austin Lopez RN  Consent Done: Yes  Time out: Immediately prior to procedure a time out was called to verify the correct patient, procedure, equipment, support staff and site/side marked as required  Indications: med administration and vascular access  Anesthesia: local infiltration  Local anesthetic: lidocaine 1% without epinephrine  Anesthetic Total (mL): 2  Preparation: skin prepped with ChloraPrep  Skin prep agent dried: skin prep agent completely dried prior to procedure  Sterile barriers: all five maximum sterile barriers used - cap, mask, sterile gown, sterile gloves, and large sterile sheet  Hand hygiene: hand hygiene performed prior to central venous catheter insertion  Location details: right basilic  Catheter type: double lumen  Catheter size: 4 Fr  Catheter Length: 36cm    Ultrasound guidance: yes  Vessel Caliber: large and patent, compressibility normal  Vascular Doppler: not done  Needle advanced into vessel with real time Ultrasound guidance.  Guidewire confirmed in vessel.  Sterile sheath used.  no esophageal manometryNumber of attempts: 1  Post-procedure: blood return through all ports, chlorhexidine patch and sterile dressing applied  Estimated blood loss (mL): 0    Complications: none          Name Austin Lopez RN  10/3/2024    "

## 2024-10-03 NOTE — PROGRESS NOTES
Orthopedic Discharge Instructions    Weight bearing status:    FWB/WBAT: left lower extremity  WBAT via platform left upper extremity    Ice to left knee/forearm as needed.    Wound care instructions:  LEFT forearm:  keep splint and dressing clean and dry and in place until seen by ortho  LEFT knee: cover each incision with a bandid.  May remove bandaids to shower and cleanse incision.  Then recover with bandaids    .  [facility]up with [facility] in 1 week has been made.             [facility] [address] [place] [address]      Phone [phone]      Fax [phone]

## 2024-10-03 NOTE — PROGRESS NOTES
3 Days Post-Op       SUBJECTIVE:   Patient reports:  Pain in knee continuing to improve.  Was able to put more weight on it today.    Forearm still painfree.    PT/Nursing notes reviewed.       Physical Exam:   Vital Signs   Wt Readings from Last 1 Encounters:   10/02/24 67.6 kg (149 lb 0.5 oz)     Temp Readings from Last 1 Encounters:   10/03/24 98.6 °F (37 °C) (Oral)     BP Readings from Last 1 Encounters:   10/03/24 128/74     Pulse Readings from Last 1 Encounters:   10/03/24 97       Body mass index is 19.66 kg/m².    General Appearance:   NAD, well appearing, cooperative    Neurologic:  Alert and oriented x3    Pysch:  Age appropriate    STATION:   Seat in bed     Musculoskeletal:      Left arm:  Dressing/splint in place with no drainage.  No erythema. Swelling even more improved.   No ecchymosis. Distal neurovascular status intact.     Left Knee   Dressing clean/dry/intact.  Removed.  Incisions benign.  Small effusion.  Mildly  tender.  AROM 0-100 slowly.  Calf soft NTTP.  Distal neurovascular status intact.                                     LABS:   Hemoglobin   Date/Time Value Ref Range Status   10/01/2024 05:56 AM 9.2 (L) 14.0 - 18.0 g/dL Final     Hematocrit   Date/Time Value Ref Range Status   10/01/2024 05:56 AM 28.8 (L) 40.0 - 54.0 % Final       Sodium   Date/Time Value Ref Range Status   10/03/2024 04:18  (L) 136 - 145 mmol/L Final     Potassium   Date/Time Value Ref Range Status   10/03/2024 04:18 AM 4.9 3.5 - 5.1 mmol/L Final     Glucose   Date/Time Value Ref Range Status   10/03/2024 04:18 AM 98 70 - 110 mg/dL Final              Assessment:         LEFT knee septic arthritis, s/p arthroscopic I/D  Left ulna infected non-union, s/p Irrigation/debridement including of fracture nonunion  Sepsis  Hepatitis C  Homelessness  H/o IVDA    DISCUSSION:   Patient's symptoms, imaging, diagnosis and prognosis reviewed and discussed.  Discussed healing progression. Case with attending  hospitalist.    Patient given an opportunity to ask questions.  When his questions, were answered, the following plan was adopted.      Plan:           Orthopedic Discharge Instructions     Weight bearing status:    FWB/WBAT: left lower extremity  WBAT via platform left upper extremity     Ice to left knee/forearm as needed.     Wound care instructions:  LEFT forearm:  keep splint and dressing clean and dry and in place until seen by ortho  LEFT knee: cover each incision with a bandid.  May remove bandaids to shower and cleanse incision.  Then recover with bandaids     .  [facility]up with [facility] in 1 week has been made.             [facility] [address] [place] [address]      Phone [phone]      Fax [phone]               Morena Lamar DO, CABILLIEM, [facility]  Orthopaedic Surgeon

## 2024-10-03 NOTE — PT/OT/SLP PROGRESS
"Physical Therapy Treatment    Patient Name:  Georges Daniels   MRN:  [id number]    Recommendations:     Discharge Recommendations: Low Intensity Therapy  Discharge Equipment Recommendations:  (platform RW)  Barriers to discharge:  pt is homeless    Assessment:     Georges Daniels is a 38 y.o. male admitted with a medical diagnosis of Septic arthritis of knee, left.  He presents with the following impairments/functional limitations: weakness, impaired endurance, impaired functional mobility, gait instability, impaired balance, pain, decreased safety awareness, decreased lower extremity function, decreased ROM, orthopedic precautions.    Rehab Prognosis: Good; patient would benefit from acute skilled PT services to address these deficits and reach maximum level of function.    Recent Surgery: Procedure(s) (LRB):  IRRIGATION AND DEBRIDEMENT, UPPER EXTREMITY (Left)  ARTHROSCOPY, KNEE, WITH LAVAGE AND DRAINAGE, FOR INFECTION (Left) 3 Days Post-Op    Plan:     During this hospitalization, patient to be seen 3 x/week to address the identified rehab impairments via gait training, therapeutic activities, neuromuscular re-education and progress toward the following goals:    Plan of Care Expires:  10/10/24    Subjective     Chief Complaint: Pt is motivated to participate, reported "Can I try without the walker?"  Patient/Family Comments/goals: none stated  Pain/Comfort:  Pain Rating 1: 5/10  Location - Side 1: Left  Location - Orientation 1: generalized  Location 1: leg  Pain Addressed 1: Reposition, Distraction  Pain Rating Post-Intervention 1: 5/10  Pain Rating 2: 5/10  Location - Side 2: Left  Location - Orientation 2: generalized  Location 2: arm  Pain Addressed 2: Reposition, Distraction  Pain Rating Post-Intervention 2: 5/10      Objective:     Communicated with nurse and epic chart review prior to session.  Patient found sitting edge of bed with peripheral IV, telemetry upon PT entry to room.     General " "Precautions: Standard, fall, contact  Orthopedic Precautions: LUE weight bearing as tolerated, LLE weight bearing as tolerated (UE WB with platform RW)  Braces: N/A  Respiratory Status: Room air     Functional Mobility:  Gait belt applied - Yes  Bed Mobility  Seated EOB at start of session and returned to chair  Transfers  Sit to Stand: supervision with no AD  Bed to Chair: supervision with no AD using Step Transfer  Gait  Patient ambulated 60ft with no AD and supervision. Patient demonstrates steady gait. No c/o dizziness or SOB, no LOB. All lines remained intact throughout ambulation trail.  Balance  Sitting: supervision  Standing: supervision    Therapeutic Exercise  Patient performed 1 set(s) of 15 repetitions of the following seated exercises: ankle pumps, long arc quads, and marches for bilateral LE. L LE to tolerance.   Patient performed 1 set(s) of 15 repetitions of the following seated exercises: shoulder flexion, bicep curls, hand squeeze for bilateral UE. L UE to tolerance.   Patient required skilled PT for instruction of exercises and appropriate cues to perform exercises safely and appropriately.      AM-PAC 6 CLICK MOBILITY  Turning over in bed (including adjusting bedclothes, sheets and blankets)?: 3  Sitting down on and standing up from a chair with arms (e.g., wheelchair, bedside commode, etc.): 3  Moving from lying on back to sitting on the side of the bed?: 3  Moving to and from a bed to a chair (including a wheelchair)?: 3  Need to walk in hospital room?: 3  Climbing 3-5 steps with a railing?: 1 (NT)  Basic Mobility Total Score: 16       Treatment & Education:  Reviewed role of PT in acute care and POC. Pt tolerated interventions well. Reviewed L LE and UE WBAT. Reviewed importance of OOB activities, activity pacing, and HEP (marching/hip flex, hip abd, heel slides/LAQ, quad sets, ankle pumps) in order to maintain/regain strength. Encouraged to sit up in chair for all meals. Reviewed "call don't " "fall" policy and increased risk of falling due to weakness, instructed to utilize call bell for assistance with all transfers. Pt agreeable to all requests.    Patient left up in chair with all lines intact, call button in reach, and visitor present..    GOALS:   Multidisciplinary Problems       Physical Therapy Goals          Problem: Physical Therapy    Goal Priority Disciplines Outcome Interventions   Physical Therapy Goal     PT, PT/OT Progressing    Description: Goals to be met by 10/10/24.  1. Pt will complete bed mobility MOD I.  2. Pt will complete sit to stand MOD I.  3. Pt will ambulate 200ft MOD I with platform RW.  4. Pt will increase AMPAC score by 2 points to progress functional mobility.                       Time Tracking:     PT Received On: 10/03/24  PT Start Time: 1000     PT Stop Time: 1025  PT Total Time (min): 25 min     Billable Minutes: Gait Training 10min and Therapeutic Exercise 15min    Treatment Type: Treatment  PT/PTA: PT     Number of PTA visits since last PT visit: 0     10/03/2024  "

## 2024-10-03 NOTE — PLAN OF CARE
[facility] - Telemetry (Hospital)  Discharge Final Note    Primary Care Provider: No, Primary Doctor    Expected Discharge Date: 10/3/2024    Final Discharge Note (most recent)       Final Note - 10/03/24 1407          Final Note    Assessment Type Final Discharge Note     Anticipated Discharge Disposition [address] [facility] Resources/Appts/Education Provided Post-Acute resouces added to AVS        Post-Acute Status    Post-Acute Authorization Placement     Post-Acute Placement Status Set-up Complete/Auth obtained     Discharge Delays None known at this time                   Pt to discharge to [facility] for [facility] today. Pt agreeable to plan.  Nurse provided with number for report: [phone], facility will arrange transport.    Important Message from Medicare             Contact Info       [facility]    [address] [place] [address]   Phone: [phone]       Next Steps: Follow up    Instructions: LTAC

## 2024-10-03 NOTE — DISCHARGE SUMMARY
[facility] Medicine  Discharge Summary      Patient Name: Georges Daniels  MRN: [id number]  [facility]: [id number]  Patient Class: IP- Inpatient  Admission Date: 9/25/2024  Hospital Length of Stay: 8 days  Discharge Date and Time:  10/03/2024 1:58 PM  Attending Physician: Yousuf Gómez MD   Discharging Provider: Yousuf Gómez MD  Primary Care Provider: Sally, Primary Doctor    Primary Care Team: Networked reference to record PCT     HPI:   Pt is a 39 YO  male with PMH notable for IV drug abuse, homelessness who presents to the ED for evaluation of L arm and knee pain. Pt reports that he has been having pain all over, but worse over the L arm and L knee over last day. Denies any recent trauma, injury, falls. Denies injections into joints. Reports feeling feverish over the last 4-5 days. In the ED, pt noted to have swelling to L forearm and L knee. Initial VS: Tmax 102.7, , /84, sats 100% on room air. Initial work up: WBC 15, Na 129, , procal 0.84, UDS + amphetamines. Imaging notable for: large L knee effusion, MRI of LUE showed ulnar shaft chronic fracture with callus formation, fracture incompletely healed, also has 2 loculated fluid collections at margins of callus concerning for osteomyelitis, abscesses and cellulitis. Pt underwent aspiration of L knee in the ED, aspirate yellow/cloudy with 44725 WBC with 89% segs, concerning for septic arthritis. Pt received Vanc + Cefepime in the ED. Orthopedics consulted and planning for washout of L knee and forearm today. Hospital medicine consulted for admission     Procedure(s) (LRB):  IRRIGATION AND DEBRIDEMENT, UPPER EXTREMITY (Left)  ARTHROSCOPY, KNEE, WITH LAVAGE AND DRAINAGE, FOR INFECTION (Left)      Hospital Course:   The patient underwent a washout of the left knee and left forearm on September 25, 2024, performed by Dr. Lamar with ortho. Blood cultures drawn at admission were positive for Staphylococcus  "aureus in both sets, indicating a likely source of infection from a skin and soft tissue infection. He remained on IV Vancomycin and Cefepime; however, upon consultation with Infectious Disease (ID), the decision was made to discontinue Cefepime after blood cultures revealed MRSA.    A repeat incision and drainage of the left upper extremity was conducted on September 27 by Dr. Lamar, with plans for an additional I&D on September 30. ID recommended a four-week course of IV Vancomycin, with an expected end of treatment date of October 29, 2024. Due to homelessness and intravenous drug use, the patient was deemed unsuitable for home IV infusion therapy and a consult with Social Work was initiated to begin the process of LTAC placement for the administration of long-term IV antibiotics.    On September 30, the orthopedic team performed another surgical intervention, primarily focusing on the left arm. It was determined that no further intervention was necessary for the arm; however, the left knee was subjected to arthroscopy with lavage and drainage, and fluid was collected for cultures. The patient was clinically accepted by an LTAC facility, and it was recommended to hold the transfer for approximately 48 hours to ensure he did not require any additional surgical interventions.    As of October 3, 2024, the patient was reported to be doing well. PICC line was ordered early in the morning, and insurance approval for LTAC placement was secured. Discharge orders were placed pending the insertion of the PICC line.    /74 (Patient Position: Lying)   Pulse 97   Temp 98.6 °F (37 °C) (Oral)   Resp 16   Ht 6' 1" (1.854 m)   Wt 67.6 kg (149 lb 0.5 oz)   SpO2 98%   BMI 19.66 kg/m²        Goals of Care Treatment Preferences:  Code Status: Full Code         Consults:   Consults (From admission, onward)          Status Ordering Provider     Inpatient consult to Social Work  Once        Provider:  (Not yet assigned) " "   Completed SARAYA, BRANDIE S     Inpatient consult to Infectious Diseases  Once        Provider:  Panda Holman MD, DONTAE    Acknowledged AISHA BRANDIE S     Inpatient consult to Orthopedics  Once        Provider:  Morena Lamar DO    Completed BRANDIE LEONARD     Inpatient consult to Orthopedic Surgery  Once        Provider:  Morena Lamar DO    Completed BRANDIE LEONARD     Pharmacy to dose Vancomycin consult  Once        Provider:  (Not yet assigned)   Placed in "And" Linked Group    Acknowledged MORENA LAMAR            No new Assessment & Plan notes have been filed under this hospital service since the last note was generated.  Service: Hospital Medicine    Final Active Diagnoses:    Diagnosis Date Noted POA    PRINCIPAL PROBLEM:  Septic arthritis of knee, left [M00.9] 09/25/2024 Yes    Staphylococcus aureus bacteremia [R78.81, B95.61] 09/26/2024 Yes    Abscess of left arm [L02.414] 09/25/2024 Yes    IV drug abuse [F19.10] 09/25/2024 Yes     Chronic    Sepsis [A41.9] 09/25/2024 Yes    Hepatitis C antibody test positive [R76.8] 09/25/2024 Yes      Problems Resolved During this Admission:    Diagnosis Date Noted Date Resolved POA    Hyponatremia [E87.1] 09/25/2024 09/29/2024 Yes       Discharged Condition: good    Disposition: Rehab Facility    Follow Up:   Follow-up Information       [facility] Follow up.    Why: LTAC  Contact information:  [address]  [facility] [id number] [phone]                         Patient Instructions:      Reason for not Ordering Smoking Cessation Referral     Order Specific Question Answer Comments   Reason for not ordering: Not medically appropriate at this time      Reason for not Prescribing Nicotine Replacement     Order Specific Question Answer Comments   Reason for not Prescribing: Not medically appropriate at this time        Significant Diagnostic Studies: Labs: All labs within the past 24 hours have been " reviewed    Pending Diagnostic Studies:       Procedure Component Value Units Date/Time    FL PICC Line Placement w/o Port w/ Img < 2 Y/O [[id number]]     Order Status: Sent Lab Status: No result            Medications:  Reconciled Home Medications:      Medication List        START taking these medications      acetaminophen 325 MG tablet  Commonly known as: TYLENOL  Take 2 tablets (650 mg total) by mouth every 4 (four) hours as needed.     D5W SolP 250 mL with vancomycin 1,000 mg SolR 1,000 mg  Inject 1,000 mg into the vein every 8 (eight) hours.     oxyCODONE 5 MG immediate release tablet  Commonly known as: ROXICODONE  Take 1 tablet (5 mg total) by mouth every 6 (six) hours as needed for Pain.              Indwelling Lines/Drains at time of discharge:   Lines/Drains/Airways       None                   Time spent on the discharge of patient: 31 minutes  of time spent on discharge including examining patient, providing discharge instructions, arranging follow-up and documentation.           Yousuf Gómez MD  Department of Hospital Medicine  [facility] - Telemetry ([facility])

## 2024-10-03 NOTE — PLAN OF CARE
Pt tolerated interventions well. Required SPV for STS, ambulated 60ft SPV, no AD. Recommending low intensity therapy upon d/c.

## 2024-10-03 NOTE — PLAN OF CARE
Pt pending PICC line placement for DC. DC clinicals sent to [facility] via [employer].   SW awaiting number for report and transportation details to provide to bedside nurse.

## 2024-10-03 NOTE — PROGRESS NOTES
[facility] Medicine  Progress Note    Patient Name: Georges Daniels  MRN: [id number]  Patient Class: IP- Inpatient   Admission Date: 9/25/2024  Length of Stay: 8 days  Attending Physician: Yousuf Gómez MD  Primary Care Provider: Sally, Primary Doctor        Subjective:     Principal Problem:Septic arthritis of knee, left        HPI:  Pt is a 37 YO  male with PMH notable for IV drug abuse, homelessness who presents to the ED for evaluation of L arm and knee pain. Pt reports that he has been having pain all over, but worse over the L arm and L knee over last day. Denies any recent trauma, injury, falls. Denies injections into joints. Reports feeling feverish over the last 4-5 days. In the ED, pt noted to have swelling to L forearm and L knee. Initial VS: Tmax 102.7, , /84, sats 100% on room air. Initial work up: WBC 15, Na 129, , procal 0.84, UDS + amphetamines. Imaging notable for: large L knee effusion, MRI of LUE showed ulnar shaft chronic fracture with callus formation, fracture incompletely healed, also has 2 loculated fluid collections at margins of callus concerning for osteomyelitis, abscesses and cellulitis. Pt underwent aspiration of L knee in the ED, aspirate yellow/cloudy with 11085 WBC with 89% segs, concerning for septic arthritis. Pt received Vanc + Cefepime in the ED. Orthopedics consulted and planning for washout of L knee and forearm today. Hospital medicine consulted for admission     Overview/Hospital Course:  Underwent washout of L knee and L forearm with ortho Dr. Lamar on 09/25/2024. Blood cultures from admission with staph in 2/2 sets, likely SST source of infection. Remains on IV Vanc + Cefepime. Infectious disease consulted for antibiotics recommendations. As blood cultures showed MRSA, Cefepime discontinued per ID. Underwent repeat I&D of LUE on 09/27 with Dr. Lamar and planned for another I&D on 09/30 per Dr. Lamar    ID  "recommend 4 week course of IV Vancomycin with EOT 10/29/2024  Will need LTAC placement for long term IV abx once ortho plans finalized. Not a candidate for home IV infusion as he is homeless and IVDU. Will consult SW to start LTAC process.     Ortho took him back to the OR on 9/30, planned for I&D of arm, reported no need for further intervention with arm. Proceeded with left Knee arthroscopy with lavage and drainage, collected fluid for cultures. Patient has been clinically accepted by an LTAC, discussed with orthopedic, recommended to hold for approx. 48H to ensure he does not require additional surgical intervention prior to transfer.     10/03/2024  Patient doing well today.  Insurance approve LTAC.  Orthopedic surgery clearance pending.    Interval History:  See hospital course    Objective:   /74 (Patient Position: Lying)   Pulse 97   Temp 98.6 °F (37 °C) (Oral)   Resp 16   Ht 6' 1" (1.854 m)   Wt 67.6 kg (149 lb 0.5 oz)   SpO2 98%   BMI 19.66 kg/m²     Intake/Output Summary (Last 24 hours) at 10/3/2024 1216  Last data filed at 10/3/2024 0400  Gross per 24 hour   Intake 480 ml   Output 1250 ml   Net -770 ml       PHYSICAL EXAM  Vitals reviewed  Constitutional:       General: He is not in acute distress.     Appearance: Ill appearance: chronic.   Cardiovascular:      Rate and Rhythm: Normal rate and regular rhythm.   Pulmonary:      Effort: Pulmonary effort is normal.      Breath sounds: Normal breath sounds. No wheezing or rales.   Abdominal:      General: Bowel sounds are normal. There is no distension.      Palpations: Abdomen is soft.      Tenderness: There is no abdominal tenderness.   Musculoskeletal:      Right lower leg: No edema.   Skin:     Comments: LUE AND LLE DRESSINGS IN PLACE    Neurological:      Mental Status: He is alert and oriented to person, place, and time.     LABS  All labs from the past 24 hours were reviewed.     BMP:   Recent Labs   Lab 10/03/24  0418   GLU 98   * " "  K 4.9      CO2 25   BUN 20   CREATININE 0.7   CALCIUM 9.0     CBC:   No results for input(s): "WBC", "HGB", "HCT", "PLT" in the last 48 hours.    CMP:   Recent Labs   Lab 10/02/24  1146 10/03/24  0418   NA  --  135*   K  --  4.9   CL  --  100   CO2  --  25   GLU  --  98   BUN  --  20   CREATININE 0.7 0.7   CALCIUM  --  9.0   ANIONGAP  --  10         IMAGING  All imaging from the past 24 hours were reviewed.     Imaging Results              MRI Lumbar Spine W WO Cont (Final result)  Result time 09/25/24 13:24:24      Final result by Darrian Zhang MD (09/25/24 13:24:24)                   Impression:      No acute abnormality or abnormal enhancement.    Multilevel degenerative changes as detailed above including faint Modic type 1 edema at the L5-S1 endplates.    No significant spinal canal stenosis.  Varying degrees of mild-to-moderate neural foraminal stenosis at the L4-L5 and L5-S1 levels.      Electronically signed by: Darrian Zhang  Date:    09/25/2024  Time:    13:24               Narrative:    EXAMINATION:  MRI LUMBAR SPINE W WO CONTRAST    CLINICAL HISTORY:  Low back pain, infection suspected;infec;    TECHNIQUE:  Multiplanar, multisequence MR images were acquired from the thoracolumbar junction to the sacrum before and after intravenous administration of 6 mL Gadavist    COMPARISON:  None.    FINDINGS:  There are 5 non-rib-bearing lumbar vertebrae.  Alignment is unremarkable with no significant listhesis.  No acute fracture or compression deformity.  No abnormal osseous enhancement or aggressive focal signal abnormality.  Faint Modic type 1 edema noted at the L5-S1 endplates.    Conus medullaris terminates at the L2 level.  Conus medullaris is normal in size and signal.    T12-L1: No significant disc pathology.  No significant spinal canal or neural foraminal stenosis.    L1-L2: No significant disc pathology.  No significant spinal canal or neural foraminal stenosis.    L2-L3: No " significant disc pathology.  No significant spinal canal or neural foraminal stenosis.    L3-L4: Mild disc desiccation and trace disc bulge.  Mild bilateral facet arthropathy.  No significant spinal canal or neural foraminal stenosis.    L4-L5: Mild disc desiccation and small circumferential disc bulge.  Mild bilateral facet arthropathy.  No significant spinal canal stenosis.  Mild left greater than right neural foraminal stenosis.    L5-S1: Disc desiccation and small circumferential disc bulge.  No significant spinal canal stenosis.  Moderate right and mild left neural foraminal stenosis.    Paraspinal soft tissues are unremarkable.  Visualized intra-abdominal and pelvic contents are also unremarkable.                                       MRI Forearm W WO Contrast Left (Final result)  Result time 09/25/24 13:43:57   Procedure changed from MRI Forearm With Contrast Left     Final result by Masood Ching MD (09/25/24 13:43:57)                   Impression:     Markedly abnormal MR left forearm.  See discussion above.    Finalized on: 9/25/2024 1:43 PM By:  Masood Ching MD  [facility]# [id number]      2024-09-25 13:46:00.030    [facility]               Narrative:    EXAM:  MRI FOREARM W WO CONTRAST LEFT    CLINICAL HISTORY:   Osteomyelitis, unspecified    TECHNIQUE:  MR left forearm with and without contrast performed multiplanar multisequence imaging.  6 mL Gadavist.    COMPARISON STUDY:   Left forearm x-ray 09/24/2025, 06/30/2024.    FINDINGS:  There is marked motion artifact on multiple sequences limiting detail.    Again, there is a ulnar shaft chronic fracture sequela with pronounced mature callus formation, new compared with June 2024, incompletely healed with persistent fracture defect, filled in with enhancing granulation/fluid signal and marked circumferential soft tissue swelling encircling the fracture site and extending throughout the mid to distal forearm.    Additionally, there are (2) loculated nonenhancing complex  fluid collections medial and lateral margins of the exuberant callus, 3.5 x 1.5 x 1 cm and 2.1 x 0.8 x 0.7 cm, with the larger collection demonstrating a sinus track extending directly to the exuberant callus formation.    There is enhancing ulnar shaft bone marrow edema with decreased T1 and increased STIR signal.    There is diffuse forearm and hand the prominent circumferential subcutaneous edema.    The findings are characteristic of osteomyelitis, soft tissue abscesses, and marked cellulitis all superimposed on ulnar shaft fracture with delayed healing.  There is concomitant infective myositis with muscle belly edema surrounding the marked periosteal inflammation.    Normal radius.                                           US Lower Extremity Veins Left (Final result)  Result time 09/25/24 09:38:54      Final result by Samanta Elder MD (09/25/24 09:38:54)                   Impression:      No evidence of deep venous thrombosis in the left lower extremity.    4.0 cm fluid collection along the medial aspect of the knee, which may represent knee effusion.      Electronically signed by: Samanta Elder  Date:    09/25/2024  Time:    09:38               Narrative:    EXAMINATION:  US LOWER EXTREMITY VEINS LEFT    CLINICAL HISTORY:  Other specified soft tissue disorders    TECHNIQUE:  Duplex and color flow Doppler evaluation and graded compression of the left lower extremity veins was performed.    COMPARISON:  None    FINDINGS:  Left thigh veins: The common femoral, femoral, popliteal, upper greater saphenous, and deep femoral veins are patent and free of thrombus. The veins are normally compressible and have normal phasic flow and augmentation response.    Left calf veins: The visualized calf veins are patent.    Contralateral CFV: The contralateral (right) common femoral vein is patent and free of thrombus.    Miscellaneous: Fluid collection measuring 3.7 x 3.0 x 4.0 cm noted along the medial aspect of  the knee, potentially representing effusion.                                       X-Ray Chest AP Portable (Final result)  Result time 09/25/24 08:51:35      Final result by Antwan Bolton MD (09/25/24 08:51:35)                   Impression:      1.  Reticular interstitial changes throughout the lungs.  Interstitial pulmonary edema versus interstitial infectious process must be considered.    2.  Negative for acute process otherwise.  Stable findings as noted above.      Electronically signed by: Antwan Bolton MD  Date:    09/25/2024  Time:    08:51               Narrative:    EXAMINATION:  XR CHEST AP PORTABLE    CLINICAL HISTORY:  Sepsis;    COMPARISON:  October 5, 2009    FINDINGS:  EKG leads overlie the chest.  Low lung volumes.  Reticular interstitial changes throughout the lungs.  The lungs are free of alveolar opacities.  The cardiac silhouette size is normal. The trachea is midline and the mediastinal width is normal. Negative for focal infiltrate, effusion or pneumothorax. Pulmonary vasculature is normal. Negative for osseous abnormalities. Tortuous aorta.  Marginal spondylosis.                                       X-Ray Forearm Left (Final result)  Result time 09/25/24 08:50:43      Final result by Antwan Bolton MD (09/25/24 08:50:43)                   Impression:      1.  Exuberant callus formation involves the distal ulnar shaft, with lucency in the ulnar shaft.  Considering the continued presence of a needle fragment in the antecubital fossa, an underlying infectious process in the ulnar shaft must be considered.  Clinical correlation is advised.  The patient may benefit from an MRI to further evaluate.    2.  Stable findings as noted above.      Electronically signed by: Antwan Bolton MD  Date:    09/25/2024  Time:    08:50               Narrative:    EXAMINATION:  XR FOREARM LEFT    CLINICAL HISTORY:  Cellulitis of left upper limb    TECHNIQUE:  AP and lateral views of the left forearm were  performed.    COMPARISON:  June 30, 2024    FINDINGS:  Exuberant callus formation surrounds is a present involves the distal ulnar shaft, with lucency in the ulnar shaft.  An infectious process must be considered.    The rest of the visualized osseous structures and soft tissues are intact.    There is a needle fragment within the antecubital fossa.                                       X-Ray Knee Complete 4 or More Views Left (Final result)  Result time 09/25/24 08:46:01      Final result by Antwan Bolton MD (09/25/24 08:46:01)                   Impression:      1.  Knee joint effusion without underlying fracture seen.  Joint effusions can be associated with trauma, infection or synovitis.      Electronically signed by: Antwan Bolton MD  Date:    09/25/2024  Time:    08:46               Narrative:    EXAMINATION:  XR KNEE COMP 4 OR MORE VIEWS LEFT    CLINICAL HISTORY:  Effusion, left knee    TECHNIQUE:  AP, lateral, and bilateral oblique views of the left knee were performed.    COMPARISON:  None    FINDINGS:  There is a large knee joint effusion.  No definite underlying fracture is seen.  Joint spaces are well maintained.  Fabella.                                        Assessment/Plan:      * Septic arthritis of knee, left  - s/p aspiration in the ED with 03560 WBC, 89% polys  - orthopedics consulted- s/p washout of L knee 09/25 with Dr. Lamar   - Continue empiric IV Vanc ; vanc dosing and monitoring per pharmacy  - aspirate  cultures  NGTD   - ID consulted and recommend IV Vanc x 4 weeks (EOT 10/29)   - pain control with PO Roxicodone and IV mOrphine as needed  - continue PT/OT     10/03/2024  LTAC approved  Orders for PICC line placed this morning  Continue IV Vanc per ID recs    Staphylococcus aureus bacteremia  - likely skin/soft tissue source  - repeat blood cultures 09/27 NGTD   - TTE 09/26 with no vegetations noted   - ID consult for abx recommendations   - Continue IV Vancomycin      10/03/2024  Repeat cultures negative  Orders for PICC line placed this morning  Continue IV Vanc per ID recs    Hepatitis C antibody test positive  - confirmatory test pending, likely in setting of IVDA       Sepsis  This patient does have evidence of infective focus  Fluid challenge s/p sepsis fluids in the Ed    Post- resuscitation assessment No - Post resuscitation assessment not needed   Will Not start Pressors   Source control achieved by: IV antibiotics, surgical washout     10/03/2024  resolved    IV drug abuse  Prior hx of IVDA, UDS on admission + for methamphetamines   Complicating management of above conditions   Will need LTAC placement for IV abx on discharge; sw following   --patient clinically accepted by [facility], possible d/c on Thursday pending Ortho clearance    10/03/2024  -LTAC approved  -Orders for PICC line placed this morning  -will need to be removed prior to discharge from facility  -Continue IV Vanc per ID recs    Abscess of left arm  - MRI concerning for abscess, osteomyelitis and cellulitis of L arm   - orthopedics consulted; s/p washout 09/25/2024, 09/27/2024 with Dr. Lamar   - discussed with Dr. Lamar- pt will undergo repeat washout on 09/30/2024  - prelim op culture with MRSA   - continue IV Antibiotics as above    - ID consult for abx recommendations - ID rec 4 week course of IV Vanc (EOT 10/29/2024)     10/03/2024  LTAC approved  Orders for PICC line placed this morning  Continue IV Vanc per ID recs      VTE Risk Mitigation (From admission, onward)           Ordered     Reason for No Pharmacological VTE Prophylaxis  Once        Question:  Reasons:  Answer:  Physician Provided (leave comment)  Comment:  surgical intervention planned    09/25/24 1434     IP VTE HIGH RISK PATIENT  Once         09/25/24 1434     Place sequential compression device  Until discontinued         09/25/24 1434                    Discharge Planning   HAYLEE:      Code Status: Full Code   Is the patient  medically ready for discharge?:     Reason for patient still in hospital (select all that apply): Patient trending condition  Discharge Plan A: Long-term acute care facility (LTAC)   Discharge Delays: None known at this time              Yousuf Gómez MD  Department of Hospital Medicine   [facility] - Telemetry ([facility])

## 2024-10-03 NOTE — PLAN OF CARE
10/03/24 1136   Post-Acute Status   Post-Acute Authorization Placement   Post-Acute Placement Status Set-up Complete/Auth obtained   Discharge Plan   Discharge Plan A Long-term acute care facility (LTAC)   Discharge Plan B Long-term acute care facility (LTAC)     Authorization received for [facility] for LTAC. MD and nursing notified.  SW updated pt and s/o at bedside.  SW to f/u to facilitate DC to LTAC once finalized.

## 2024-10-04 LAB — BACTERIA SPEC ANAEROBE CULT: NORMAL

## 2024-10-05 LAB
BACTERIA FLD AEROBE CULT: NO GROWTH
GRAM STN SPEC: NORMAL
GRAM STN SPEC: NORMAL

## 2024-10-07 LAB — BACTERIA SPEC ANAEROBE CULT: NORMAL

## 2024-10-08 LAB — BACTERIA SPEC ANAEROBE CULT: NORMAL

## 2024-10-10 NOTE — ANESTHESIA POSTPROCEDURE EVALUATION
Anesthesia Post Evaluation    Patient: Georges Daniels    Procedure(s) Performed: Procedure(s) (LRB):  IRRIGATION AND DEBRIDEMENT, UPPER EXTREMITY (Left)  ARTHROSCOPY, KNEE, WITH LAVAGE AND DRAINAGE, FOR INFECTION (Left)    Final Anesthesia Type: general      Patient location during evaluation: PACU  Patient participation: Yes- Able to Participate  Level of consciousness: awake and alert, oriented and awake  Post-procedure vital signs: reviewed and stable  Pain management: adequate  Airway patency: patent    PONV status at discharge: No PONV  Anesthetic complications: no      Cardiovascular status: blood pressure returned to baseline  Respiratory status: unassisted  Hydration status: euvolemic  Follow-up not needed.              Vitals Value Taken Time   /74 10/03/24 1154   Temp 37 °C (98.6 °F) 10/03/24 1154   Pulse 97 10/03/24 1154   Resp 18 10/03/24 1550   SpO2 98 % 10/03/24 1154         Event Time   Out of Recovery 09/30/2024 17:13:19         Pain/Adis Score: No data recorded

## 2024-10-11 NOTE — PHYSICIAN QUERY
Please clarify the infectious disease diagnosis.    Sepsis due to suspected organism (please specify): staphylococcus

## 2024-11-08 ENCOUNTER — TELEPHONE (OUTPATIENT)
Dept: ORTHOPEDICS | Facility: CLINIC | Age: 39
End: 2024-11-08
Payer: MEDICAID

## 2024-11-08 NOTE — TELEPHONE ENCOUNTER
----- Message from Jennifer sent at 11/8/2024 10:43 AM CST -----  Contact: Jagruti/[facility] is needing a call back in regards to scheduling a f/u appt for the patients R knee. Please give her a call back at [phone]

## 2024-11-08 NOTE — TELEPHONE ENCOUNTER
Spoke with Jagruti/ [facility] and scheduled patient's post op appointment. Understanding verbalized of appointment date, time and location.

## 2025-03-25 ENCOUNTER — HOSPITAL ENCOUNTER (EMERGENCY)
Facility: HOSPITAL | Age: 40
Discharge: LEFT BEFORE DISCHARGE | End: 2025-03-25
Attending: EMERGENCY MEDICINE
Payer: MEDICAID

## 2025-03-25 VITALS
BODY MASS INDEX: 19.66 KG/M2 | HEART RATE: 96 BPM | OXYGEN SATURATION: 98 % | DIASTOLIC BLOOD PRESSURE: 78 MMHG | SYSTOLIC BLOOD PRESSURE: 136 MMHG | HEIGHT: 73 IN | RESPIRATION RATE: 16 BRPM | TEMPERATURE: 98 F

## 2025-03-25 DIAGNOSIS — S41.112A ARM LACERATION, LEFT, INITIAL ENCOUNTER: ICD-10-CM

## 2025-03-25 DIAGNOSIS — Z53.21 PATIENT LEFT WITHOUT BEING SEEN: ICD-10-CM

## 2025-03-25 DIAGNOSIS — S41.112A LACERATION OF LEFT UPPER EXTREMITY, INITIAL ENCOUNTER: Primary | ICD-10-CM

## 2025-03-25 PROCEDURE — 99900041 HC LEFT WITHOUT BEING SEEN- EMERGENCY

## 2025-03-25 RX ORDER — BUPIVACAINE HYDROCHLORIDE 5 MG/ML
5 INJECTION, SOLUTION EPIDURAL; INTRACAUDAL; PERINEURAL
Status: DISCONTINUED | OUTPATIENT
Start: 2025-03-25 | End: 2025-03-25 | Stop reason: HOSPADM

## 2025-03-25 RX ORDER — LIDOCAINE HYDROCHLORIDE 10 MG/ML
10 INJECTION, SOLUTION EPIDURAL; INFILTRATION; INTRACAUDAL; PERINEURAL
Status: DISCONTINUED | OUTPATIENT
Start: 2025-03-25 | End: 2025-03-25 | Stop reason: HOSPADM

## (undated) DEVICE — MANIFOLD 4 PORT

## (undated) DEVICE — ELECTRODE REM PLYHSV RETURN 9

## (undated) DEVICE — SOL NORMAL USPCA 0.9%

## (undated) DEVICE — SYR ONLY LEUR TIP 30CC

## (undated) DEVICE — COVER LIGHT HANDLE 80/CA

## (undated) DEVICE — DRAPE U SPLIT SHEET 54X76IN

## (undated) DEVICE — PAD CAST SPECIALIST STRL 4

## (undated) DEVICE — SCRUB DYNA-HEX LIQ 4% CHG 4OZ

## (undated) DEVICE — BNDG COFLEX FOAM LF2 ST 6X5YD

## (undated) DEVICE — TUBING PUMP ARTHROSCOPY STRL

## (undated) DEVICE — SET CASSETTE TUBE DW OUTFLOW

## (undated) DEVICE — TOWEL OR DISP STRL BLUE 4/PK

## (undated) DEVICE — ELECTRODE LOOP 20MMX12MM

## (undated) DEVICE — SUT ETHILON 5-0 PS-2 18IN

## (undated) DEVICE — DRAPE ARTHSCP T ORTHOMAX POUCH

## (undated) DEVICE — TOURNIQUET SB QC DP 18X4IN

## (undated) DEVICE — KIT IRR SUCTION HND PIECE

## (undated) DEVICE — GOWN POLY REINF X-LONG XL

## (undated) DEVICE — SUT PROLENE 3-0 PS-2 BL 18IN

## (undated) DEVICE — PAD ABDOMINAL STERILE 8X10IN

## (undated) DEVICE — SOCKINETTE IMPERVIOUS 12X48IN

## (undated) DEVICE — COVER PROXIMA MAYO STAND

## (undated) DEVICE — DRESSING XEROFORM NONADH 1X8IN

## (undated) DEVICE — HEMOSTAT SURGICEL 2X3IN

## (undated) DEVICE — GOWN POLY REINF BRTH SLV XL

## (undated) DEVICE — PAD CAST SPECIALIST STRL 6

## (undated) DEVICE — TUBING MEDI-VAC 20FT .25IN

## (undated) DEVICE — GLOVE SURGEONS ULTRA TOUCH 6.5

## (undated) DEVICE — BANDAGE MATRIX HK LOOP 6IN 5YD

## (undated) DEVICE — SUT ETHILON 3-0 PS2 18 BLK

## (undated) DEVICE — DRAPE STERI U-SHAPED 47X51IN

## (undated) DEVICE — SOL NACL IRR 3000ML

## (undated) DEVICE — GAUZE CNFRM STRL 3INX4.1YD

## (undated) DEVICE — DRAPE ORTH SPLIT 77X108IN

## (undated) DEVICE — SUT 2-0 MONOCRYL PLUS SH UD

## (undated) DEVICE — HEADREST ROUND DISP FOAM 9IN

## (undated) DEVICE — SYR 30CC LUER LOCK

## (undated) DEVICE — SUT PDS II O CT-2 VIL MONO

## (undated) DEVICE — PACK BASIC SETUP SC BR

## (undated) DEVICE — UNDERGLOVES BIOGEL PI SIZE 7.5

## (undated) DEVICE — BNDG COFLEX FOAM LF2 ST 4X5YD

## (undated) DEVICE — SPONGE COTTON TRAY 4X4IN

## (undated) DEVICE — ALCOHOL 70% ANTISEPTIC ISO 4OZ

## (undated) DEVICE — APPLICATOR CHLORAPREP ORN 26ML

## (undated) DEVICE — BANDAGE ESMARK ELASTIC ST 6X9

## (undated) DEVICE — Device

## (undated) DEVICE — SHAVER TOMCAT 4.0